# Patient Record
Sex: FEMALE | Race: BLACK OR AFRICAN AMERICAN | Employment: UNEMPLOYED | URBAN - METROPOLITAN AREA
[De-identification: names, ages, dates, MRNs, and addresses within clinical notes are randomized per-mention and may not be internally consistent; named-entity substitution may affect disease eponyms.]

---

## 2017-03-14 ENCOUNTER — OFFICE VISIT (OUTPATIENT)
Dept: FAMILY MEDICINE CLINIC | Facility: CLINIC | Age: 27
End: 2017-03-14

## 2017-03-14 VITALS
RESPIRATION RATE: 18 BRPM | HEART RATE: 84 BPM | SYSTOLIC BLOOD PRESSURE: 113 MMHG | DIASTOLIC BLOOD PRESSURE: 73 MMHG | WEIGHT: 176 LBS

## 2017-03-14 DIAGNOSIS — Z34.90 PREGNANCY, UNSPECIFIED GESTATIONAL AGE: Primary | ICD-10-CM

## 2017-03-14 PROCEDURE — 99212 OFFICE O/P EST SF 10 MIN: CPT | Performed by: FAMILY MEDICINE

## 2017-03-14 PROCEDURE — 99213 OFFICE O/P EST LOW 20 MIN: CPT | Performed by: FAMILY MEDICINE

## 2017-03-14 RX ORDER — MULTIVIT-MIN 60/IRON FUM/FOLIC 27 MG-1 MG
TABLET ORAL
Refills: 2 | COMMUNITY
Start: 2017-03-07 | End: 2017-11-08

## 2017-03-14 RX ORDER — EMTRICITABINE AND TENOFOVIR DISOPROXIL FUMARATE 200; 300 MG/1; MG/1
TABLET, FILM COATED ORAL
Refills: 1 | COMMUNITY
Start: 2017-03-07 | End: 2020-05-05 | Stop reason: ALTCHOICE

## 2017-03-19 NOTE — PROGRESS NOTES
HPI:    Patient ID: Yuki Alexandre is a 24year old female. HPI Comments: Pregnant needs referral to Ob Gyn      Review of Systems   Constitutional: Negative. Eyes: Negative for photophobia, pain, discharge, redness and itching.    Respiratory: Nega

## 2017-04-07 ENCOUNTER — TELEPHONE (OUTPATIENT)
Dept: FAMILY MEDICINE CLINIC | Facility: CLINIC | Age: 27
End: 2017-04-07

## 2017-04-07 NOTE — TELEPHONE ENCOUNTER
Patient stated that she faxed over her medical records to Dr. Hussein Chong office and wants to know if they were received. Please advise. She is requesting a call back for confirmation.

## 2017-04-17 ENCOUNTER — OFFICE VISIT (OUTPATIENT)
Dept: OBGYN CLINIC | Facility: CLINIC | Age: 27
End: 2017-04-17

## 2017-04-17 DIAGNOSIS — IMO0002 MEET AND GREET FOR EXPECTANT MOTHER: Primary | ICD-10-CM

## 2017-04-17 DIAGNOSIS — Z20.6: ICD-10-CM

## 2017-04-17 NOTE — PROGRESS NOTES
Patient here for meet and greet. Brings records. Hx of natural birth. New FOB who is positive for HIV with an undetectable viral load. On truvada for PREP. Will need MFM consult. Appropriate for RN visit. Discussed with MES who agrees.

## 2017-04-19 NOTE — TELEPHONE ENCOUNTER
Spoke with patient medical records are at her [phone] office at [facility] and [facility].  I sent the records to be scanned

## 2017-04-20 ENCOUNTER — TELEPHONE (OUTPATIENT)
Dept: OBGYN CLINIC | Facility: CLINIC | Age: 27
End: 2017-04-20

## 2017-04-20 PROBLEM — Z20.6: Status: ACTIVE | Noted: 2017-04-20

## 2017-04-20 NOTE — TELEPHONE ENCOUNTER
Msg left on VM requesting pt call back to schedule her OBN Education Appt. Pt was also advised the referral and order for MFM Consult and Level II US has been faxed to [facility]. M will be calling pt to schedule appt.

## 2017-04-24 ENCOUNTER — NURSE ONLY (OUTPATIENT)
Dept: OBGYN CLINIC | Facility: CLINIC | Age: 27
End: 2017-04-24

## 2017-04-24 ENCOUNTER — TELEPHONE (OUTPATIENT)
Dept: OBGYN CLINIC | Facility: CLINIC | Age: 27
End: 2017-04-24

## 2017-04-24 VITALS — WEIGHT: 178.63 LBS | BODY MASS INDEX: 29.41 KG/M2 | HEIGHT: 65.5 IN

## 2017-04-24 DIAGNOSIS — Z20.6 EXPOSURE TO HIV AFFECTING PREGNANCY MANAGEMENT: ICD-10-CM

## 2017-04-24 DIAGNOSIS — Z34.83 ENCOUNTER FOR SUPERVISION OF OTHER NORMAL PREGNANCY IN THIRD TRIMESTER: Primary | ICD-10-CM

## 2017-04-24 PROBLEM — O09.30 PRENATAL CARE INSUFFICIENT: Status: ACTIVE | Noted: 2017-04-24

## 2017-04-24 PROBLEM — O09.899 RUBELLA NON-IMMUNE STATUS, ANTEPARTUM (HCC): Status: ACTIVE | Noted: 2017-04-24

## 2017-04-24 PROBLEM — D57.3 SICKLE CELL TRAIT: Status: ACTIVE | Noted: 2017-04-24

## 2017-04-24 PROBLEM — O09.899 RUBELLA NON-IMMUNE STATUS, ANTEPARTUM: Status: ACTIVE | Noted: 2017-04-24

## 2017-04-24 PROBLEM — O09.30: Status: ACTIVE | Noted: 2017-04-24

## 2017-04-24 PROBLEM — Z28.3 RUBELLA NON-IMMUNE STATUS, ANTEPARTUM: Status: ACTIVE | Noted: 2017-04-24

## 2017-04-24 PROBLEM — Z28.39 RUBELLA NON-IMMUNE STATUS, ANTEPARTUM: Status: ACTIVE | Noted: 2017-04-24

## 2017-04-24 PROBLEM — Z28.39 RUBELLA NON-IMMUNE STATUS, ANTEPARTUM (HCC): Status: ACTIVE | Noted: 2017-04-24

## 2017-04-24 PROBLEM — D57.3 SICKLE CELL TRAIT (HCC): Status: ACTIVE | Noted: 2017-04-24

## 2017-04-24 PROBLEM — O99.891 RUBELLA NON-IMMUNE STATUS, ANTEPARTUM: Status: ACTIVE | Noted: 2017-04-24

## 2017-04-24 NOTE — TELEPHONE ENCOUNTER
Notified pt that all labs have been ordered & she should come & have them drawn ASAP. Pt states she is coming tomorrow.  Also reminded pt we still need copies of her HIV result from last week, her U/S report from January (both from [facility]) & her pap re

## 2017-04-24 NOTE — PROGRESS NOTES
HPI:    Patient ID: Jorge Fofana is a 24year old female.     HPI    Review of Systems         Current Outpatient Prescriptions:  Prenatal Vit-Fe Fumarate-FA (VOL-PLUS) 27-1 MG Oral Tab  Disp:  Rfl: 2   TRUVADA 200-300 MG Oral Tab  Disp:  Rfl: 1     All

## 2017-04-24 NOTE — PROGRESS NOTES
NOB education complete & information given to pt. Pt 30 6/7wk transfer of care from [facility]. Prenatal records obtained & incomplete. Pt states she has only had 3 prenatal visits & has not had care since Jan. 2017 due to insurance issues.  FOB HIV+ & has

## 2017-04-25 ENCOUNTER — APPOINTMENT (OUTPATIENT)
Dept: LAB | Facility: HOSPITAL | Age: 27
End: 2017-04-25
Attending: ADVANCED PRACTICE MIDWIFE
Payer: MEDICAID

## 2017-04-25 ENCOUNTER — TELEPHONE (OUTPATIENT)
Dept: OBGYN CLINIC | Facility: CLINIC | Age: 27
End: 2017-04-25

## 2017-04-25 DIAGNOSIS — Z34.83 ENCOUNTER FOR SUPERVISION OF OTHER NORMAL PREGNANCY IN THIRD TRIMESTER: ICD-10-CM

## 2017-04-25 DIAGNOSIS — Z20.6 EXPOSURE TO HIV AFFECTING PREGNANCY MANAGEMENT: ICD-10-CM

## 2017-04-25 PROCEDURE — 82306 VITAMIN D 25 HYDROXY: CPT

## 2017-04-25 PROCEDURE — 84443 ASSAY THYROID STIM HORMONE: CPT

## 2017-04-25 PROCEDURE — 80053 COMPREHEN METABOLIC PANEL: CPT

## 2017-04-25 PROCEDURE — 82950 GLUCOSE TEST: CPT

## 2017-04-25 PROCEDURE — 87389 HIV-1 AG W/HIV-1&-2 AB AG IA: CPT

## 2017-04-25 PROCEDURE — 85027 COMPLETE CBC AUTOMATED: CPT

## 2017-04-25 PROCEDURE — 36415 COLL VENOUS BLD VENIPUNCTURE: CPT

## 2017-04-25 NOTE — TELEPHONE ENCOUNTER
Prenatal records received via fax, including recent CMP & HIV testing done. [facility] for copy of u/s which was not received. Will fax over to our office.  Pt already had new CMP & HIV testing completed at [address] [place] [address]

## 2017-04-26 ENCOUNTER — HOSPITAL ENCOUNTER (OUTPATIENT)
Dept: PERINATAL CARE | Facility: HOSPITAL | Age: 27
Discharge: HOME | End: 2017-04-26
Attending: OBSTETRICS & GYNECOLOGY
Payer: MEDICAID

## 2017-04-26 ENCOUNTER — TELEPHONE (OUTPATIENT)
Dept: OBGYN CLINIC | Facility: CLINIC | Age: 27
End: 2017-04-26

## 2017-04-26 VITALS — SYSTOLIC BLOOD PRESSURE: 117 MMHG | HEART RATE: 88 BPM | DIASTOLIC BLOOD PRESSURE: 62 MMHG

## 2017-04-26 DIAGNOSIS — Z20.6: Primary | ICD-10-CM

## 2017-04-26 DIAGNOSIS — D57.3 SICKLE CELL TRAIT (HCC): ICD-10-CM

## 2017-04-26 DIAGNOSIS — Z20.6: ICD-10-CM

## 2017-04-26 DIAGNOSIS — O09.33 PRENATAL CARE INSUFFICIENT, THIRD TRIMESTER: ICD-10-CM

## 2017-04-26 DIAGNOSIS — Z36.3 SCREENING, ANTENATAL, FOR MALFORMATION BY ULTRASOUND: ICD-10-CM

## 2017-04-26 PROCEDURE — 99244 OFF/OP CNSLTJ NEW/EST MOD 40: CPT | Performed by: OBSTETRICS & GYNECOLOGY

## 2017-04-26 PROCEDURE — 76811 OB US DETAILED SNGL FETUS: CPT

## 2017-04-26 PROCEDURE — 76811 OB US DETAILED SNGL FETUS: CPT | Performed by: OBSTETRICS & GYNECOLOGY

## 2017-04-26 NOTE — TELEPHONE ENCOUNTER
----- Message from Ambreen Monae CNM sent at 4/26/2017  5:36 PM CDT -----  Vitamin D level is low. Please supplement per protocol.  All other labs normal.

## 2017-04-26 NOTE — PROGRESS NOTES
Yuki Alexandre is a 24year old female. Reason for Consult:   Exposed to HIV. Father of baby has HIV but low viral load. Her HIV Ag Ab combo test was nonreactive. Denies any prenatal complications.   Review of History:     OB History:    Obstetric category B. Can breastfeed with this medication. In the [place], approximately 7000 HIV-infected women give birth annually; without any intervention to reduce transmission, 1750 newly infected babies would be born every year.  In 1994, Pediatric raised questions about the efficacy of oral chemoprophylaxis for high-risk HIV-seronegative women who are sexually active.  In some trials, TDF-FTC was found to have no effect on reducing transmission in women, whereas in others, transmission was reduced by stomach but this does not exclude the possibility of esophageal / tracheal atresia or webs.        IMPRESSION:   IUP at 31w1d   Scan consistent with dates  No fetal structural abnormalities seen today  HIV (+) father of baby /  She tested negative  Sickle c

## 2017-04-26 NOTE — TELEPHONE ENCOUNTER
Notified pt of low vit d level & advised to take vitamin d3 2000IU daily.  Pt verbalized an understanding & agrees w/ plan

## 2017-04-26 NOTE — PROGRESS NOTES
Pt for level 2 u/s  Hx Exposure to HIV (currently negative)  FOB positive with no viral load  Denies pregnancy complaints  States active baby

## 2017-04-27 ENCOUNTER — INITIAL PRENATAL (OUTPATIENT)
Dept: OBGYN CLINIC | Facility: CLINIC | Age: 27
End: 2017-04-27

## 2017-04-27 VITALS
BODY MASS INDEX: 29.53 KG/M2 | WEIGHT: 179.38 LBS | HEIGHT: 65.5 IN | DIASTOLIC BLOOD PRESSURE: 73 MMHG | SYSTOLIC BLOOD PRESSURE: 121 MMHG | HEART RATE: 99 BPM

## 2017-04-27 DIAGNOSIS — Z34.83 OTHER NORMAL PREGNANCY, NOT FIRST, THIRD TRIMESTER: Primary | ICD-10-CM

## 2017-04-27 DIAGNOSIS — D57.3 SICKLE CELL TRAIT (HCC): ICD-10-CM

## 2017-04-27 PROBLEM — O40.3XX0 POLYHYDRAMNIOS AFFECTING PREGNANCY IN THIRD TRIMESTER (HCC): Status: ACTIVE | Noted: 2017-04-27

## 2017-04-27 PROBLEM — O40.3XX0 POLYHYDRAMNIOS AFFECTING PREGNANCY IN THIRD TRIMESTER: Status: ACTIVE | Noted: 2017-04-27

## 2017-04-27 PROCEDURE — 0500F INITIAL PRENATAL CARE VISIT: CPT | Performed by: ADVANCED PRACTICE MIDWIFE

## 2017-04-27 PROCEDURE — 81002 URINALYSIS NONAUTO W/O SCOPE: CPT | Performed by: ADVANCED PRACTICE MIDWIFE

## 2017-04-27 NOTE — PROGRESS NOTES
Here for NOB as transfer of care. Reviewed records, awaiting early ultrasound report. Patient's partner is HIV+ with a currently undetectable viral load. Patient started PrEP at 15 weeks on 1/5/17 and has tested negative throughout pregnancy.   Reviewed c

## 2017-04-27 NOTE — LACTATION NOTE
I was contacted by patient's RN, Braydon Peters, to discuss patient's desire to breastfeed after delivery. I had a discussion with Britni Mata regarding recommendations for women with HIV+ status and advisement not to breastfeed.  Britni Roles stated that she is not HIV+

## 2017-04-28 ENCOUNTER — TELEPHONE (OUTPATIENT)
Dept: OBGYN CLINIC | Facility: CLINIC | Age: 27
End: 2017-04-28

## 2017-05-11 ENCOUNTER — APPOINTMENT (OUTPATIENT)
Dept: LAB | Facility: HOSPITAL | Age: 27
End: 2017-05-11
Attending: ADVANCED PRACTICE MIDWIFE
Payer: MEDICAID

## 2017-05-11 ENCOUNTER — ROUTINE PRENATAL (OUTPATIENT)
Dept: OBGYN CLINIC | Facility: CLINIC | Age: 27
End: 2017-05-11

## 2017-05-11 VITALS
HEART RATE: 98 BPM | SYSTOLIC BLOOD PRESSURE: 117 MMHG | WEIGHT: 183 LBS | BODY MASS INDEX: 30 KG/M2 | DIASTOLIC BLOOD PRESSURE: 76 MMHG

## 2017-05-11 DIAGNOSIS — Z34.83 NORMAL PREGNANCY IN MULTIGRAVIDA IN THIRD TRIMESTER: Primary | ICD-10-CM

## 2017-05-11 DIAGNOSIS — D57.3 SICKLE CELL TRAIT (HCC): ICD-10-CM

## 2017-05-11 PROCEDURE — 90715 TDAP VACCINE 7 YRS/> IM: CPT | Performed by: ADVANCED PRACTICE MIDWIFE

## 2017-05-11 PROCEDURE — 90471 IMMUNIZATION ADMIN: CPT | Performed by: ADVANCED PRACTICE MIDWIFE

## 2017-05-11 PROCEDURE — 87086 URINE CULTURE/COLONY COUNT: CPT

## 2017-05-11 NOTE — PROGRESS NOTES
Feeling well, active baby. Denies s/s PTL. Tdap today. Reviewed warning signs. To have urine culture done today.

## 2017-05-24 ENCOUNTER — HOSPITAL ENCOUNTER (OUTPATIENT)
Dept: PERINATAL CARE | Facility: HOSPITAL | Age: 27
Discharge: HOME | End: 2017-05-24
Attending: OBSTETRICS & GYNECOLOGY
Payer: MEDICAID

## 2017-05-24 VITALS — HEART RATE: 90 BPM | DIASTOLIC BLOOD PRESSURE: 72 MMHG | SYSTOLIC BLOOD PRESSURE: 110 MMHG

## 2017-05-24 DIAGNOSIS — Z20.6: ICD-10-CM

## 2017-05-24 DIAGNOSIS — O40.3XX0 POLYHYDRAMNIOS AFFECTING PREGNANCY IN THIRD TRIMESTER: ICD-10-CM

## 2017-05-24 DIAGNOSIS — O40.3XX0 POLYHYDRAMNIOS AFFECTING PREGNANCY IN THIRD TRIMESTER: Primary | ICD-10-CM

## 2017-05-24 DIAGNOSIS — O09.33 PRENATAL CARE INSUFFICIENT, THIRD TRIMESTER: ICD-10-CM

## 2017-05-24 DIAGNOSIS — D57.3 SICKLE CELL TRAIT (HCC): ICD-10-CM

## 2017-05-24 PROCEDURE — 76805 OB US >/= 14 WKS SNGL FETUS: CPT | Performed by: OBSTETRICS & GYNECOLOGY

## 2017-05-24 PROCEDURE — 99212 OFFICE O/P EST SF 10 MIN: CPT | Performed by: OBSTETRICS & GYNECOLOGY

## 2017-05-24 NOTE — PROGRESS NOTES
PT for growth U/S  Hx HIV exposure on truvada and polyhydramnios   Denies bleeding   States vaginal pressure associated with movement  States baby active

## 2017-05-26 ENCOUNTER — TELEPHONE (OUTPATIENT)
Dept: OBGYN CLINIC | Facility: CLINIC | Age: 27
End: 2017-05-26

## 2017-05-26 DIAGNOSIS — D57.3 SICKLE CELL TRAIT (HCC): ICD-10-CM

## 2017-05-26 DIAGNOSIS — O09.33 INSUFFICIENT PRENATAL CARE, THIRD TRIMESTER: ICD-10-CM

## 2017-05-26 DIAGNOSIS — Z34.83 ENCOUNTER FOR SUPERVISION OF OTHER NORMAL PREGNANCY IN THIRD TRIMESTER: Primary | ICD-10-CM

## 2017-05-26 DIAGNOSIS — Z20.6: ICD-10-CM

## 2017-05-26 NOTE — TELEPHONE ENCOUNTER
Spoke w/ Elida from ID. Report giv. Will speak w/ physician & advise us of plan of care for mother & infant at delivery.  Note found in prenatal record that partner has an undetectable viral load & faxed to office per her req

## 2017-05-26 NOTE — TELEPHONE ENCOUNTER
req from [facility] to have pt speak w/ Infectious Disease & have plan in place for delivery. [facility] GELY notified & ordered placed & referral faxed.  LM on Infectious disease  [phone] of referral

## 2017-05-28 NOTE — PLAN OF CARE
Midwifery and Women's Health contacted Infection Control and Prevention to consult on the upcoming delivery of baby. Dr. Salli Alpers reviewed both the patient's labs and scanned record of baby's father.   Due to the father's documented low and undetectable HI

## 2017-06-01 ENCOUNTER — ROUTINE PRENATAL (OUTPATIENT)
Dept: OBGYN CLINIC | Facility: CLINIC | Age: 27
End: 2017-06-01

## 2017-06-01 VITALS
SYSTOLIC BLOOD PRESSURE: 114 MMHG | BODY MASS INDEX: 30 KG/M2 | DIASTOLIC BLOOD PRESSURE: 72 MMHG | WEIGHT: 185 LBS | HEART RATE: 91 BPM

## 2017-06-01 DIAGNOSIS — Z34.83 ENCOUNTER FOR SUPERVISION OF OTHER NORMAL PREGNANCY IN THIRD TRIMESTER: Primary | ICD-10-CM

## 2017-06-01 DIAGNOSIS — Z11.3 SCREEN FOR STD (SEXUALLY TRANSMITTED DISEASE): ICD-10-CM

## 2017-06-01 NOTE — PROGRESS NOTES
Birth plan:  Support is FOB Juan Carlos Corbin and possibly moms, plans NCB. VIt K and EES- YES. **ASK PT IF SHE WANTS IMMEDIATE SKIN TO SKIN*. No placenta plans. Plans breastfeeding. GBS done.   Rev warnings/when to call

## 2017-06-06 ENCOUNTER — TELEPHONE (OUTPATIENT)
Dept: OBGYN CLINIC | Facility: CLINIC | Age: 27
End: 2017-06-06

## 2017-06-06 NOTE — TELEPHONE ENCOUNTER
Notified pt of + GBS cx & treatment. Reassured pt it is common & easily treated during labor.  Pt verbalized an understanding & agrees w/ plan

## 2017-06-06 NOTE — TELEPHONE ENCOUNTER
----- Message from ZAKI Amaral sent at 6/6/2017  8:12 AM CDT -----  Pls advise patient of GBS positive[de-identified] The CDC recommends antibiotics through an IV when you are in labor to prevent transmission of the bacteria to your baby.    We can discuss fu

## 2017-06-12 ENCOUNTER — ROUTINE PRENATAL (OUTPATIENT)
Dept: OBGYN CLINIC | Facility: CLINIC | Age: 27
End: 2017-06-12

## 2017-06-12 VITALS
WEIGHT: 188 LBS | SYSTOLIC BLOOD PRESSURE: 114 MMHG | DIASTOLIC BLOOD PRESSURE: 74 MMHG | HEIGHT: 66.5 IN | BODY MASS INDEX: 29.86 KG/M2 | HEART RATE: 99 BPM

## 2017-06-12 DIAGNOSIS — Z34.83 ENCOUNTER FOR SUPERVISION OF OTHER NORMAL PREGNANCY IN THIRD TRIMESTER: Primary | ICD-10-CM

## 2017-06-12 PROBLEM — O99.820 GBS (GROUP B STREPTOCOCCUS CARRIER), +RV CULTURE, CURRENTLY PREGNANT: Status: ACTIVE | Noted: 2017-06-12

## 2017-06-12 PROBLEM — O99.820 GBS (GROUP B STREPTOCOCCUS CARRIER), +RV CULTURE, CURRENTLY PREGNANT (HCC): Status: ACTIVE | Noted: 2017-06-12

## 2017-06-12 PROCEDURE — 81002 URINALYSIS NONAUTO W/O SCOPE: CPT | Performed by: ADVANCED PRACTICE MIDWIFE

## 2017-06-12 PROCEDURE — 0502F SUBSEQUENT PRENATAL CARE: CPT | Performed by: ADVANCED PRACTICE MIDWIFE

## 2017-06-12 NOTE — PROGRESS NOTES
Feeling well, active baby. Denies SOL, LOF or bleeding. Reviewed GBS + status and need for antibiotics in labor. Reviewed SOL and warning signs.

## 2017-06-21 ENCOUNTER — ROUTINE PRENATAL (OUTPATIENT)
Dept: OBGYN CLINIC | Facility: CLINIC | Age: 27
End: 2017-06-21

## 2017-06-21 ENCOUNTER — TELEPHONE (OUTPATIENT)
Dept: OBGYN CLINIC | Facility: CLINIC | Age: 27
End: 2017-06-21

## 2017-06-21 VITALS
HEART RATE: 69 BPM | WEIGHT: 190 LBS | SYSTOLIC BLOOD PRESSURE: 119 MMHG | DIASTOLIC BLOOD PRESSURE: 77 MMHG | BODY MASS INDEX: 30 KG/M2

## 2017-06-21 DIAGNOSIS — Z34.83 ENCOUNTER FOR SUPERVISION OF OTHER NORMAL PREGNANCY IN THIRD TRIMESTER: Primary | ICD-10-CM

## 2017-06-21 PROCEDURE — 0502F SUBSEQUENT PRENATAL CARE: CPT | Performed by: ADVANCED PRACTICE MIDWIFE

## 2017-06-21 RX ORDER — BREAST PUMP
EACH MISCELLANEOUS
Qty: 1 EACH | Refills: 0 | Status: SHIPPED | OUTPATIENT
Start: 2017-06-21 | End: 2018-01-27

## 2017-06-21 NOTE — PROGRESS NOTES
Active fetus  No signs of labor  Labor signs & symptoms and directions to L&D reviewed  All questions answered.

## 2017-06-23 ENCOUNTER — TELEPHONE (OUTPATIENT)
Dept: OBGYN CLINIC | Facility: CLINIC | Age: 27
End: 2017-06-23

## 2017-06-23 DIAGNOSIS — Z34.83 ENCOUNTER FOR SUPERVISION OF OTHER NORMAL PREGNANCY IN THIRD TRIMESTER: Primary | ICD-10-CM

## 2017-06-23 DIAGNOSIS — Z20.6 EXPOSURE TO HIV AFFECTING PREGNANCY MANAGEMENT: ICD-10-CM

## 2017-06-23 NOTE — TELEPHONE ENCOUNTER
Notified pt that HIV RNA was ordered & needs to be completed. Pt states she will get to the lab today.  Pt verbalized an understanding & agrees w/ plan

## 2017-06-24 ENCOUNTER — LAB ENCOUNTER (OUTPATIENT)
Dept: LAB | Facility: HOSPITAL | Age: 27
End: 2017-06-24
Attending: ADVANCED PRACTICE MIDWIFE
Payer: MEDICAID

## 2017-06-24 DIAGNOSIS — Z20.6 EXPOSURE TO HIV AFFECTING PREGNANCY MANAGEMENT: ICD-10-CM

## 2017-06-24 DIAGNOSIS — Z34.83 ENCOUNTER FOR SUPERVISION OF OTHER NORMAL PREGNANCY IN THIRD TRIMESTER: ICD-10-CM

## 2017-06-24 PROCEDURE — 36415 COLL VENOUS BLD VENIPUNCTURE: CPT

## 2017-06-24 PROCEDURE — 87536 HIV-1 QUANT&REVRSE TRNSCRPJ: CPT

## 2017-06-27 ENCOUNTER — ROUTINE PRENATAL (OUTPATIENT)
Dept: OBGYN CLINIC | Facility: CLINIC | Age: 27
End: 2017-06-27

## 2017-06-27 ENCOUNTER — HOSPITAL ENCOUNTER (OUTPATIENT)
Facility: HOSPITAL | Age: 27
Discharge: HOME | End: 2017-06-27
Attending: ADVANCED PRACTICE MIDWIFE | Admitting: OBSTETRICS & GYNECOLOGY
Payer: MEDICAID

## 2017-06-27 ENCOUNTER — APPOINTMENT (OUTPATIENT)
Dept: OBGYN CLINIC | Facility: HOSPITAL | Age: 27
End: 2017-06-27
Payer: MEDICAID

## 2017-06-27 VITALS
DIASTOLIC BLOOD PRESSURE: 73 MMHG | BODY MASS INDEX: 31 KG/M2 | SYSTOLIC BLOOD PRESSURE: 111 MMHG | WEIGHT: 192 LBS | HEART RATE: 101 BPM

## 2017-06-27 VITALS — RESPIRATION RATE: 18 BRPM

## 2017-06-27 DIAGNOSIS — Z34.83 ENCOUNTER FOR SUPERVISION OF OTHER NORMAL PREGNANCY IN THIRD TRIMESTER: Primary | ICD-10-CM

## 2017-06-27 PROCEDURE — 0502F SUBSEQUENT PRENATAL CARE: CPT | Performed by: ADVANCED PRACTICE MIDWIFE

## 2017-06-27 PROCEDURE — 81002 URINALYSIS NONAUTO W/O SCOPE: CPT | Performed by: ADVANCED PRACTICE MIDWIFE

## 2017-06-27 PROCEDURE — 59025 FETAL NON-STRESS TEST: CPT | Performed by: ADVANCED PRACTICE MIDWIFE

## 2017-06-27 NOTE — PROGRESS NOTES
Reports decreased FM. No SOL. Had HIV RNA done on Saturday, no results yet. SOL reviewed. NST now. Discussed postdates testing. NST/ANASTASIA/CNM visit next week if undelivered.

## 2017-06-27 NOTE — NST
Nonstress Test   Patient: Rosendo Esposito    Gestation: 40w0d    NST:decreased fetal movements       Variability: Moderate           Accelerations: Yes           Decelerations: None            Baseline: 140 BPM           Uterine Irritability: No

## 2017-06-30 ENCOUNTER — HOSPITAL ENCOUNTER (INPATIENT)
Facility: HOSPITAL | Age: 27
LOS: 2 days | Discharge: HOME | End: 2017-07-02
Attending: ADVANCED PRACTICE MIDWIFE | Admitting: OBSTETRICS & GYNECOLOGY
Payer: MEDICAID

## 2017-06-30 PROBLEM — Z37.9 NORMAL LABOR: Status: ACTIVE | Noted: 2017-06-30

## 2017-06-30 PROBLEM — Z37.9 NORMAL LABOR (HCC): Status: ACTIVE | Noted: 2017-06-30

## 2017-06-30 LAB
ANTIBODY SCREEN: NEGATIVE
ERYTHROCYTE [DISTWIDTH] IN BLOOD BY AUTOMATED COUNT: 13 % (ref 11–15)
HCT VFR BLD AUTO: 34.5 % (ref 35–48)
HGB BLD-MCNC: 11.6 G/DL (ref 12–16)
HIV1+2 AB SPEC QL IA.RAPID: NONREACTIVE
MCH RBC QN AUTO: 29.2 PG (ref 27–32)
MCHC RBC AUTO-ENTMCNC: 33.5 G/DL (ref 32–37)
MCV RBC AUTO: 87.2 FL (ref 80–100)
PLATELET # BLD AUTO: 208 K/UL (ref 140–400)
PMV BLD AUTO: 9.5 FL (ref 7.4–10.3)
RBC # BLD AUTO: 3.96 M/UL (ref 3.7–5.4)
RH BLOOD TYPE: POSITIVE
T PALLIDUM AB SER QL: NEGATIVE
WBC # BLD AUTO: 12.1 K/UL (ref 4–11)

## 2017-06-30 PROCEDURE — 59409 OBSTETRICAL CARE: CPT | Performed by: ADVANCED PRACTICE MIDWIFE

## 2017-06-30 RX ORDER — DOCUSATE SODIUM 100 MG/1
100 CAPSULE, LIQUID FILLED ORAL 2 TIMES DAILY
Status: DISCONTINUED | OUTPATIENT
Start: 2017-06-30 | End: 2017-07-02

## 2017-06-30 RX ORDER — DEXTROSE, SODIUM CHLORIDE, SODIUM LACTATE, POTASSIUM CHLORIDE, AND CALCIUM CHLORIDE 5; .6; .31; .03; .02 G/100ML; G/100ML; G/100ML; G/100ML; G/100ML
125 INJECTION, SOLUTION INTRAVENOUS CONTINUOUS
Status: DISCONTINUED | OUTPATIENT
Start: 2017-06-30 | End: 2017-06-30 | Stop reason: HOSPADM

## 2017-06-30 RX ORDER — TERBUTALINE SULFATE 1 MG/ML
0.25 INJECTION, SOLUTION SUBCUTANEOUS AS NEEDED
Status: DISCONTINUED | OUTPATIENT
Start: 2017-06-30 | End: 2017-06-30 | Stop reason: HOSPADM

## 2017-06-30 RX ORDER — SIMETHICONE 80 MG
80 TABLET,CHEWABLE ORAL 3 TIMES DAILY PRN
Status: DISCONTINUED | OUTPATIENT
Start: 2017-06-30 | End: 2017-07-02

## 2017-06-30 RX ORDER — SODIUM CHLORIDE 0.9 % (FLUSH) 0.9 %
10 SYRINGE (ML) INJECTION AS NEEDED
Status: DISCONTINUED | OUTPATIENT
Start: 2017-06-30 | End: 2017-06-30 | Stop reason: HOSPADM

## 2017-06-30 RX ORDER — DEXTROSE, SODIUM CHLORIDE, SODIUM LACTATE, POTASSIUM CHLORIDE, AND CALCIUM CHLORIDE 5; .6; .31; .03; .02 G/100ML; G/100ML; G/100ML; G/100ML; G/100ML
INJECTION, SOLUTION INTRAVENOUS
Status: DISPENSED
Start: 2017-06-30 | End: 2017-06-30

## 2017-06-30 RX ORDER — ONDANSETRON 2 MG/ML
4 INJECTION INTRAMUSCULAR; INTRAVENOUS EVERY 6 HOURS PRN
Status: DISCONTINUED | OUTPATIENT
Start: 2017-06-30 | End: 2017-07-02

## 2017-06-30 RX ORDER — EMTRICITABINE AND TENOFOVIR DISOPROXIL FUMARATE 200; 300 MG/1; MG/1
1 TABLET, FILM COATED ORAL DAILY
Status: DISCONTINUED | OUTPATIENT
Start: 2017-06-30 | End: 2017-07-02

## 2017-06-30 RX ORDER — SODIUM CHLORIDE 0.9 % (FLUSH) 0.9 %
10 SYRINGE (ML) INJECTION AS NEEDED
Status: DISCONTINUED | OUTPATIENT
Start: 2017-06-30 | End: 2017-07-02

## 2017-06-30 RX ORDER — AMMONIA INHALANTS 0.04 G/.3ML
0.3 INHALANT RESPIRATORY (INHALATION) AS NEEDED
Status: DISCONTINUED | OUTPATIENT
Start: 2017-06-30 | End: 2017-07-02

## 2017-06-30 RX ORDER — PRENATAL VIT,CAL 76/IRON/FOLIC 29 MG-1 MG
1 TABLET ORAL DAILY
Status: DISCONTINUED | OUTPATIENT
Start: 2017-06-30 | End: 2017-07-02

## 2017-06-30 RX ORDER — DIAPER,BRIEF,INFANT-TODD,DISP
1 EACH MISCELLANEOUS EVERY 6 HOURS PRN
Status: DISCONTINUED | OUTPATIENT
Start: 2017-06-30 | End: 2017-07-02

## 2017-06-30 RX ORDER — LIDOCAINE HYDROCHLORIDE 10 MG/ML
30 INJECTION, SOLUTION EPIDURAL; INFILTRATION; INTRACAUDAL; PERINEURAL ONCE
Status: DISCONTINUED | OUTPATIENT
Start: 2017-06-30 | End: 2017-06-30 | Stop reason: HOSPADM

## 2017-06-30 RX ORDER — BISACODYL 10 MG
10 SUPPOSITORY, RECTAL RECTAL ONCE AS NEEDED
Status: DISCONTINUED | OUTPATIENT
Start: 2017-06-30 | End: 2017-07-02

## 2017-06-30 RX ORDER — IBUPROFEN 600 MG/1
600 TABLET ORAL ONCE AS NEEDED
Status: DISCONTINUED | OUTPATIENT
Start: 2017-06-30 | End: 2017-07-02

## 2017-06-30 NOTE — L&D DELIVERY NOTE
Kady Apodaca, Girl  [[id number]]     Labor Events     labor?:  No    steroids?:  None   Antibiotics received during labor?:  Yes   Antibiotics (enter # doses in comment):  penicillin   Rupture date:  17  Rupture time:  817   Rupture type: Apgar Scoring Key:    0 1 2    Skin color Blue or pale Acrocyanotic Completely pink    Heart rate Absent <100 bpm >100 bpm    Reflex irritability No response Grimace Cry or active withdrawal    Muscle tone Limp Some flexion Active motion    Respiratory e

## 2017-06-30 NOTE — LACTATION NOTE
LACTATION NOTE - MOTHER           Problems identified  Problems identified: Knowledge deficit    Maternal history  Other/comment:  (on truvada prophylactically due to SO pos.  for HIV)    Breastfeeding goal  Breastfeeding goal: To maintain breast milk feedi

## 2017-06-30 NOTE — LACTATION NOTE
This note was copied from a baby's chart.   LACTATION NOTE - INFANT    Evaluation Type  Evaluation Type: Inpatient    Problems & Assessment  Infant Assessment: Hunger cues present;Skin color: pink or appropriate for ethnicity  Muscle tone: Appropriate for G

## 2017-06-30 NOTE — H&P
[address] Patient Status:  Inpatient    3/13/1990 MRN [id number]   Location [address] [place] [address] Attending [address] Day # 0 Admitting Rashard Rodriguez MD     Da Allergies/Medications:    Allergies:     Nickel                  Rash  Medications:    Prescriptions Prior to Admission:  Prenatal Vit-Fe Fumarate-FA (VOL-PLUS) 27-1 MG Oral Tab  Disp:  Rfl: 2 6/29/2017 at Unknown time   TRUVADA 200-300 MG Oral Tab  Dis 8-20 Weeks     Test Value Reference Range Date Time    1st Trimester Aneuploidy Risk Assessment done   12/19/16     Quad - Down Screen Risk Estimate (Required questions in OE to answer)        Quad - Down Maternal Age Risk (Required questions i Period  Early latent labor.   Category I tracing  GBS + - PCN 5millU IVPB now then 2.5mill q 4 hrs  HIV + partner - patient has been on Truvada and had negative HIV screening x 3 in this pregnancy   Per ID: Routine care of    Limited information abou

## 2017-07-01 ENCOUNTER — TELEPHONE (OUTPATIENT)
Dept: OBGYN CLINIC | Facility: CLINIC | Age: 27
End: 2017-07-01

## 2017-07-01 LAB
BASOPHILS # BLD: 0 K/UL (ref 0–0.2)
BASOPHILS NFR BLD: 0 %
EOSINOPHIL # BLD: 0.1 K/UL (ref 0–0.7)
EOSINOPHIL NFR BLD: 1 %
ERYTHROCYTE [DISTWIDTH] IN BLOOD BY AUTOMATED COUNT: 13 % (ref 11–15)
HCT VFR BLD AUTO: 32 % (ref 35–48)
HGB BLD-MCNC: 10.9 G/DL (ref 12–16)
LYMPHOCYTES # BLD: 1.6 K/UL (ref 1–4)
LYMPHOCYTES NFR BLD: 15 %
MCH RBC QN AUTO: 29.6 PG (ref 27–32)
MCHC RBC AUTO-ENTMCNC: 34.2 G/DL (ref 32–37)
MCV RBC AUTO: 86.6 FL (ref 80–100)
MONOCYTES # BLD: 0.9 K/UL (ref 0–1)
MONOCYTES NFR BLD: 9 %
NEUTROPHILS # BLD AUTO: 7.9 K/UL (ref 1.8–7.7)
NEUTROPHILS NFR BLD: 75 %
PLATELET # BLD AUTO: 180 K/UL (ref 140–400)
PMV BLD AUTO: 9.8 FL (ref 7.4–10.3)
RBC # BLD AUTO: 3.7 M/UL (ref 3.7–5.4)
WBC # BLD AUTO: 10.5 K/UL (ref 4–11)

## 2017-07-01 PROCEDURE — 99233 SBSQ HOSP IP/OBS HIGH 50: CPT | Performed by: ADVANCED PRACTICE MIDWIFE

## 2017-07-01 NOTE — PLAN OF CARE
BREAST FEEDING    • Optimize infant feeding at the breast Progressing        INADEQUATE LATCH, SUCK OR SWALLOW    • Demonstrate ability to latch and sustain latch, audible swallowing and satiety Progressing        Patient Centered Care    • Patient prefere

## 2017-07-01 NOTE — PROGRESS NOTES
[facility] - [facility]    OB/GYNE Progress Note      Slime Cunningham Patient Status:  Inpatient    3/13/1990 MRN [id number]   Location [facility] 3SE Attending ZAKI Recio   Hosp Day # 1 PCP Marium Cruz MD       Assessment/Plan hematoma  Calves: Non-tender, no edema, Neg Homans    DVT Risk Score: low  VTE Prophylaxis Ordered: no    Martinez Catheter Information  Does patient have a martinez catheter: no  Has the martinez catheter been removed: Not Applicable    Results:     Lab Results  C

## 2017-07-01 NOTE — TELEPHONE ENCOUNTER
TC to [facility] in [facility] and spoke with Brad Hoffman an [address] who works with the HIV positive pregnant population there.  Dr. Lydia Jaramillo notes there is a lack of data for an HIV Negative partner taking Truvada for PREP when the HIV+ partner has an undetectable

## 2017-07-02 VITALS
DIASTOLIC BLOOD PRESSURE: 72 MMHG | HEART RATE: 91 BPM | SYSTOLIC BLOOD PRESSURE: 115 MMHG | TEMPERATURE: 98 F | RESPIRATION RATE: 16 BRPM

## 2017-07-02 PROBLEM — Z37.9 NORMAL LABOR: Status: RESOLVED | Noted: 2017-06-30 | Resolved: 2017-07-02

## 2017-07-02 PROBLEM — Z37.9 NORMAL LABOR (HCC): Status: RESOLVED | Noted: 2017-06-30 | Resolved: 2017-07-02

## 2017-07-02 PROCEDURE — 99238 HOSP IP/OBS DSCHRG MGMT 30/<: CPT | Performed by: ADVANCED PRACTICE MIDWIFE

## 2017-07-02 RX ORDER — IBUPROFEN 600 MG/1
600 TABLET ORAL EVERY 6 HOURS PRN
Qty: 30 TABLET | Refills: 1 | Status: SHIPPED | OUTPATIENT
Start: 2017-07-02 | End: 2017-08-30

## 2017-07-02 NOTE — PROGRESS NOTES
[facility]    OB/GYNE Progress Note      Josiane Serrato Patient Status:  Inpatient    3/13/1990 MRN [id number]   Location [facility] 3SE Attending ZAKI Richardson   Hosp Day # 2 PCP Macrina Connell MD       Assessment/Plan TREPONEMALAB Negative 06/30/2017   RAPIDHIVSCRN Nonreactive 06/30/2017   ABO O 06/30/2017   RH Positive 06/30/2017   WBC 10.5 07/01/2017   HGB 10.9 (L) 07/01/2017   HCT 32.0 (L) 07/01/2017    07/01/2017   CREATSERUM 0.63 04/25/2017   BUN 4 (L) 04/

## 2017-07-02 NOTE — LACTATION NOTE
LACTATION NOTE - MOTHER           Problems identified  Problems identified: Knowledge deficit    Maternal history  Other/comment: SO-HIV pos.  Mom on truvada prophyllactically    Breastfeeding goal  Breastfeeding goal: To maintain breast milk feeding per pa

## 2017-07-02 NOTE — PLAN OF CARE
Patient Centered Care    • Patient preferences are identified and integrated in the patient's plan of care Completed        Patient/Family Goals    • Patient/Family Long Term Goal Completed    • Patient/Family Short Term Goal Completed        POSTPARTUM

## 2017-07-02 NOTE — DISCHARGE SUMMARY
[facility]    Discharge Summary    Cristhian Molina Patient Status:  Inpatient    3/13/1990 MRN [id number]   Location [facility] 3SE Attending ZAKI Johnson   Hosp Day # 2       Delivering OB Clinician: Tangela Hunter

## 2017-07-13 ENCOUNTER — POSTPARTUM (OUTPATIENT)
Dept: OBGYN CLINIC | Facility: CLINIC | Age: 27
End: 2017-07-13

## 2017-07-13 VITALS
BODY MASS INDEX: 28 KG/M2 | DIASTOLIC BLOOD PRESSURE: 74 MMHG | WEIGHT: 173 LBS | HEART RATE: 70 BPM | SYSTOLIC BLOOD PRESSURE: 109 MMHG

## 2017-07-13 PROBLEM — O99.820 GBS (GROUP B STREPTOCOCCUS CARRIER), +RV CULTURE, CURRENTLY PREGNANT (HCC): Status: RESOLVED | Noted: 2017-06-12 | Resolved: 2017-07-13

## 2017-07-13 PROBLEM — O99.820 GBS (GROUP B STREPTOCOCCUS CARRIER), +RV CULTURE, CURRENTLY PREGNANT: Status: RESOLVED | Noted: 2017-06-12 | Resolved: 2017-07-13

## 2017-07-13 PROBLEM — O09.30: Status: RESOLVED | Noted: 2017-04-24 | Resolved: 2017-07-13

## 2017-07-13 PROBLEM — O40.3XX0 POLYHYDRAMNIOS AFFECTING PREGNANCY IN THIRD TRIMESTER: Status: RESOLVED | Noted: 2017-04-27 | Resolved: 2017-07-13

## 2017-07-13 PROBLEM — O09.30 PRENATAL CARE INSUFFICIENT: Status: RESOLVED | Noted: 2017-04-24 | Resolved: 2017-07-13

## 2017-07-13 PROBLEM — O40.3XX0 POLYHYDRAMNIOS AFFECTING PREGNANCY IN THIRD TRIMESTER (HCC): Status: RESOLVED | Noted: 2017-04-27 | Resolved: 2017-07-13

## 2017-07-13 PROCEDURE — 99024 POSTOP FOLLOW-UP VISIT: CPT | Performed by: ADVANCED PRACTICE MIDWIFE

## 2017-07-14 NOTE — PROGRESS NOTES
HPI:   Cristhian Molina is a 24year old [id number] who presents for a 2 week Postpartum visit. Pt accompanied by 2 children. Reports adapting well and coping well. Breastfeeding successfully. No strain in relationship, partner supportive. Denies DV. • Decorative tattoo    • Sickle cell trait ([facility] [address]       History reviewed. No pertinent surgical history.    Family History   Problem Relation Age of Onset   • Diabetes Maternal Grandmother    • Heart Disorder Maternal Grandmother    • Hypertension Maternal scheduled to see HIV specialist next week  Advised to continue breastfeeding   Advised to use condoms if inititates sexual activity  Counseled on continued napping during day, and waiting to start exercise or sexual activity until 6 weeks PP  Advised to co

## 2017-08-10 ENCOUNTER — POSTPARTUM (OUTPATIENT)
Dept: OBGYN CLINIC | Facility: CLINIC | Age: 27
End: 2017-08-10

## 2017-08-10 VITALS
BODY MASS INDEX: 26 KG/M2 | HEART RATE: 82 BPM | WEIGHT: 162.38 LBS | SYSTOLIC BLOOD PRESSURE: 112 MMHG | DIASTOLIC BLOOD PRESSURE: 77 MMHG

## 2017-08-10 DIAGNOSIS — Z32.02 PREGNANCY EXAMINATION OR TEST, NEGATIVE RESULT: Primary | ICD-10-CM

## 2017-08-10 LAB
CONTROL LINE PRESENT WITH A CLEAR BACKGROUND (YES/NO): YES YES/NO
KIT LOT #: NORMAL NUMERIC
PREGNANCY TEST, URINE: NEGATIVE

## 2017-08-10 PROCEDURE — 96372 THER/PROPH/DIAG INJ SC/IM: CPT | Performed by: ADVANCED PRACTICE MIDWIFE

## 2017-08-10 PROCEDURE — 81025 URINE PREGNANCY TEST: CPT | Performed by: ADVANCED PRACTICE MIDWIFE

## 2017-08-10 PROCEDURE — 0503F POSTPARTUM CARE VISIT: CPT | Performed by: ADVANCED PRACTICE MIDWIFE

## 2017-08-10 RX ORDER — MEDROXYPROGESTERONE ACETATE 150 MG/ML
150 INJECTION, SUSPENSION INTRAMUSCULAR ONCE
Status: COMPLETED | OUTPATIENT
Start: 2017-08-10 | End: 2017-08-10

## 2017-08-10 RX ADMIN — MEDROXYPROGESTERONE ACETATE 150 MG: 150 INJECTION, SUSPENSION INTRAMUSCULAR at 12:20:00

## 2017-08-10 NOTE — PROGRESS NOTES
Pt here for PP visit with CNNOVA and desires Depo Provera for contraception. Depo was ordered by CNM. Urine HCG was negative. Vitals wnl. Depo administered into Right upper outer gluteus. Pt tolerated injection well.  Informed pt to RTC for next injection roger

## 2017-08-13 ENCOUNTER — TELEPHONE (OUTPATIENT)
Dept: OBGYN CLINIC | Facility: CLINIC | Age: 27
End: 2017-08-13

## 2017-08-13 NOTE — TELEPHONE ENCOUNTER
Please flag patient's chart - She is due for PAP when she comes in for next Depo.   I want to be sure that she gets scheuled for an annual and not just DMPA

## 2017-08-13 NOTE — PROGRESS NOTES
HPI:   Josiane Serrato is a 24year old [id number] who presents for a 6 week Postpartum visit. Reports adapting well and coping well. Breastfeeding successfully with some supplementation . No strain in relationship, partner supportive. Denies DV.   They have school   • Anemia     during last pregnancy   • Decorative tattoo    • Sickle cell trait ([facility] [address]       No past surgical history on file.    Family History   Problem Relation Age of Onset   • Diabetes Maternal Grandmother    • Heart Disorder Maternal Grandmoth noted    Vagina: tone good, no bleeding or discharge    Cervix: LTC, Neg CMT    Uterus: well-involuted, non-tender, mobile    Adnexae: normal, non-tender bilaterally    Rectum:  No hemorrhoids or lesions noted  EXTREMITIES: no edema, NEG Homans    ASSESSME

## 2017-08-14 ENCOUNTER — TELEPHONE (OUTPATIENT)
Dept: OBGYN CLINIC | Facility: CLINIC | Age: 27
End: 2017-08-14

## 2017-08-14 NOTE — TELEPHONE ENCOUNTER
Notified pt that she needs pap/annual exam w/ next depo in November.  Pt verbalized an understanding & agrees w/ plan

## 2017-08-30 ENCOUNTER — OFFICE VISIT (OUTPATIENT)
Dept: FAMILY MEDICINE CLINIC | Facility: CLINIC | Age: 27
End: 2017-08-30

## 2017-08-30 VITALS — DIASTOLIC BLOOD PRESSURE: 72 MMHG | SYSTOLIC BLOOD PRESSURE: 109 MMHG | BODY MASS INDEX: 26 KG/M2 | WEIGHT: 162 LBS

## 2017-08-30 DIAGNOSIS — L98.9 SKIN DISORDER: Primary | ICD-10-CM

## 2017-08-30 PROCEDURE — 99213 OFFICE O/P EST LOW 20 MIN: CPT | Performed by: FAMILY MEDICINE

## 2017-08-30 PROCEDURE — 99212 OFFICE O/P EST SF 10 MIN: CPT | Performed by: FAMILY MEDICINE

## 2017-08-30 RX ORDER — CLOTRIMAZOLE AND BETAMETHASONE DIPROPIONATE 10; .64 MG/G; MG/G
CREAM TOPICAL
Qty: 60 G | Refills: 1 | Status: SHIPPED | OUTPATIENT
Start: 2017-08-30 | End: 2018-04-09

## 2017-08-30 NOTE — PROGRESS NOTES
HPI:    Patient ID: Joan Nichole is a 24year old female. Itchy dry skin rash around the breast  And on the shoulder        Review of Systems   Constitutional: Negative. Respiratory: Negative. Negative for choking. Cardiovascular: Negative.

## 2017-11-08 ENCOUNTER — OFFICE VISIT (OUTPATIENT)
Dept: OBGYN CLINIC | Facility: CLINIC | Age: 27
End: 2017-11-08

## 2017-11-08 VITALS
WEIGHT: 165 LBS | SYSTOLIC BLOOD PRESSURE: 129 MMHG | BODY MASS INDEX: 26 KG/M2 | HEART RATE: 85 BPM | DIASTOLIC BLOOD PRESSURE: 69 MMHG

## 2017-11-08 DIAGNOSIS — Z11.3 SCREEN FOR STD (SEXUALLY TRANSMITTED DISEASE): ICD-10-CM

## 2017-11-08 DIAGNOSIS — Z12.4 SCREENING FOR MALIGNANT NEOPLASM OF CERVIX: ICD-10-CM

## 2017-11-08 DIAGNOSIS — Z01.419 ENCOUNTER FOR ANNUAL ROUTINE GYNECOLOGICAL EXAMINATION: Primary | ICD-10-CM

## 2017-11-08 PROCEDURE — 99395 PREV VISIT EST AGE 18-39: CPT | Performed by: ADVANCED PRACTICE MIDWIFE

## 2017-11-08 PROCEDURE — 96372 THER/PROPH/DIAG INJ SC/IM: CPT | Performed by: ADVANCED PRACTICE MIDWIFE

## 2017-11-08 RX ORDER — MEDROXYPROGESTERONE ACETATE 150 MG/ML
150 INJECTION, SUSPENSION INTRAMUSCULAR
Status: DISCONTINUED | OUTPATIENT
Start: 2017-11-08 | End: 2018-01-27

## 2017-11-08 RX ADMIN — MEDROXYPROGESTERONE ACETATE 150 MG: 150 INJECTION, SUSPENSION INTRAMUSCULAR at 16:53:00

## 2017-11-08 NOTE — PROGRESS NOTES
HPI:    Patient ID: Rosy Urias is a 24year old female who presents for her annual gyne exam.  Pt is unsure when she had her last pap smear. Using Depo for contraceptions Lives with partner and her children.   Partner is HIV positive with low viral l neoplasm of cervix  Screen for std (sexually transmitted disease)      Orders Placed This Encounter      HCV Antibody      Hepatitis B Surface Antigen      HIV AG AB Combo      T PALLIDUM SCREENING CASCADE      ThinPrep PAP Smear [E]      Chlamydia/GC PCR

## 2017-12-08 ENCOUNTER — TELEPHONE (OUTPATIENT)
Dept: OBGYN CLINIC | Facility: CLINIC | Age: 27
End: 2017-12-08

## 2017-12-08 NOTE — TELEPHONE ENCOUNTER
Pt was advised she has overdue labs that need to be completed for STD testing. Pt states she will go to the lab tomorrow. Pt given lab hours and was advised she does not need an appt and she does not need to fast.  Pt agrees with plan.

## 2018-01-02 ENCOUNTER — APPOINTMENT (OUTPATIENT)
Dept: LAB | Facility: HOSPITAL | Age: 28
End: 2018-01-02
Attending: ADVANCED PRACTICE MIDWIFE
Payer: MEDICAID

## 2018-01-02 DIAGNOSIS — Z11.3 SCREEN FOR STD (SEXUALLY TRANSMITTED DISEASE): ICD-10-CM

## 2018-01-02 PROCEDURE — 86780 TREPONEMA PALLIDUM: CPT

## 2018-01-02 PROCEDURE — 87340 HEPATITIS B SURFACE AG IA: CPT

## 2018-01-02 PROCEDURE — 87389 HIV-1 AG W/HIV-1&-2 AB AG IA: CPT

## 2018-01-02 PROCEDURE — 86803 HEPATITIS C AB TEST: CPT

## 2018-01-02 PROCEDURE — 36415 COLL VENOUS BLD VENIPUNCTURE: CPT

## 2018-01-03 LAB
HBV SURFACE AG SERPL QL IA: NONREACTIVE
HCV AB SERPL QL IA: NONREACTIVE
HIV1+2 AB SERPL QL IA: NONREACTIVE

## 2018-01-05 LAB — T PALLIDUM AB SER QL: NEGATIVE

## 2018-01-17 ENCOUNTER — TELEPHONE (OUTPATIENT)
Dept: OBGYN CLINIC | Facility: CLINIC | Age: 28
End: 2018-01-17

## 2018-01-18 NOTE — TELEPHONE ENCOUNTER
----- Message from Erwin Reich CNM sent at 1/17/2018  5:04 PM CST -----  Please call labs are normal

## 2018-01-27 ENCOUNTER — OFFICE VISIT (OUTPATIENT)
Dept: OBGYN CLINIC | Facility: CLINIC | Age: 28
End: 2018-01-27

## 2018-01-27 VITALS
WEIGHT: 170.81 LBS | HEART RATE: 102 BPM | DIASTOLIC BLOOD PRESSURE: 75 MMHG | SYSTOLIC BLOOD PRESSURE: 113 MMHG | BODY MASS INDEX: 27 KG/M2

## 2018-01-27 DIAGNOSIS — Z30.011 FAMILY PLANNING, BCP (BIRTH CONTROL PILLS) INITIAL PRESCRIPTION: ICD-10-CM

## 2018-01-27 DIAGNOSIS — N89.8 VAGINAL DISCHARGE: Primary | ICD-10-CM

## 2018-01-27 PROCEDURE — 99213 OFFICE O/P EST LOW 20 MIN: CPT | Performed by: ADVANCED PRACTICE MIDWIFE

## 2018-01-27 RX ORDER — NORETHINDRONE ACETATE AND ETHINYL ESTRADIOL 1MG-20(21)
1 KIT ORAL DAILY
Qty: 1 PACKAGE | Refills: 11 | Status: SHIPPED | OUTPATIENT
Start: 2018-01-27 | End: 2018-12-29

## 2018-01-27 NOTE — PROGRESS NOTES
[facility], [place], [facility]  Midwifery Focused Gynecology Problem Exam    Nahomy Dong is a 24year old female presenting for Gyn Problem (x 2 months with abnormal discharge. Thought yeast infection with no improvement with monistat.  Some days with sweet smell and Smoker    Smokeless tobacco: Never Used    Alcohol use Yes  0.6 oz/week    1 Standard drinks or equivalent per week         Comment: prior to pregnancy socially    Drug use: No    Sexual activity: Not on file     Other Topics Concern     Service No Culture  PRESCRIPTIONS:     Signed Prescriptions Disp Refills    Norethin Ace-Eth Estrad-FE 1-20 MG-MCG Oral Tab 1 Package 11      Sig: Take 1 tablet by mouth daily.          IMAGING/ REFERRALS:    None     Savage Ren  1/27/2018  11:11 AM

## 2018-01-28 RX ORDER — METRONIDAZOLE 500 MG/1
2000 TABLET ORAL ONCE
Qty: 4 TABLET | Refills: 0 | Status: SHIPPED | OUTPATIENT
Start: 2018-01-28 | End: 2018-01-28

## 2018-01-29 ENCOUNTER — TELEPHONE (OUTPATIENT)
Dept: OBGYN CLINIC | Facility: CLINIC | Age: 28
End: 2018-01-29

## 2018-01-29 LAB — TRICHOMONAS SCREEN: POSITIVE

## 2018-01-29 NOTE — TELEPHONE ENCOUNTER
----- Message from Nohelia Patel CNM sent at 1/28/2018  2:30 PM CST -----  Positive for trich. RX sent to pharmacy. Please contact to inform and recommend repeat gc/ct screen as well.  Please place the order with lab or offer her a repeat office visit t

## 2018-01-29 NOTE — TELEPHONE ENCOUNTER
Notified pt of cx +trich & rx sent to pharmacy & MBW instructions. Pt req tx for partner Mace Cho 11/8/1977 (nka). Offered appt &/or repeat std testing. Pt declines for now & will call back. Rx called in to pharmacy for partner.  Pt verbalized an unders

## 2018-04-05 ENCOUNTER — HOSPITAL ENCOUNTER (EMERGENCY)
Facility: HOSPITAL | Age: 28
Discharge: HOME | End: 2018-04-05
Payer: MEDICAID

## 2018-04-05 VITALS
HEART RATE: 92 BPM | DIASTOLIC BLOOD PRESSURE: 78 MMHG | OXYGEN SATURATION: 100 % | HEIGHT: 64 IN | TEMPERATURE: 99 F | WEIGHT: 172 LBS | BODY MASS INDEX: 29.37 KG/M2 | SYSTOLIC BLOOD PRESSURE: 116 MMHG | RESPIRATION RATE: 18 BRPM

## 2018-04-05 DIAGNOSIS — J02.0 STREP PHARYNGITIS: Primary | ICD-10-CM

## 2018-04-05 PROCEDURE — 87430 STREP A AG IA: CPT

## 2018-04-05 PROCEDURE — 99283 EMERGENCY DEPT VISIT LOW MDM: CPT

## 2018-04-05 RX ORDER — AMOXICILLIN 875 MG/1
875 TABLET, COATED ORAL 2 TIMES DAILY
Qty: 20 TABLET | Refills: 0 | Status: SHIPPED | OUTPATIENT
Start: 2018-04-05 | End: 2018-04-15

## 2018-04-05 NOTE — ED PROVIDER NOTES
Patient Seen in: [facility] Emergency Department    History   CC: sore throat  HPI: Ami Earnest 34year old female  who presents to the ER c/o sore throat, cough, generalized body aches that have been present for the last 5 days.   States her 1804]  BP: 116/78  Pulse: 92  Resp: 18  Temp: 98.8 °F (37.1 °C)  Temp src: Oral  SpO2: 100 %  O2 Device: None (Room air)    Current:/78   Pulse 92   Temp 98.8 °F (37.1 °C) (Oral)   Resp 18   Ht 162.6 cm (5' 4\")   Wt 78 kg   SpO2 100%   BMI 29.52 kg/ Discharge Medication List    START taking these medications    amoxicillin 875 MG Oral Tab  Take 1 tablet (875 mg total) by mouth 2 (two) times daily.   Qty: 20 tablet Refills: 0

## 2018-04-09 ENCOUNTER — OFFICE VISIT (OUTPATIENT)
Dept: FAMILY MEDICINE CLINIC | Facility: CLINIC | Age: 28
End: 2018-04-09

## 2018-04-09 VITALS
SYSTOLIC BLOOD PRESSURE: 98 MMHG | HEIGHT: 66 IN | TEMPERATURE: 98 F | DIASTOLIC BLOOD PRESSURE: 67 MMHG | BODY MASS INDEX: 29.41 KG/M2 | WEIGHT: 183 LBS | HEART RATE: 86 BPM | RESPIRATION RATE: 20 BRPM

## 2018-04-09 DIAGNOSIS — J02.0 STREP PHARYNGITIS: ICD-10-CM

## 2018-04-09 PROCEDURE — 99213 OFFICE O/P EST LOW 20 MIN: CPT | Performed by: FAMILY MEDICINE

## 2018-04-09 PROCEDURE — 1111F DSCHRG MED/CURRENT MED MERGE: CPT | Performed by: FAMILY MEDICINE

## 2018-04-09 PROCEDURE — 99212 OFFICE O/P EST SF 10 MIN: CPT | Performed by: FAMILY MEDICINE

## 2018-04-09 NOTE — PROGRESS NOTES
HPI:    Patient ID: Joan Nichole is a 34year old female. Pt presents for follow up from the ER for a sore throat. Was diagnosed with strep throat. Patient is being treated with amoxicillin. Patient states symptoms are better. NO fevers.  Needs a not sorethroat  Other systems are as noted in HPI.   Constitutional and vital signs reviewed.       All other systems reviewed and negative except as noted above.     PSFH elements reviewed from today and agreed except as otherwise stated in HPI.     Medication Result Value     POCT Rapid Strep Positive (*)      All other components within normal limits          MDM     + Rapid strep in the emergency department. General  strep pharyngitis instructions reviewed as well as follow-up and return precautions.   Caesar prescriptions requested or ordered in this encounter    Imaging & Referrals:  None       U#[id number]

## 2018-09-17 ENCOUNTER — OFFICE VISIT (OUTPATIENT)
Dept: INTERNAL MEDICINE CLINIC | Facility: CLINIC | Age: 28
End: 2018-09-17
Payer: MEDICAID

## 2018-09-17 VITALS
SYSTOLIC BLOOD PRESSURE: 116 MMHG | RESPIRATION RATE: 18 BRPM | TEMPERATURE: 98 F | BODY MASS INDEX: 34.99 KG/M2 | HEIGHT: 65 IN | HEART RATE: 73 BPM | WEIGHT: 210 LBS | DIASTOLIC BLOOD PRESSURE: 78 MMHG

## 2018-09-17 DIAGNOSIS — M25.532 BILATERAL WRIST PAIN: Primary | ICD-10-CM

## 2018-09-17 DIAGNOSIS — M25.531 BILATERAL WRIST PAIN: Primary | ICD-10-CM

## 2018-09-17 PROCEDURE — 99212 OFFICE O/P EST SF 10 MIN: CPT | Performed by: INTERNAL MEDICINE

## 2018-09-17 PROCEDURE — 99213 OFFICE O/P EST LOW 20 MIN: CPT | Performed by: INTERNAL MEDICINE

## 2018-09-17 RX ORDER — NAPROXEN 500 MG/1
500 TABLET ORAL 2 TIMES DAILY WITH MEALS
Qty: 30 TABLET | Refills: 0 | Status: SHIPPED | OUTPATIENT
Start: 2018-09-17 | End: 2018-12-29

## 2018-09-17 NOTE — PROGRESS NOTES
Marcela Stratton is a 34year old female. Patient presents with:  Wrist Pain: pain with movement x 6 months. Derm Problem: itchy bumps to arms and upper torso, hydrocortisone no longer effective.     HPI:   For approximately 6 months, she has had intermitt ecchymosis soft tissue swelling or deformity. No localizing tenderness or palpable abnormality.   Normal range of motion both actively and passively to flexion and extension and both radial and ulnar deviation without limitation or pain      ASSESSMENT AND

## 2018-09-17 NOTE — PATIENT INSTRUCTIONS
Please avoid painful activity and apply ice to both wrists 2-3 times daily. Please take naproxen 500 mg twice daily with meals. Call if no better.

## 2018-12-29 ENCOUNTER — APPOINTMENT (OUTPATIENT)
Dept: LAB | Facility: HOSPITAL | Age: 28
End: 2018-12-29
Attending: ADVANCED PRACTICE MIDWIFE
Payer: MEDICAID

## 2018-12-29 ENCOUNTER — OFFICE VISIT (OUTPATIENT)
Dept: OBGYN CLINIC | Facility: CLINIC | Age: 28
End: 2018-12-29
Payer: MEDICAID

## 2018-12-29 VITALS
HEART RATE: 109 BPM | HEIGHT: 65 IN | WEIGHT: 216 LBS | DIASTOLIC BLOOD PRESSURE: 80 MMHG | SYSTOLIC BLOOD PRESSURE: 132 MMHG | BODY MASS INDEX: 35.99 KG/M2

## 2018-12-29 DIAGNOSIS — Z11.3 ROUTINE SCREENING FOR STI (SEXUALLY TRANSMITTED INFECTION): ICD-10-CM

## 2018-12-29 DIAGNOSIS — E66.9 OBESITY (BMI 30.0-34.9): ICD-10-CM

## 2018-12-29 DIAGNOSIS — R10.9 ABDOMINAL CRAMPING: ICD-10-CM

## 2018-12-29 DIAGNOSIS — N91.1 SECONDARY AMENORRHEA: ICD-10-CM

## 2018-12-29 DIAGNOSIS — Z01.411 ENCOUNTER FOR GYNECOLOGICAL EXAMINATION WITH ABNORMAL FINDING: Primary | ICD-10-CM

## 2018-12-29 DIAGNOSIS — N91.2 AMENORRHEA: ICD-10-CM

## 2018-12-29 LAB
ALBUMIN SERPL BCP-MCNC: 3.9 G/DL (ref 3.5–4.8)
ALBUMIN/GLOB SERPL: 1.1 {RATIO} (ref 1–2)
ALP SERPL-CCNC: 111 U/L (ref 32–100)
ALT SERPL-CCNC: 35 U/L (ref 14–54)
ANION GAP SERPL CALC-SCNC: 7 MMOL/L (ref 0–18)
APPEARANCE: CLEAR
AST SERPL-CCNC: 27 U/L (ref 15–41)
BILIRUB SERPL-MCNC: 0.7 MG/DL (ref 0.3–1.2)
BUN SERPL-MCNC: 8 MG/DL (ref 8–20)
BUN/CREAT SERPL: 10.8 (ref 10–20)
CALCIUM SERPL-MCNC: 8.9 MG/DL (ref 8.5–10.5)
CHLORIDE SERPL-SCNC: 105 MMOL/L (ref 95–110)
CO2 SERPL-SCNC: 28 MMOL/L (ref 22–32)
CONTROL LINE PRESENT WITH A CLEAR BACKGROUND (YES/NO): YES YES/NO
CREAT SERPL-MCNC: 0.74 MG/DL (ref 0.5–1.5)
EST. AVERAGE GLUCOSE BLD GHB EST-MCNC: 103 MG/DL (ref 68–126)
FSH SERPL-ACNC: 7.1 MIU/ML
GLOBULIN PLAS-MCNC: 3.5 G/DL (ref 2.5–3.7)
GLUCOSE SERPL-MCNC: 76 MG/DL (ref 70–99)
HBA1C MFR BLD HPLC: 5.2 % (ref ?–5.7)
KIT LOT #: NORMAL NUMERIC
LH SERPL-ACNC: 9.6 MIU/ML
MULTISTIX LOT#: NORMAL NUMERIC
OSMOLALITY UR CALC.SUM OF ELEC: 287 MOSM/KG (ref 275–295)
PATIENT FASTING: NO
PH, URINE: 6 (ref 4.5–8)
POTASSIUM SERPL-SCNC: 3.7 MMOL/L (ref 3.3–5.1)
PREGNANCY TEST, URINE: NEGATIVE
PROLACTIN SERPL-MCNC: 5 NG/ML (ref 1.4–24.2)
PROT SERPL-MCNC: 7.4 G/DL (ref 5.9–8.4)
SODIUM SERPL-SCNC: 140 MMOL/L (ref 136–144)
SPECIFIC GRAVITY: 1.02 (ref 1–1.03)
TSH SERPL-ACNC: 0.63 UIU/ML (ref 0.45–5.33)
URINE-COLOR: YELLOW
UROBILINOGEN,SEMI-QN: 0.2 MG/DL (ref 0–1.9)

## 2018-12-29 PROCEDURE — 87389 HIV-1 AG W/HIV-1&-2 AB AG IA: CPT

## 2018-12-29 PROCEDURE — 87340 HEPATITIS B SURFACE AG IA: CPT

## 2018-12-29 PROCEDURE — 81002 URINALYSIS NONAUTO W/O SCOPE: CPT | Performed by: ADVANCED PRACTICE MIDWIFE

## 2018-12-29 PROCEDURE — 84443 ASSAY THYROID STIM HORMONE: CPT

## 2018-12-29 PROCEDURE — 83002 ASSAY OF GONADOTROPIN (LH): CPT

## 2018-12-29 PROCEDURE — 81025 URINE PREGNANCY TEST: CPT | Performed by: ADVANCED PRACTICE MIDWIFE

## 2018-12-29 PROCEDURE — 80053 COMPREHEN METABOLIC PANEL: CPT

## 2018-12-29 PROCEDURE — 86780 TREPONEMA PALLIDUM: CPT

## 2018-12-29 PROCEDURE — 84146 ASSAY OF PROLACTIN: CPT

## 2018-12-29 PROCEDURE — 83036 HEMOGLOBIN GLYCOSYLATED A1C: CPT

## 2018-12-29 PROCEDURE — 83001 ASSAY OF GONADOTROPIN (FSH): CPT

## 2018-12-29 PROCEDURE — 86803 HEPATITIS C AB TEST: CPT

## 2018-12-29 PROCEDURE — 36415 COLL VENOUS BLD VENIPUNCTURE: CPT

## 2018-12-29 PROCEDURE — 99395 PREV VISIT EST AGE 18-39: CPT | Performed by: ADVANCED PRACTICE MIDWIFE

## 2018-12-29 NOTE — PROGRESS NOTES
HPI:   Josiane Serrato is a 34year old female who presents for annual exam    No periods for 3-4 months and has had 34 # weight gain this year. Does have intermittent lower abdominal cramping.   Also reports is more \"shrestha\" but denies feeling out of co Tobacco Use      Smoking status: Never Smoker      Smokeless tobacco: Never Used    Alcohol use:  Yes      Alcohol/week: 0.6 oz      Types: 1 Standard drinks or equivalent per week      Comment: prior to pregnancy socially    Drug use: No      GYN hx:    Hx non-tender bilaterally                R/V: normal perineum, no hemorrhoids    EXTREMITIES: no cyanosis, clubbing or edema  NEURO: Oriented times three    ASSESSMENT AND PLAN:     Moises De La O is a 34year old female normal GYN exam except    (Z01.411)

## 2018-12-30 LAB
C TRACH DNA SPEC QL NAA+PROBE: NEGATIVE
N GONORRHOEA DNA SPEC QL NAA+PROBE: NEGATIVE

## 2018-12-31 LAB
GENITAL VAGINOSIS SCREEN: POSITIVE
HBV SURFACE AG SERPL QL IA: NONREACTIVE
HCV AB SERPL QL IA: NONREACTIVE
HIV1+2 AB SERPL QL IA: NONREACTIVE
T PALLIDUM AB SER QL: NEGATIVE
TRICHOMONAS SCREEN: NEGATIVE

## 2019-01-02 ENCOUNTER — TELEPHONE (OUTPATIENT)
Dept: OBGYN CLINIC | Facility: CLINIC | Age: 29
End: 2019-01-02

## 2019-01-02 RX ORDER — METRONIDAZOLE 500 MG/1
500 TABLET ORAL 2 TIMES DAILY
Qty: 14 TABLET | Refills: 0 | Status: SHIPPED | OUTPATIENT
Start: 2019-01-02 | End: 2019-01-09

## 2019-01-02 NOTE — TELEPHONE ENCOUNTER
Notified pt of results & BR instructions. Pt req oral metronidazole. Pharmacy confirmed. Instructed to avoid etoh during treatment & for 3 days following. Pt states she will call if decides to do provera challenge after 2 weeks protected intercourse.  Pt ve

## 2019-01-02 NOTE — TELEPHONE ENCOUNTER
----- Message from ZAKI Meneses sent at 12/31/2018 12:00 PM CST -----  Please advise patient of lab results - find out if she would like oral or vaginal Metronidazole and place order for her preferred. Remind her no ETOH during use!

## 2019-01-02 NOTE — TELEPHONE ENCOUNTER
----- Message from ZAKI Hassan sent at 12/31/2018 12:02 PM CST -----  Please review all of these when revieweing the vaginosis labs. Advise her that hormones are all normal, no evidence of thyroid problems, or ovarian dysfunction.    She should

## 2019-01-03 PROBLEM — E66.811 OBESITY (BMI 30.0-34.9): Status: ACTIVE | Noted: 2019-01-03

## 2019-01-03 PROBLEM — N91.1 SECONDARY AMENORRHEA: Status: ACTIVE | Noted: 2019-01-03

## 2019-01-03 PROBLEM — E66.9 OBESITY (BMI 30.0-34.9): Status: ACTIVE | Noted: 2019-01-03

## 2019-05-01 ENCOUNTER — HOSPITAL ENCOUNTER (EMERGENCY)
Facility: HOSPITAL | Age: 29
Discharge: HOME | End: 2019-05-01
Attending: PHYSICIAN ASSISTANT
Payer: MEDICAID

## 2019-05-01 ENCOUNTER — APPOINTMENT (OUTPATIENT)
Dept: ULTRASOUND IMAGING | Facility: HOSPITAL | Age: 29
End: 2019-05-01
Attending: PHYSICIAN ASSISTANT
Payer: MEDICAID

## 2019-05-01 VITALS
HEIGHT: 65 IN | DIASTOLIC BLOOD PRESSURE: 75 MMHG | BODY MASS INDEX: 35.99 KG/M2 | HEART RATE: 84 BPM | RESPIRATION RATE: 18 BRPM | SYSTOLIC BLOOD PRESSURE: 123 MMHG | WEIGHT: 216 LBS | OXYGEN SATURATION: 99 % | TEMPERATURE: 98 F

## 2019-05-01 DIAGNOSIS — M79.662 PAIN OF LEFT CALF: Primary | ICD-10-CM

## 2019-05-01 PROCEDURE — 93971 EXTREMITY STUDY: CPT | Performed by: PHYSICIAN ASSISTANT

## 2019-05-01 PROCEDURE — 99284 EMERGENCY DEPT VISIT MOD MDM: CPT

## 2019-05-01 RX ORDER — IBUPROFEN 600 MG/1
TABLET ORAL
Qty: 20 TABLET | Refills: 0 | Status: SHIPPED | OUTPATIENT
Start: 2019-05-01 | End: 2020-05-05 | Stop reason: ALTCHOICE

## 2019-05-01 NOTE — ED INITIAL ASSESSMENT (HPI)
Pt reports feeling a sharp stabbing pain twice today in left calf. Denies injury or any recent travel. States pain lasted for approximately 1 minute.

## 2019-05-01 NOTE — ED PROVIDER NOTES
Patient Seen in: [facility] AND [facility] Emergency Department    History   Patient presents with:  Lower Extremity Injury (musculoskeletal): atraumatic calf pain    Stated Complaint:     HPI    82-year-old female presents with chief complaint of left calf pain HPI.    Physical Exam     ED Triage Vitals [05/01/19 1315]   /75   Pulse 87   Resp 18   Temp 98.1 °F (36.7 °C)   Temp src Oral   SpO2 99 %   O2 Device None (Room air)       Current:/75   Pulse 84   Temp 98.1 °F (36.7 °C) (Oral)   Resp 18   Ht 1 Patient exhibits normal speech. Skin: Skin is normal to inspection, except as documented. Warm and dry. No obvious rash.     Differential includes: DVT vs. Bakers cyst versus muscle spasm versus contusion      ED Course   Labs Reviewed - No data to displ

## 2019-05-01 NOTE — ED NOTES
Patient had stabbing pains in her left calf this morning. States this happened 2 weeks ago as well. Denies injury, recent travel, redness or swelling.

## 2019-07-15 ENCOUNTER — OFFICE VISIT (OUTPATIENT)
Dept: OBGYN CLINIC | Facility: CLINIC | Age: 29
End: 2019-07-15
Payer: MEDICAID

## 2019-07-15 VITALS
HEART RATE: 94 BPM | BODY MASS INDEX: 38 KG/M2 | DIASTOLIC BLOOD PRESSURE: 81 MMHG | SYSTOLIC BLOOD PRESSURE: 116 MMHG | WEIGHT: 227 LBS

## 2019-07-15 DIAGNOSIS — N91.4 SECONDARY OLIGOMENORRHEA: ICD-10-CM

## 2019-07-15 DIAGNOSIS — Z32.00 PREGNANCY EXAMINATION OR TEST, PREGNANCY UNCONFIRMED: ICD-10-CM

## 2019-07-15 DIAGNOSIS — N89.8 VAGINAL DISCHARGE: ICD-10-CM

## 2019-07-15 DIAGNOSIS — Z11.3 SCREEN FOR STD (SEXUALLY TRANSMITTED DISEASE): ICD-10-CM

## 2019-07-15 DIAGNOSIS — L53.9 BREAST ERYTHEMA: Primary | ICD-10-CM

## 2019-07-15 LAB
CONTROL LINE PRESENT WITH A CLEAR BACKGROUND (YES/NO): YES YES/NO
KIT LOT #: 0 NUMERIC
PREGNANCY TEST, URINE: NEGATIVE

## 2019-07-15 PROCEDURE — 81025 URINE PREGNANCY TEST: CPT | Performed by: ADVANCED PRACTICE MIDWIFE

## 2019-07-15 PROCEDURE — 99214 OFFICE O/P EST MOD 30 MIN: CPT | Performed by: ADVANCED PRACTICE MIDWIFE

## 2019-07-15 RX ORDER — METRONIDAZOLE 500 MG/1
500 TABLET ORAL 2 TIMES DAILY
Qty: 14 TABLET | Refills: 0 | Status: SHIPPED | OUTPATIENT
Start: 2019-07-15 | End: 2019-07-22

## 2019-07-16 LAB
C TRACH DNA SPEC QL NAA+PROBE: NEGATIVE
N GONORRHOEA DNA SPEC QL NAA+PROBE: NEGATIVE

## 2019-07-17 ENCOUNTER — TELEPHONE (OUTPATIENT)
Dept: OBGYN CLINIC | Facility: CLINIC | Age: 29
End: 2019-07-17

## 2019-07-17 LAB
GENITAL VAGINOSIS SCREEN: NEGATIVE
TRICHOMONAS SCREEN: NEGATIVE

## 2019-07-17 NOTE — TELEPHONE ENCOUNTER
----- Message from Shanika Edmond CNM sent at 7/17/2019  9:30 AM CDT -----  Normal vaginal cultures. Please call to inform.

## 2019-07-17 NOTE — PROGRESS NOTES
[facility], [facility]  Midwifery Focused Gynecology Problem Exam    Patricia Lin is a 34year old female presenting for Gyn Exam (vaginal discharge, itching and odor, bump on breast)  .     HPI:   Patient presents with:  Gyn Exam: vaginal discharge, itching a flashes, malaise or night sweats  ENT ROS: normal  Breast ROS: positive for - new or changing breast lumps and HPI  Gastrointestinal ROS: no abdominal pain, change in bowel habits, or black or bloody stools  Genito-Urinary ROS: positive for - change in men examination or test, pregnancy unconfirmed  Plan: URINE PREGNANCY TEST    (N89.8) Vaginal discharge  Plan: GENITAL VAGINOSIS SCREEN, GENITAL VAGINOSIS         SCREEN    (Z11.3) Screen for STD (sexually transmitted disease)  Plan: CHLAMYDIA/GONOCOCCUS, MARCIE,

## 2019-07-29 ENCOUNTER — TELEPHONE (OUTPATIENT)
Dept: OBGYN CLINIC | Facility: CLINIC | Age: 29
End: 2019-07-29

## 2019-07-29 NOTE — TELEPHONE ENCOUNTER
The Patient is schedule on 8/5 for US PELVIS EV (TRNS ABD AND ENDOVAG) (RTW=[id number]) - Dx code N91.4 needs approval. Thank you [facility] Verification Dept. @ [phone]

## 2019-08-02 NOTE — TELEPHONE ENCOUNTER
Sandra From insurance verification dept is calling to speak to a nurse regarding a prior authorization [id number]

## 2019-08-02 NOTE — TELEPHONE ENCOUNTER
Called and spoke with Baltazra. States that she is waiting on authorization for u/s. Informed Baltazar that authorization for this is done by managed care. Baltazar did not know who managed care was. Informed Baltazar I will call managed care and check status.      Called Kenneth

## 2019-08-05 ENCOUNTER — HOSPITAL ENCOUNTER (OUTPATIENT)
Dept: ULTRASOUND IMAGING | Facility: HOSPITAL | Age: 29
Discharge: HOME | End: 2019-08-05
Attending: ADVANCED PRACTICE MIDWIFE
Payer: MEDICAID

## 2019-08-05 DIAGNOSIS — N91.4 SECONDARY OLIGOMENORRHEA: ICD-10-CM

## 2019-08-05 PROCEDURE — 76830 TRANSVAGINAL US NON-OB: CPT | Performed by: ADVANCED PRACTICE MIDWIFE

## 2019-08-05 PROCEDURE — 76856 US EXAM PELVIC COMPLETE: CPT | Performed by: ADVANCED PRACTICE MIDWIFE

## 2019-08-11 ENCOUNTER — HOSPITAL ENCOUNTER (OUTPATIENT)
Dept: MAMMOGRAPHY | Facility: HOSPITAL | Age: 29
Discharge: HOME | End: 2019-08-11
Attending: ADVANCED PRACTICE MIDWIFE
Payer: MEDICAID

## 2019-08-11 DIAGNOSIS — L53.9 BREAST ERYTHEMA: ICD-10-CM

## 2019-08-11 PROCEDURE — 77063 BREAST TOMOSYNTHESIS BI: CPT | Performed by: ADVANCED PRACTICE MIDWIFE

## 2019-08-11 PROCEDURE — 77067 SCR MAMMO BI INCL CAD: CPT | Performed by: ADVANCED PRACTICE MIDWIFE

## 2019-08-14 ENCOUNTER — TELEPHONE (OUTPATIENT)
Dept: OBGYN CLINIC | Facility: CLINIC | Age: 29
End: 2019-08-14

## 2019-08-14 NOTE — TELEPHONE ENCOUNTER
----- Message from Khushi Dawson CNM sent at 8/14/2019  9:44 AM CDT -----  Normal mammogram results.

## 2019-10-23 ENCOUNTER — HOSPITAL ENCOUNTER (EMERGENCY)
Facility: HOSPITAL | Age: 29
Discharge: HOME | End: 2019-10-23
Attending: PHYSICIAN ASSISTANT
Payer: MEDICAID

## 2019-10-23 VITALS
OXYGEN SATURATION: 100 % | HEART RATE: 80 BPM | TEMPERATURE: 98 F | RESPIRATION RATE: 18 BRPM | BODY MASS INDEX: 37 KG/M2 | SYSTOLIC BLOOD PRESSURE: 133 MMHG | DIASTOLIC BLOOD PRESSURE: 65 MMHG | WEIGHT: 220 LBS

## 2019-10-23 DIAGNOSIS — J02.0 STREP PHARYNGITIS: Primary | ICD-10-CM

## 2019-10-23 PROCEDURE — 99283 EMERGENCY DEPT VISIT LOW MDM: CPT

## 2019-10-23 PROCEDURE — 87430 STREP A AG IA: CPT

## 2019-10-23 RX ORDER — IBUPROFEN 600 MG/1
TABLET ORAL
Qty: 20 TABLET | Refills: 0 | Status: SHIPPED | OUTPATIENT
Start: 2019-10-23 | End: 2020-05-05 | Stop reason: ALTCHOICE

## 2019-10-23 RX ORDER — PENICILLIN V POTASSIUM 500 MG/1
500 TABLET ORAL 2 TIMES DAILY
Qty: 20 TABLET | Refills: 0 | Status: SHIPPED | OUTPATIENT
Start: 2019-10-23 | End: 2019-11-02

## 2019-10-23 NOTE — ED PROVIDER NOTES
Patient Seen in: [facility] Emergency Department    History   Patient presents with:  Sore Throat    Stated Complaint: sore throat     HPI    47-year-old female presents with chief complaint of sore throat. Onset 2 days ago.   Patient denies sick noted above. PSFH elements reviewed from today and agreed except as otherwise stated in HPI.     Physical Exam     ED Triage Vitals [10/23/19 1548]   /65   Pulse 80   Resp 18   Temp 98 °F (36.7 °C)   Temp src    SpO2 100 %   O2 Device None (Room ai exam remained stable over serial reexaminations as previously documented. Results reviewed need for follow-up discussed with patient.     Disposition and Plan     Clinical Impression:  Strep pharyngitis  (primary encounter diagnosis)    Disposition:  Disch

## 2020-01-15 ENCOUNTER — APPOINTMENT (OUTPATIENT)
Dept: GENERAL RADIOLOGY | Facility: HOSPITAL | Age: 30
End: 2020-01-15
Payer: MEDICAID

## 2020-01-15 ENCOUNTER — APPOINTMENT (OUTPATIENT)
Dept: CT IMAGING | Facility: HOSPITAL | Age: 30
End: 2020-01-15
Attending: EMERGENCY MEDICINE
Payer: MEDICAID

## 2020-01-15 ENCOUNTER — HOSPITAL ENCOUNTER (EMERGENCY)
Facility: HOSPITAL | Age: 30
Discharge: HOME | End: 2020-01-15
Payer: MEDICAID

## 2020-01-15 VITALS
DIASTOLIC BLOOD PRESSURE: 80 MMHG | TEMPERATURE: 98 F | SYSTOLIC BLOOD PRESSURE: 124 MMHG | RESPIRATION RATE: 20 BRPM | HEART RATE: 86 BPM | OXYGEN SATURATION: 99 %

## 2020-01-15 DIAGNOSIS — R07.89 ATYPICAL CHEST PAIN: Primary | ICD-10-CM

## 2020-01-15 LAB
ANION GAP SERPL CALC-SCNC: 5 MMOL/L (ref 0–18)
BASOPHILS # BLD AUTO: 0.02 X10(3) UL (ref 0–0.2)
BASOPHILS NFR BLD AUTO: 0.3 %
BUN BLD-MCNC: 9 MG/DL (ref 7–18)
BUN/CREAT SERPL: 12 (ref 10–20)
CALCIUM BLD-MCNC: 9.1 MG/DL (ref 8.5–10.1)
CHLORIDE SERPL-SCNC: 107 MMOL/L (ref 98–112)
CO2 SERPL-SCNC: 28 MMOL/L (ref 21–32)
CREAT BLD-MCNC: 0.75 MG/DL (ref 0.55–1.02)
D DIMER PPP FEU-MCNC: 0.51 UG/ML FEU (ref ?–0.5)
DEPRECATED RDW RBC AUTO: 38.2 FL (ref 35.1–46.3)
EOSINOPHIL # BLD AUTO: 0.17 X10(3) UL (ref 0–0.7)
EOSINOPHIL NFR BLD AUTO: 2.2 %
ERYTHROCYTE [DISTWIDTH] IN BLOOD BY AUTOMATED COUNT: 12.5 % (ref 11–15)
GLUCOSE BLD-MCNC: 70 MG/DL (ref 70–99)
HCT VFR BLD AUTO: 39.1 % (ref 35–48)
HGB BLD-MCNC: 13.2 G/DL (ref 12–16)
IMM GRANULOCYTES # BLD AUTO: 0.01 X10(3) UL (ref 0–1)
IMM GRANULOCYTES NFR BLD: 0.1 %
LYMPHOCYTES # BLD AUTO: 2.18 X10(3) UL (ref 1–4)
LYMPHOCYTES NFR BLD AUTO: 27.7 %
MCH RBC QN AUTO: 27.8 PG (ref 26–34)
MCHC RBC AUTO-ENTMCNC: 33.8 G/DL (ref 31–37)
MCV RBC AUTO: 82.3 FL (ref 80–100)
MONOCYTES # BLD AUTO: 0.58 X10(3) UL (ref 0.1–1)
MONOCYTES NFR BLD AUTO: 7.4 %
NEUTROPHILS # BLD AUTO: 4.9 X10 (3) UL (ref 1.5–7.7)
NEUTROPHILS # BLD AUTO: 4.9 X10(3) UL (ref 1.5–7.7)
NEUTROPHILS NFR BLD AUTO: 62.3 %
OSMOLALITY SERPL CALC.SUM OF ELEC: 287 MOSM/KG (ref 275–295)
PLATELET # BLD AUTO: 324 10(3)UL (ref 150–450)
POTASSIUM SERPL-SCNC: 4.2 MMOL/L (ref 3.5–5.1)
RBC # BLD AUTO: 4.75 X10(6)UL (ref 3.8–5.3)
SODIUM SERPL-SCNC: 140 MMOL/L (ref 136–145)
TROPONIN I SERPL-MCNC: <0.045 NG/ML (ref ?–0.04)
WBC # BLD AUTO: 7.9 X10(3) UL (ref 4–11)

## 2020-01-15 PROCEDURE — 85025 COMPLETE CBC W/AUTO DIFF WBC: CPT

## 2020-01-15 PROCEDURE — 71046 X-RAY EXAM CHEST 2 VIEWS: CPT

## 2020-01-15 PROCEDURE — 36415 COLL VENOUS BLD VENIPUNCTURE: CPT

## 2020-01-15 PROCEDURE — 93005 ELECTROCARDIOGRAM TRACING: CPT

## 2020-01-15 PROCEDURE — 84484 ASSAY OF TROPONIN QUANT: CPT

## 2020-01-15 PROCEDURE — 99285 EMERGENCY DEPT VISIT HI MDM: CPT

## 2020-01-15 PROCEDURE — 85379 FIBRIN DEGRADATION QUANT: CPT | Performed by: EMERGENCY MEDICINE

## 2020-01-15 PROCEDURE — 93010 ELECTROCARDIOGRAM REPORT: CPT | Performed by: INTERNAL MEDICINE

## 2020-01-15 PROCEDURE — 80048 BASIC METABOLIC PNL TOTAL CA: CPT

## 2020-01-15 PROCEDURE — 71260 CT THORAX DX C+: CPT | Performed by: EMERGENCY MEDICINE

## 2020-01-15 RX ORDER — LIDOCAINE 50 MG/G
1 PATCH TOPICAL EVERY 24 HOURS
Qty: 7 PATCH | Refills: 0 | Status: SHIPPED | OUTPATIENT
Start: 2020-01-15 | End: 2020-01-22

## 2020-01-15 RX ORDER — MELOXICAM 7.5 MG/1
7.5 TABLET ORAL DAILY PRN
Qty: 14 TABLET | Refills: 0 | Status: SHIPPED | OUTPATIENT
Start: 2020-01-15 | End: 2020-01-29

## 2020-01-15 NOTE — ED PROVIDER NOTES
Patient Seen in: [facility] AND [facility] Emergency Department    History   Patient presents with:  Chest Pain Angina    Stated Complaint: CHEST PAIN     HPI    61-year-old female without past medical history for evaluation of 5 months of left-sided pleuritic tung except as noted above. PSFH elements reviewed from today and agreed except as otherwise stated in HPI.     Physical Exam     ED Triage Vitals   BP 01/15/20 1345 121/60   Pulse 01/15/20 1345 90   Resp 01/15/20 1345 18   Temp 01/15/20 1345 98 °F (36.7 °C) INDICATIONS: Left sided chest pains X 5 months. TECHNIQUE:   Two views. FINDINGS: CARDIAC/VASC: No cardiac silhouette abnormality or cardiomegaly. Unremarkable pulmonary vasculature. MEDIAST/JAYNA: No visible mass or adenopathy.  LUNGS/PLEURA: No signif by (CST): Mitzi Hodges MD on 1/15/2020 at 20:10     Approved by (CST): Mitzi Hodges MD on 1/15/2020 at 20:13               MDM     DIFFERENTIAL DIAGNOSIS: After history and physical exam differential diagnosis includes but is not limited to ACS, VTE, a

## 2020-04-13 ENCOUNTER — TELEPHONE (OUTPATIENT)
Dept: OBGYN CLINIC | Facility: CLINIC | Age: 30
End: 2020-04-13

## 2020-04-14 ENCOUNTER — VIRTUAL PHONE E/M (OUTPATIENT)
Dept: OBGYN CLINIC | Facility: CLINIC | Age: 30
End: 2020-04-14
Payer: MEDICAID

## 2020-04-14 DIAGNOSIS — N92.6 MISSED MENSES: Primary | ICD-10-CM

## 2020-04-14 PROCEDURE — 99213 OFFICE O/P EST LOW 20 MIN: CPT | Performed by: ADVANCED PRACTICE MIDWIFE

## 2020-04-14 NOTE — PROGRESS NOTES
Virtual Telephone Check-In    Nate Gonzalez verbally consents to a Virtual/Telephone Check-In visit on 04/14/20. Patient understands and accepts financial responsibility for any deductible, co-insurance and/or co-pays associated with this service. Ovarian/uterine      Social History:   Social History    Tobacco Use      Smoking status: Never Smoker      Smokeless tobacco: Never Used    Alcohol use:  Yes      Alcohol/week: 1.0 standard drinks      Types: 1 Standard drinks or equivalent per week      C

## 2020-04-28 ENCOUNTER — HOSPITAL ENCOUNTER (OUTPATIENT)
Dept: ULTRASOUND IMAGING | Facility: HOSPITAL | Age: 30
Discharge: HOME | End: 2020-04-28
Attending: ADVANCED PRACTICE MIDWIFE
Payer: MEDICAID

## 2020-04-28 DIAGNOSIS — N92.6 MISSED MENSES: ICD-10-CM

## 2020-04-28 PROCEDURE — 76801 OB US < 14 WKS SINGLE FETUS: CPT | Performed by: ADVANCED PRACTICE MIDWIFE

## 2020-04-29 ENCOUNTER — TELEPHONE (OUTPATIENT)
Dept: OBGYN CLINIC | Facility: CLINIC | Age: 30
End: 2020-04-29

## 2020-04-29 DIAGNOSIS — Z34.90 PREGNANCY, UNSPECIFIED GESTATIONAL AGE: Primary | ICD-10-CM

## 2020-04-29 NOTE — TELEPHONE ENCOUNTER
Pt advised of U/S results and MBW's rec's. Pt would like to repeat U/S. Order placed. Pt agreed and voiced understanding.

## 2020-04-29 NOTE — TELEPHONE ENCOUNTER
----- Message from Lanie Balbuena CNM sent at 4/29/2020  3:42 PM CDT -----  Normal ultrasound - fetal heart tones are slightly low, but that can be normal for this gestational age, she can repeat ultrasound in one-two weeks for confirmation if desired.

## 2020-05-05 ENCOUNTER — NURSE ONLY (OUTPATIENT)
Dept: OBGYN CLINIC | Facility: CLINIC | Age: 30
End: 2020-05-05

## 2020-05-05 DIAGNOSIS — Z34.90 PREGNANCY, UNSPECIFIED GESTATIONAL AGE: Primary | ICD-10-CM

## 2020-05-05 NOTE — PROGRESS NOTES
Phone OB RN education visit completed, educational material reviewed. Pt verbalized understanding. Orders placed for NOB labs. Pt's pre pregnancy BMI is 37.2, orders placed for early 1hr gtt, HA1C, and nutrition consult.  Pt will have MFM consult with level

## 2020-05-11 ENCOUNTER — LAB ENCOUNTER (OUTPATIENT)
Dept: LAB | Facility: HOSPITAL | Age: 30
End: 2020-05-11
Attending: ADVANCED PRACTICE MIDWIFE
Payer: MEDICAID

## 2020-05-11 ENCOUNTER — TELEPHONE (OUTPATIENT)
Dept: OBGYN CLINIC | Facility: CLINIC | Age: 30
End: 2020-05-11

## 2020-05-11 DIAGNOSIS — Z34.81 ENCOUNTER FOR SUPERVISION OF OTHER NORMAL PREGNANCY IN FIRST TRIMESTER: Primary | ICD-10-CM

## 2020-05-11 DIAGNOSIS — Z34.90 PREGNANCY, UNSPECIFIED GESTATIONAL AGE: ICD-10-CM

## 2020-05-11 DIAGNOSIS — Z34.81 ENCOUNTER FOR SUPERVISION OF OTHER NORMAL PREGNANCY IN FIRST TRIMESTER: ICD-10-CM

## 2020-05-11 PROCEDURE — 86900 BLOOD TYPING SEROLOGIC ABO: CPT

## 2020-05-11 PROCEDURE — 87340 HEPATITIS B SURFACE AG IA: CPT

## 2020-05-11 PROCEDURE — 86762 RUBELLA ANTIBODY: CPT

## 2020-05-11 PROCEDURE — 36415 COLL VENOUS BLD VENIPUNCTURE: CPT

## 2020-05-11 PROCEDURE — 86780 TREPONEMA PALLIDUM: CPT

## 2020-05-11 PROCEDURE — 85025 COMPLETE CBC W/AUTO DIFF WBC: CPT

## 2020-05-11 PROCEDURE — 83036 HEMOGLOBIN GLYCOSYLATED A1C: CPT

## 2020-05-11 PROCEDURE — 87389 HIV-1 AG W/HIV-1&-2 AB AG IA: CPT

## 2020-05-11 PROCEDURE — 86803 HEPATITIS C AB TEST: CPT

## 2020-05-11 PROCEDURE — 82950 GLUCOSE TEST: CPT

## 2020-05-11 PROCEDURE — 86850 RBC ANTIBODY SCREEN: CPT

## 2020-05-11 PROCEDURE — 84702 CHORIONIC GONADOTROPIN TEST: CPT

## 2020-05-11 PROCEDURE — 84144 ASSAY OF PROGESTERONE: CPT

## 2020-05-11 PROCEDURE — 86901 BLOOD TYPING SEROLOGIC RH(D): CPT

## 2020-05-11 PROCEDURE — 87086 URINE CULTURE/COLONY COUNT: CPT

## 2020-05-11 NOTE — TELEPHONE ENCOUNTER
Pt would like a letter stating she needs a leave of absence due to Covid 19 exposure needs letter to say she needs bed rest for about 6 weeks to get over 1 trimester.  Can be put in [employer]

## 2020-05-12 ENCOUNTER — PATIENT MESSAGE (OUTPATIENT)
Dept: OBGYN CLINIC | Facility: CLINIC | Age: 30
End: 2020-05-12

## 2020-05-12 ENCOUNTER — OFFICE VISIT (OUTPATIENT)
Dept: OBGYN CLINIC | Facility: CLINIC | Age: 30
End: 2020-05-12
Payer: MEDICAID

## 2020-05-12 VITALS
HEIGHT: 65 IN | BODY MASS INDEX: 37.49 KG/M2 | HEART RATE: 97 BPM | DIASTOLIC BLOOD PRESSURE: 74 MMHG | WEIGHT: 225 LBS | SYSTOLIC BLOOD PRESSURE: 119 MMHG

## 2020-05-12 DIAGNOSIS — O20.9 BLEEDING IN EARLY PREGNANCY: Primary | ICD-10-CM

## 2020-05-12 DIAGNOSIS — Z34.81 ENCOUNTER FOR SUPERVISION OF OTHER NORMAL PREGNANCY IN FIRST TRIMESTER: ICD-10-CM

## 2020-05-12 PROCEDURE — 99213 OFFICE O/P EST LOW 20 MIN: CPT | Performed by: ADVANCED PRACTICE MIDWIFE

## 2020-05-12 NOTE — TELEPHONE ENCOUNTER
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 Pt has requested a 10 week  [facility] leave of absence due to 9 workers are COVID Positive.   Pt is requesting a medical certification note from the mIdwives stating she ahsan

## 2020-05-12 NOTE — TELEPHONE ENCOUNTER
Per MJ, pt is to have STAT Beta HCG quant and STAT Progesterone level added to yesterdays blood work. Lab stated they will be able to add on the blood work to yesterday's blood draw. Order entered.

## 2020-05-13 PROBLEM — O20.9 BLEEDING IN EARLY PREGNANCY: Status: ACTIVE | Noted: 2020-05-13

## 2020-05-13 PROBLEM — O20.9 BLEEDING IN EARLY PREGNANCY (HCC): Status: ACTIVE | Noted: 2020-05-13

## 2020-05-13 NOTE — PROGRESS NOTES
Lucilla Klinefelter is a 31year old female.     HPI:   Patient presents with:  Gyn Exam: Patient reports lower abdominal pain x3 weeks on and off with spotting       Pt presents with c/o in intermittent abdominal pain and spotting on and off for last 3 week diarrhea, constipation, blood in stool, abdominal distention, anal bleeding and rectal pain. Genitourinary: Positive for vaginal bleeding (spotting on and off).  Negative for bladder incontinence, dysuria, urgency, frequency, hematuria, flank pain, vagina affect this. There is no medical indication for her to be off of work. Covid is not reason to be off of work d/t pregnancy.  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

## 2020-05-13 NOTE — TELEPHONE ENCOUNTER
As I explained to her yesterday, we do not have a medical reason to take her off of work. Therefore we also do not have a medical reason to have her be required to work from home.  She can discuss with her employer if working from home is an option but we c

## 2020-05-13 NOTE — TELEPHONE ENCOUNTER
From: Jenny Bolaños  To: Maximiliano Pepe CNM  Sent: 5/12/2020 7:15 PM CDT  Subject: Other    Good evening,     After today’s visit and speaking with my , she suggested that I ask for a note on the grounds of working from home instead.  Harper Peterson

## 2020-05-19 ENCOUNTER — OFFICE VISIT (OUTPATIENT)
Dept: FAMILY MEDICINE CLINIC | Facility: CLINIC | Age: 30
End: 2020-05-19
Payer: MEDICAID

## 2020-05-19 VITALS
HEIGHT: 65 IN | DIASTOLIC BLOOD PRESSURE: 81 MMHG | HEART RATE: 93 BPM | SYSTOLIC BLOOD PRESSURE: 125 MMHG | TEMPERATURE: 98 F | BODY MASS INDEX: 37.32 KG/M2 | WEIGHT: 224 LBS

## 2020-05-19 DIAGNOSIS — Z00.00 ANNUAL PHYSICAL EXAM: Primary | ICD-10-CM

## 2020-05-19 PROCEDURE — 3074F SYST BP LT 130 MM HG: CPT | Performed by: FAMILY MEDICINE

## 2020-05-19 PROCEDURE — 3079F DIAST BP 80-89 MM HG: CPT | Performed by: FAMILY MEDICINE

## 2020-05-19 PROCEDURE — 99395 PREV VISIT EST AGE 18-39: CPT | Performed by: FAMILY MEDICINE

## 2020-05-19 PROCEDURE — 3008F BODY MASS INDEX DOCD: CPT | Performed by: FAMILY MEDICINE

## 2020-05-19 NOTE — PROGRESS NOTES
HPI:    Patient ID: Porfirio De La Paz is a 31year old female. Patient needs a norte for work   Patient needs a note for work. Patient has 2 small kids at home and no  for now. Also pregnant.    Denies any symptoms  also needs physical    Review

## 2020-06-16 ENCOUNTER — TELEPHONE (OUTPATIENT)
Dept: PERINATAL CARE | Facility: HOSPITAL | Age: 30
End: 2020-06-16

## 2020-06-16 ENCOUNTER — TELEPHONE (OUTPATIENT)
Dept: OBGYN CLINIC | Facility: CLINIC | Age: 30
End: 2020-06-16

## 2020-06-16 ENCOUNTER — INITIAL PRENATAL (OUTPATIENT)
Dept: OBGYN CLINIC | Facility: CLINIC | Age: 30
End: 2020-06-16
Payer: MEDICAID

## 2020-06-16 VITALS
DIASTOLIC BLOOD PRESSURE: 69 MMHG | SYSTOLIC BLOOD PRESSURE: 112 MMHG | BODY MASS INDEX: 37.32 KG/M2 | HEART RATE: 92 BPM | WEIGHT: 224 LBS | HEIGHT: 65 IN

## 2020-06-16 DIAGNOSIS — Z34.81 PRENATAL CARE, SUBSEQUENT PREGNANCY, FIRST TRIMESTER: Primary | ICD-10-CM

## 2020-06-16 PROCEDURE — 0502F SUBSEQUENT PRENATAL CARE: CPT | Performed by: ADVANCED PRACTICE MIDWIFE

## 2020-06-16 PROCEDURE — 81002 URINALYSIS NONAUTO W/O SCOPE: CPT | Performed by: ADVANCED PRACTICE MIDWIFE

## 2020-06-16 NOTE — PROGRESS NOTES
Denies pain or bleeding. Interested in 1st tri screen. Given contact info to schedule. Warning signs reviewed. Limited PE today normal. Pelvic deferred. Pap not due till 11/2020 and had pelvic few weeks ago with bleeding episode.  Has been working at home,

## 2020-06-16 NOTE — TELEPHONE ENCOUNTER
----- Message from [facility] sent at 6/16/2020  2:56 PM CDT -----  Regarding: MFM PRIOR AUTHORIZATION  PATIENT SCHEDULED 6/23/20 FOR FTS. WILL REQUIRE PRIOR AUTHORIZATION.     Jayden  PSR AT [facility]  [phone]

## 2020-06-23 ENCOUNTER — HOSPITAL ENCOUNTER (OUTPATIENT)
Dept: PERINATAL CARE | Facility: HOSPITAL | Age: 30
Discharge: HOME | End: 2020-06-23
Attending: OBSTETRICS & GYNECOLOGY
Payer: MEDICAID

## 2020-06-23 ENCOUNTER — HOSPITAL ENCOUNTER (OUTPATIENT)
Dept: PERINATAL CARE | Facility: HOSPITAL | Age: 30
Discharge: HOME | End: 2020-06-23
Attending: ADVANCED PRACTICE MIDWIFE
Payer: MEDICAID

## 2020-06-23 ENCOUNTER — TELEPHONE (OUTPATIENT)
Dept: OBGYN CLINIC | Facility: CLINIC | Age: 30
End: 2020-06-23

## 2020-06-23 VITALS
HEART RATE: 103 BPM | SYSTOLIC BLOOD PRESSURE: 134 MMHG | WEIGHT: 224 LBS | DIASTOLIC BLOOD PRESSURE: 76 MMHG | BODY MASS INDEX: 37 KG/M2

## 2020-06-23 DIAGNOSIS — Z20.6: ICD-10-CM

## 2020-06-23 DIAGNOSIS — O99.019 SICKLE CELL TRAIT IN MOTHER AFFECTING PREGNANCY (HCC): ICD-10-CM

## 2020-06-23 DIAGNOSIS — O99.210 OBESITY AFFECTING PREGNANCY, ANTEPARTUM: Primary | ICD-10-CM

## 2020-06-23 DIAGNOSIS — Z36.9 FIRST TRIMESTER SCREENING: Primary | ICD-10-CM

## 2020-06-23 DIAGNOSIS — E66.09 CLASS 2 OBESITY DUE TO EXCESS CALORIES WITHOUT SERIOUS COMORBIDITY WITH BODY MASS INDEX (BMI) OF 37.0 TO 37.9 IN ADULT: ICD-10-CM

## 2020-06-23 DIAGNOSIS — Z36.9 FIRST TRIMESTER SCREENING: ICD-10-CM

## 2020-06-23 DIAGNOSIS — D57.3 SICKLE CELL TRAIT IN MOTHER AFFECTING PREGNANCY (HCC): ICD-10-CM

## 2020-06-23 PROCEDURE — 76813 OB US NUCHAL MEAS 1 GEST: CPT | Performed by: OBSTETRICS & GYNECOLOGY

## 2020-06-23 PROCEDURE — 99203 OFFICE O/P NEW LOW 30 MIN: CPT | Performed by: OBSTETRICS & GYNECOLOGY

## 2020-06-23 NOTE — PROGRESS NOTES
Pt for 1st tri screen  Denies pregnancy complaints  ntd lab card lot # [id number]  FOB pos HIV tx at  [facility] center in [address] states his viral load undetectable

## 2020-06-23 NOTE — TELEPHONE ENCOUNTER
----- Message from [facility] sent at 6/23/2020 10:30 AM CDT -----  Regarding: [facility] PRIOR AUTHORIZATION  PATIENT SCHEDULED 8/4/20 FOR LEVEL 2 U/S AND WILL REQUIRE PRIOR AUTHORIZATION.     THANK YOU.    JAKE HARLEY  PSR AT [facility] MATERNAL FETAL MEDICINE  3

## 2020-06-23 NOTE — PROGRESS NOTES
Reason for Consult:   Dear Ms. Jazmin Nguyen,    Thank you for requesting ultrasound evaluation and maternal fetal medicine consultation on [address]. As you are aware she is a 31year old female with a Wells pregnancy at 13w5d.   A maternal- • Diabetes Maternal Grandmother    • Heart Disorder Maternal Grandmother    • Hypertension Maternal Grandmother    • Cancer Paternal Grandmother         Ovarian/uterine      Social History    Tobacco Use      Smoking status: Never Smoker      Smokeless t alone had been performed in this group of patients. We discussed that sometimes the screen will return with a low risk for aneuploidy but the DANK-A or HCG may be very low.   If this occurs, her pregnancy will be followed more closely for potential thir diabetes mellitus is one of the two most common medical complications of the obese . The increased risk of type 2 diabetes is primarily related to an exaggerated increase in insulin resistance in the obese state.  It is reasonable to screen obese gra weight. The analysis found that overweight and obese pregnant women experienced significantly more stillbirths than normal weight women. Increase  testing and Level 2 Ultrasound is recommended.          OB ULTRASOUND REPORT   See imaging ta allowing me to participate in the care of your patient. Please do not hesitate to call with any questions or concerns. Total patient time was 40 minutes in evaluation, consultation, and coordination of care.   Greater than 50% of this time was spent in

## 2020-06-25 ENCOUNTER — TELEPHONE (OUTPATIENT)
Dept: PERINATAL CARE | Facility: HOSPITAL | Age: 30
End: 2020-06-25

## 2020-06-25 NOTE — TELEPHONE ENCOUNTER
Emild FTS results and Dr Shahnaz Yost  reviewed and signed off  Spoke with [facility]    and informed her that the probability for Trisomy 13,18  after screening is 1 in > 10,000 and for Trisomy 21 1 in > 10,000.   These results within range  Pt verbalized underst

## 2020-08-03 ENCOUNTER — TELEPHONE (OUTPATIENT)
Dept: PERINATAL CARE | Facility: HOSPITAL | Age: 30
End: 2020-08-03

## 2020-08-03 ENCOUNTER — TELEPHONE (OUTPATIENT)
Dept: OBGYN CLINIC | Facility: CLINIC | Age: 30
End: 2020-08-03

## 2020-08-03 NOTE — TELEPHONE ENCOUNTER
Per M pt has [employer] which is not accepted at [address] [place] [address]. Spoke with pt who states there was a mix up when she enrolled back in April. Pt has re-enrolled and will have [employer] effective in September.  Pt states she will pay out of pocket for U/S

## 2020-08-03 NOTE — TELEPHONE ENCOUNTER
Called patient to confirm her insurance coverage. Per Erickson Barton in registration patient is now covered by [employer]. I explained to patient we/hospital does not accept [employer].   Patient stated she wanted to keep her appointment and make payment arrangements

## 2020-08-05 ENCOUNTER — TELEPHONE (OUTPATIENT)
Dept: PERINATAL CARE | Facility: HOSPITAL | Age: 30
End: 2020-08-05

## 2020-08-05 NOTE — TELEPHONE ENCOUNTER
Pt states a message was left by M informing her that she will need to complete level 2 u/s w/ [facility]. Phone # to schedule given to pt. Next PN visit scheduled for 9/1 when pt has [facility] insurance.  Pt verbalized an understanding & agrees w/ plan

## 2020-08-26 ENCOUNTER — TELEPHONE (OUTPATIENT)
Dept: OBGYN CLINIC | Facility: CLINIC | Age: 30
End: 2020-08-26

## 2020-08-26 PROBLEM — Z82.79 FAMILY HISTORY OF CONGENITAL ANOMALY: Status: ACTIVE | Noted: 2020-08-26

## 2020-09-01 ENCOUNTER — ROUTINE PRENATAL (OUTPATIENT)
Dept: OBGYN CLINIC | Facility: CLINIC | Age: 30
End: 2020-09-01
Payer: MEDICAID

## 2020-09-01 VITALS
WEIGHT: 228.13 LBS | HEART RATE: 99 BPM | BODY MASS INDEX: 38 KG/M2 | SYSTOLIC BLOOD PRESSURE: 110 MMHG | DIASTOLIC BLOOD PRESSURE: 70 MMHG

## 2020-09-01 DIAGNOSIS — Z20.6: ICD-10-CM

## 2020-09-01 DIAGNOSIS — Z34.82 PRENATAL CARE, SUBSEQUENT PREGNANCY, SECOND TRIMESTER: Primary | ICD-10-CM

## 2020-09-01 PROBLEM — N91.1 SECONDARY AMENORRHEA: Status: RESOLVED | Noted: 2019-01-03 | Resolved: 2020-09-01

## 2020-09-01 PROBLEM — Z82.79 FAMILY HISTORY OF CONGENITAL ANOMALY: Status: RESOLVED | Noted: 2020-08-26 | Resolved: 2020-09-01

## 2020-09-01 LAB
APPEARANCE: CLEAR
MULTISTIX LOT#: 1044 NUMERIC
PH, URINE: 7 (ref 4.5–8)
SPECIFIC GRAVITY: 1.01 (ref 1–1.03)
URINE-COLOR: YELLOW
UROBILINOGEN,SEMI-QN: 0.2 MG/DL (ref 0–1.9)

## 2020-09-01 PROCEDURE — 0502F SUBSEQUENT PRENATAL CARE: CPT | Performed by: ADVANCED PRACTICE MIDWIFE

## 2020-09-01 PROCEDURE — 3074F SYST BP LT 130 MM HG: CPT | Performed by: ADVANCED PRACTICE MIDWIFE

## 2020-09-01 PROCEDURE — 3078F DIAST BP <80 MM HG: CPT | Performed by: ADVANCED PRACTICE MIDWIFE

## 2020-09-01 PROCEDURE — 81002 URINALYSIS NONAUTO W/O SCOPE: CPT | Performed by: ADVANCED PRACTICE MIDWIFE

## 2020-09-01 NOTE — PATIENT INSTRUCTIONS
Understanding  Labor  Going into labor before your 37th week of pregnancy is called  labor.  labor can cause your baby to be born too soon. This can lead to a number of health problems that may affect your baby.       Before labor, th complete bed rest. You may have an IV (intravenous) line to get fluids. You may be given pills or injections to help prevent contractions. Finally, you may get medicine (corticosteroids) that helps your baby’s lungs mature more quickly. Are you at risk? These include:  · History of premature labor with other pregnancies  · Smoking  · Alcohol or substance abuse  · Low pre-pregnancy weight or weight gain during pregnancy  · Short time period between pregnancies  · Being pregnant with twins, triplets, or mor as directed. Weekly visits with your provider may be needed.   When to seek medical advice  Call your healthcare provider right away if any of these occur:  · Regular or frequent contractions, whether they are painful or not  · Pressure in the pelvis  · Pre take a daily low dose of aspirin if you are at risk for preeclampsia. Preeclampsia almost always ends soon after you give birth. Until then, your healthcare provider can help manage your condition.  If your symptoms are mild, you may need activity limits at put the baby at risk for health problems (fetal distress) and premature birth.  Preeclampsia can also cause these health problems:  · Kidney failure or other organ damage  · Seizures  · Stroke  Once you give birth  In most cases, preeclampsia goes away on i write down the time. · Count each movement until the baby has moved 10 times. This can take from 20 minutes to 2 hours. · If you have not felt 10 kicks by the end of the second hour, wait a few hours. Then try again.   · Try to do it at the same time ea

## 2020-09-01 NOTE — PROGRESS NOTES
Eleno Hamlin is here for her Nallely Zamorano 7902 visit. Currently, she is feeling well. Denies 2nd trimester danger signs. Patient clarified that neither she nor her partner have a family H/O cleft lip or autism.  She states that the family history is on her partn

## 2020-09-22 ENCOUNTER — APPOINTMENT (OUTPATIENT)
Dept: OTHER | Facility: HOSPITAL | Age: 30
End: 2020-09-22
Attending: PHYSICIAN ASSISTANT

## 2020-09-24 ENCOUNTER — APPOINTMENT (OUTPATIENT)
Dept: LAB | Facility: HOSPITAL | Age: 30
End: 2020-09-24
Attending: ADVANCED PRACTICE MIDWIFE

## 2020-09-24 ENCOUNTER — OFFICE VISIT (OUTPATIENT)
Dept: OTHER | Facility: HOSPITAL | Age: 30
End: 2020-09-24
Attending: ORTHOPAEDIC SURGERY

## 2020-09-24 DIAGNOSIS — Z00.00 ROUTINE GENERAL MEDICAL EXAMINATION AT A HEALTH CARE FACILITY: Primary | ICD-10-CM

## 2020-09-24 LAB
HBV SURFACE AB SER QL: REACTIVE
HBV SURFACE AB SERPL IA-ACNC: 292.03 MIU/ML
RUBV IGG SER QL: POSITIVE
RUBV IGG SER-ACNC: 274.5 IU/ML (ref 10–?)

## 2020-09-24 PROCEDURE — 86787 VARICELLA-ZOSTER ANTIBODY: CPT

## 2020-09-24 PROCEDURE — 86765 RUBEOLA ANTIBODY: CPT

## 2020-09-24 PROCEDURE — 86735 MUMPS ANTIBODY: CPT

## 2020-09-24 PROCEDURE — 86762 RUBELLA ANTIBODY: CPT

## 2020-09-24 PROCEDURE — 86706 HEP B SURFACE ANTIBODY: CPT

## 2020-09-25 LAB
MEV IGG SER-ACNC: >300 AU/ML (ref 16.5–?)
MUV IGG SER IA-ACNC: 113 AU/ML (ref 11–?)
VZV IGG SER IA-ACNC: >4000 (ref 165–?)

## 2020-09-29 ENCOUNTER — APPOINTMENT (OUTPATIENT)
Dept: OTHER | Facility: HOSPITAL | Age: 30
End: 2020-09-29
Attending: PHYSICIAN ASSISTANT

## 2020-10-01 ENCOUNTER — APPOINTMENT (OUTPATIENT)
Dept: OTHER | Facility: HOSPITAL | Age: 30
End: 2020-10-01
Attending: ORTHOPAEDIC SURGERY

## 2020-10-08 ENCOUNTER — LAB ENCOUNTER (OUTPATIENT)
Dept: LAB | Facility: HOSPITAL | Age: 30
End: 2020-10-08
Attending: ADVANCED PRACTICE MIDWIFE
Payer: MEDICAID

## 2020-10-08 DIAGNOSIS — Z34.82 PRENATAL CARE, SUBSEQUENT PREGNANCY, SECOND TRIMESTER: ICD-10-CM

## 2020-10-08 PROCEDURE — 86780 TREPONEMA PALLIDUM: CPT

## 2020-10-08 PROCEDURE — 82950 GLUCOSE TEST: CPT

## 2020-10-08 PROCEDURE — 36415 COLL VENOUS BLD VENIPUNCTURE: CPT

## 2020-10-08 PROCEDURE — 87389 HIV-1 AG W/HIV-1&-2 AB AG IA: CPT

## 2020-10-08 PROCEDURE — 85027 COMPLETE CBC AUTOMATED: CPT

## 2020-11-02 ENCOUNTER — ROUTINE PRENATAL (OUTPATIENT)
Dept: OBGYN CLINIC | Facility: CLINIC | Age: 30
End: 2020-11-02
Payer: MEDICAID

## 2020-11-02 VITALS
BODY MASS INDEX: 39 KG/M2 | HEART RATE: 91 BPM | SYSTOLIC BLOOD PRESSURE: 116 MMHG | WEIGHT: 232 LBS | DIASTOLIC BLOOD PRESSURE: 73 MMHG

## 2020-11-02 DIAGNOSIS — Z34.83 PRENATAL CARE, SUBSEQUENT PREGNANCY, THIRD TRIMESTER: Primary | ICD-10-CM

## 2020-11-02 PROCEDURE — 90715 TDAP VACCINE 7 YRS/> IM: CPT | Performed by: ADVANCED PRACTICE MIDWIFE

## 2020-11-02 PROCEDURE — 81002 URINALYSIS NONAUTO W/O SCOPE: CPT | Performed by: ADVANCED PRACTICE MIDWIFE

## 2020-11-02 PROCEDURE — 90471 IMMUNIZATION ADMIN: CPT | Performed by: ADVANCED PRACTICE MIDWIFE

## 2020-11-02 PROCEDURE — 3078F DIAST BP <80 MM HG: CPT | Performed by: ADVANCED PRACTICE MIDWIFE

## 2020-11-02 PROCEDURE — 3074F SYST BP LT 130 MM HG: CPT | Performed by: ADVANCED PRACTICE MIDWIFE

## 2020-11-02 PROCEDURE — 0502F SUBSEQUENT PRENATAL CARE: CPT | Performed by: ADVANCED PRACTICE MIDWIFE

## 2020-11-02 NOTE — PROGRESS NOTES
Its another girl. TDAP today. Reviewed danger signs. [facility].  In school for NP.

## 2020-11-09 NOTE — TELEPHONE ENCOUNTER
Rolando was notified of these recommendations and verbalizes understanding. She was encouraged to call back either way with an update mid week.    req a breast pump/ has [employer] [facility] and spoke w Kasey Gutierrez about this about last appt. insurance will cover # [phone] pt req for a nurse call back.

## 2020-11-12 ENCOUNTER — ROUTINE PRENATAL (OUTPATIENT)
Dept: OBGYN CLINIC | Facility: CLINIC | Age: 30
End: 2020-11-12
Payer: MEDICAID

## 2020-11-12 VITALS
HEART RATE: 103 BPM | WEIGHT: 236 LBS | SYSTOLIC BLOOD PRESSURE: 111 MMHG | BODY MASS INDEX: 39 KG/M2 | DIASTOLIC BLOOD PRESSURE: 70 MMHG

## 2020-11-12 DIAGNOSIS — Z34.83 ENCOUNTER FOR SUPERVISION OF OTHER NORMAL PREGNANCY IN THIRD TRIMESTER: Primary | ICD-10-CM

## 2020-11-12 PROCEDURE — 3078F DIAST BP <80 MM HG: CPT | Performed by: ADVANCED PRACTICE MIDWIFE

## 2020-11-12 PROCEDURE — 0502F SUBSEQUENT PRENATAL CARE: CPT | Performed by: ADVANCED PRACTICE MIDWIFE

## 2020-11-12 PROCEDURE — 81002 URINALYSIS NONAUTO W/O SCOPE: CPT | Performed by: ADVANCED PRACTICE MIDWIFE

## 2020-11-12 PROCEDURE — 3074F SYST BP LT 130 MM HG: CPT | Performed by: ADVANCED PRACTICE MIDWIFE

## 2020-11-27 ENCOUNTER — ROUTINE PRENATAL (OUTPATIENT)
Dept: OBGYN CLINIC | Facility: CLINIC | Age: 30
End: 2020-11-27
Payer: MEDICAID

## 2020-11-27 VITALS
DIASTOLIC BLOOD PRESSURE: 79 MMHG | HEART RATE: 94 BPM | WEIGHT: 234.38 LBS | SYSTOLIC BLOOD PRESSURE: 116 MMHG | BODY MASS INDEX: 39 KG/M2

## 2020-11-27 DIAGNOSIS — Z34.83 PRENATAL CARE, SUBSEQUENT PREGNANCY, THIRD TRIMESTER: Primary | ICD-10-CM

## 2020-11-27 PROCEDURE — 81002 URINALYSIS NONAUTO W/O SCOPE: CPT | Performed by: ADVANCED PRACTICE MIDWIFE

## 2020-11-27 PROCEDURE — 0502F SUBSEQUENT PRENATAL CARE: CPT | Performed by: ADVANCED PRACTICE MIDWIFE

## 2020-11-27 PROCEDURE — 3074F SYST BP LT 130 MM HG: CPT | Performed by: ADVANCED PRACTICE MIDWIFE

## 2020-11-27 PROCEDURE — 3078F DIAST BP <80 MM HG: CPT | Performed by: ADVANCED PRACTICE MIDWIFE

## 2020-12-01 PROBLEM — B95.1 GROUP B STREPTOCOCCAL INFECTION DURING PREGNANCY: Status: ACTIVE | Noted: 2020-12-01

## 2020-12-01 PROBLEM — O98.819 GROUP B STREPTOCOCCAL INFECTION DURING PREGNANCY: Status: ACTIVE | Noted: 2020-12-01

## 2020-12-01 PROBLEM — O98.819 GROUP B STREPTOCOCCAL INFECTION DURING PREGNANCY (HCC): Status: ACTIVE | Noted: 2020-12-01

## 2020-12-01 PROBLEM — B95.1 GROUP B STREPTOCOCCAL INFECTION DURING PREGNANCY (HCC): Status: ACTIVE | Noted: 2020-12-01

## 2020-12-03 ENCOUNTER — ROUTINE PRENATAL (OUTPATIENT)
Dept: OBGYN CLINIC | Facility: CLINIC | Age: 30
End: 2020-12-03
Payer: MEDICAID

## 2020-12-03 VITALS
HEART RATE: 106 BPM | DIASTOLIC BLOOD PRESSURE: 67 MMHG | WEIGHT: 235.38 LBS | SYSTOLIC BLOOD PRESSURE: 105 MMHG | BODY MASS INDEX: 39 KG/M2

## 2020-12-03 DIAGNOSIS — Z34.83 ENCOUNTER FOR SUPERVISION OF OTHER NORMAL PREGNANCY IN THIRD TRIMESTER: Primary | ICD-10-CM

## 2020-12-03 PROCEDURE — 0502F SUBSEQUENT PRENATAL CARE: CPT | Performed by: ADVANCED PRACTICE MIDWIFE

## 2020-12-03 PROCEDURE — 3078F DIAST BP <80 MM HG: CPT | Performed by: ADVANCED PRACTICE MIDWIFE

## 2020-12-03 PROCEDURE — 81002 URINALYSIS NONAUTO W/O SCOPE: CPT | Performed by: ADVANCED PRACTICE MIDWIFE

## 2020-12-03 PROCEDURE — 3074F SYST BP LT 130 MM HG: CPT | Performed by: ADVANCED PRACTICE MIDWIFE

## 2020-12-09 ENCOUNTER — ROUTINE PRENATAL (OUTPATIENT)
Dept: OBGYN CLINIC | Facility: CLINIC | Age: 30
End: 2020-12-09
Payer: MEDICAID

## 2020-12-09 ENCOUNTER — TELEPHONE (OUTPATIENT)
Dept: OBGYN CLINIC | Facility: CLINIC | Age: 30
End: 2020-12-09

## 2020-12-09 VITALS
HEART RATE: 111 BPM | BODY MASS INDEX: 39 KG/M2 | DIASTOLIC BLOOD PRESSURE: 65 MMHG | SYSTOLIC BLOOD PRESSURE: 101 MMHG | WEIGHT: 235 LBS

## 2020-12-09 DIAGNOSIS — Z34.83 ENCOUNTER FOR SUPERVISION OF OTHER NORMAL PREGNANCY, THIRD TRIMESTER: Primary | ICD-10-CM

## 2020-12-09 PROBLEM — Z34.93 PRENATAL CARE, THIRD TRIMESTER: Status: ACTIVE | Noted: 2020-12-09

## 2020-12-09 PROBLEM — Z34.93 PRENATAL CARE, THIRD TRIMESTER (HCC): Status: ACTIVE | Noted: 2020-12-09

## 2020-12-09 PROCEDURE — 3074F SYST BP LT 130 MM HG: CPT | Performed by: ADVANCED PRACTICE MIDWIFE

## 2020-12-09 PROCEDURE — 0502F SUBSEQUENT PRENATAL CARE: CPT | Performed by: ADVANCED PRACTICE MIDWIFE

## 2020-12-09 PROCEDURE — 3078F DIAST BP <80 MM HG: CPT | Performed by: ADVANCED PRACTICE MIDWIFE

## 2020-12-09 PROCEDURE — 81002 URINALYSIS NONAUTO W/O SCOPE: CPT | Performed by: ADVANCED PRACTICE MIDWIFE

## 2020-12-09 RX ORDER — BREAST PUMP
EACH MISCELLANEOUS
Qty: 1 EACH | Refills: 0 | Status: SHIPPED | OUTPATIENT
Start: 2020-12-09 | End: 2021-06-21

## 2020-12-09 NOTE — PROGRESS NOTES
Active fetus Increasing BH contractions. Denies any leaking of fluid or bleeding. Reviewed S&S labor  Warning signs reviewed  All questions answered.     Birth plan reviewed:    Support: FOB   Pain management: Natural  Vitamin K: Yes  Erythromycin ointment

## 2020-12-09 NOTE — TELEPHONE ENCOUNTER
Pt desires order for breast pump. Pt advised breast pump order will be faxed to [facility] and they will contact her to notify which breast pumps are covered by her insurance. Pt agreed and voiced understanding.

## 2020-12-09 NOTE — PATIENT INSTRUCTIONS
Understanding Preeclampsia  Preeclampsia is high blood pressure (hypertension) that happens during pregnancy. It often shows up around the 20th week of pregnancy. It often goes back to normal by the 12th week after you give birth.  It can lead to serious Call your healthcare provider if swelling, weight gain, or other symptoms come on quickly or are severe. Some cases of preeclampsia are more severe than others. Your symptoms also may change or get worse as you get closer to your due date. Who’s at risk? Postpartum preeclampsia that develops within the first 48 hours after delivery is rare. Another type of postpartum preeclampsia that develops more than 48 hours after delivery is called late-onset preeclampsia. It is also rare.  Contact your healthcare prov · You have not felt your baby move all day. Renate last reviewed this educational content on 12/1/2017  © 7902-5332 [employer]. [address] [facility], [address] [place] [address]. All rights reserved.  This information is not intended as a substitu Renate last reviewed this educational content on 10/1/2017  © 7443-7318 [employer]. 1407 [facility] [place], [facility] [place]. All rights reserved. This information is not intended as a substitute for professional medical care.  Always foll The third stage of labor comes after your baby is born. This is when the afterbirth (placenta) comes out of your uterus. Your uterus will continue to contract. But the contractions are much milder than before.   Renate last reviewed this educational walker

## 2020-12-17 ENCOUNTER — ROUTINE PRENATAL (OUTPATIENT)
Dept: OBGYN CLINIC | Facility: CLINIC | Age: 30
End: 2020-12-17
Payer: MEDICAID

## 2020-12-17 VITALS
BODY MASS INDEX: 40 KG/M2 | WEIGHT: 237.38 LBS | DIASTOLIC BLOOD PRESSURE: 75 MMHG | SYSTOLIC BLOOD PRESSURE: 114 MMHG | HEART RATE: 101 BPM

## 2020-12-17 DIAGNOSIS — Z34.83 ENCOUNTER FOR SUPERVISION OF OTHER NORMAL PREGNANCY IN THIRD TRIMESTER: Primary | ICD-10-CM

## 2020-12-17 PROCEDURE — 3074F SYST BP LT 130 MM HG: CPT | Performed by: ADVANCED PRACTICE MIDWIFE

## 2020-12-17 PROCEDURE — 0502F SUBSEQUENT PRENATAL CARE: CPT | Performed by: ADVANCED PRACTICE MIDWIFE

## 2020-12-17 PROCEDURE — 81002 URINALYSIS NONAUTO W/O SCOPE: CPT | Performed by: ADVANCED PRACTICE MIDWIFE

## 2020-12-17 PROCEDURE — 3078F DIAST BP <80 MM HG: CPT | Performed by: ADVANCED PRACTICE MIDWIFE

## 2020-12-17 NOTE — PATIENT INSTRUCTIONS
Understanding Preeclampsia  Preeclampsia is high blood pressure (hypertension) that happens during pregnancy. It often shows up around the 20th week of pregnancy. It often goes back to normal by the 12th week after you give birth.  It can lead to serious Call your healthcare provider if swelling, weight gain, or other symptoms come on quickly or are severe. Some cases of preeclampsia are more severe than others. Your symptoms also may change or get worse as you get closer to your due date. Who’s at risk? Postpartum preeclampsia that develops within the first 48 hours after delivery is rare. Another type of postpartum preeclampsia that develops more than 48 hours after delivery is called late-onset preeclampsia. It is also rare.  Contact your healthcare prov · You have not felt your baby move all day. Renate last reviewed this educational content on 12/1/2017  © 5516-2516 [employer]. 1407 [facility], [address] [place] [address]. All rights reserved.  This information is not intended as a substitu Renate last reviewed this educational content on 10/1/2017  © 4564-5651 [employer]. 1407 [facility] [place], [facility] [place]. All rights reserved. This information is not intended as a substitute for professional medical care.  Always foll The third stage of labor comes after your baby is born. This is when the afterbirth (placenta) comes out of your uterus. Your uterus will continue to contract. But the contractions are much milder than before.   Renate last reviewed this educational walker

## 2020-12-17 NOTE — PROGRESS NOTES
Joann Wakefield is here for her JAKE visit. Currently, she is feeling well. Denies 3rd trimester danger signs. Does not want a vaginal exam today. Has her last final for the semester tomorrow and does not want to stir anything up.     Vital signs and weight review

## 2020-12-22 ENCOUNTER — HOSPITAL ENCOUNTER (INPATIENT)
Facility: HOSPITAL | Age: 30
LOS: 2 days | Discharge: HOME | End: 2020-12-24
Attending: ADVANCED PRACTICE MIDWIFE | Admitting: OBSTETRICS & GYNECOLOGY
Payer: MEDICAID

## 2020-12-22 ENCOUNTER — TELEPHONE (OUTPATIENT)
Dept: OBGYN CLINIC | Facility: CLINIC | Age: 30
End: 2020-12-22

## 2020-12-22 PROBLEM — Z34.90 TERM PREGNANCY: Status: ACTIVE | Noted: 2020-12-22

## 2020-12-22 PROBLEM — Z34.90 TERM PREGNANCY (HCC): Status: ACTIVE | Noted: 2020-12-22

## 2020-12-22 PROCEDURE — 86901 BLOOD TYPING SEROLOGIC RH(D): CPT | Performed by: ADVANCED PRACTICE MIDWIFE

## 2020-12-22 PROCEDURE — 86701 HIV-1ANTIBODY: CPT | Performed by: ADVANCED PRACTICE MIDWIFE

## 2020-12-22 PROCEDURE — 86850 RBC ANTIBODY SCREEN: CPT | Performed by: ADVANCED PRACTICE MIDWIFE

## 2020-12-22 PROCEDURE — 85025 COMPLETE CBC W/AUTO DIFF WBC: CPT | Performed by: ADVANCED PRACTICE MIDWIFE

## 2020-12-22 PROCEDURE — 86900 BLOOD TYPING SEROLOGIC ABO: CPT | Performed by: ADVANCED PRACTICE MIDWIFE

## 2020-12-22 RX ORDER — DEXTROSE, SODIUM CHLORIDE, SODIUM LACTATE, POTASSIUM CHLORIDE, AND CALCIUM CHLORIDE 5; .6; .31; .03; .02 G/100ML; G/100ML; G/100ML; G/100ML; G/100ML
INJECTION, SOLUTION INTRAVENOUS CONTINUOUS
Status: DISCONTINUED | OUTPATIENT
Start: 2020-12-22 | End: 2020-12-22 | Stop reason: HOSPADM

## 2020-12-22 RX ORDER — ONDANSETRON 2 MG/ML
4 INJECTION INTRAMUSCULAR; INTRAVENOUS EVERY 6 HOURS PRN
Status: DISCONTINUED | OUTPATIENT
Start: 2020-12-22 | End: 2020-12-22 | Stop reason: HOSPADM

## 2020-12-22 RX ORDER — BISACODYL 10 MG
10 SUPPOSITORY, RECTAL RECTAL ONCE AS NEEDED
Status: DISCONTINUED | OUTPATIENT
Start: 2020-12-22 | End: 2020-12-24

## 2020-12-22 RX ORDER — AMMONIA INHALANTS 0.04 G/.3ML
0.3 INHALANT RESPIRATORY (INHALATION) AS NEEDED
Status: DISCONTINUED | OUTPATIENT
Start: 2020-12-22 | End: 2020-12-22

## 2020-12-22 RX ORDER — TERBUTALINE SULFATE 1 MG/ML
0.25 INJECTION, SOLUTION SUBCUTANEOUS AS NEEDED
Status: DISCONTINUED | OUTPATIENT
Start: 2020-12-22 | End: 2020-12-22 | Stop reason: HOSPADM

## 2020-12-22 RX ORDER — LIDOCAINE HYDROCHLORIDE 10 MG/ML
30 INJECTION, SOLUTION EPIDURAL; INFILTRATION; INTRACAUDAL; PERINEURAL ONCE
Status: DISCONTINUED | OUTPATIENT
Start: 2020-12-22 | End: 2020-12-22 | Stop reason: HOSPADM

## 2020-12-22 RX ORDER — SODIUM CHLORIDE, SODIUM LACTATE, POTASSIUM CHLORIDE, CALCIUM CHLORIDE 600; 310; 30; 20 MG/100ML; MG/100ML; MG/100ML; MG/100ML
INJECTION, SOLUTION INTRAVENOUS CONTINUOUS
Status: DISCONTINUED | OUTPATIENT
Start: 2020-12-22 | End: 2020-12-22 | Stop reason: HOSPADM

## 2020-12-22 RX ORDER — ACETAMINOPHEN 500 MG
500 TABLET ORAL EVERY 6 HOURS PRN
Status: DISCONTINUED | OUTPATIENT
Start: 2020-12-22 | End: 2020-12-22

## 2020-12-22 RX ORDER — DIAPER,BRIEF,INFANT-TODD,DISP
1 EACH MISCELLANEOUS EVERY 6 HOURS PRN
Status: DISCONTINUED | OUTPATIENT
Start: 2020-12-22 | End: 2020-12-24

## 2020-12-22 RX ORDER — AMMONIA INHALANTS 0.04 G/.3ML
0.3 INHALANT RESPIRATORY (INHALATION) AS NEEDED
Status: DISCONTINUED | OUTPATIENT
Start: 2020-12-22 | End: 2020-12-24

## 2020-12-22 RX ORDER — ACETAMINOPHEN 325 MG/1
650 TABLET ORAL EVERY 6 HOURS PRN
Status: DISCONTINUED | OUTPATIENT
Start: 2020-12-22 | End: 2020-12-24

## 2020-12-22 RX ORDER — IBUPROFEN 600 MG/1
600 TABLET ORAL EVERY 6 HOURS PRN
Status: DISCONTINUED | OUTPATIENT
Start: 2020-12-22 | End: 2020-12-22

## 2020-12-22 RX ORDER — SIMETHICONE 80 MG
80 TABLET,CHEWABLE ORAL 3 TIMES DAILY PRN
Status: DISCONTINUED | OUTPATIENT
Start: 2020-12-22 | End: 2020-12-24

## 2020-12-22 RX ORDER — DOCUSATE SODIUM 100 MG/1
100 CAPSULE, LIQUID FILLED ORAL
Status: DISCONTINUED | OUTPATIENT
Start: 2020-12-22 | End: 2020-12-24

## 2020-12-22 RX ORDER — IBUPROFEN 600 MG/1
600 TABLET ORAL EVERY 6 HOURS
Status: DISCONTINUED | OUTPATIENT
Start: 2020-12-22 | End: 2020-12-24

## 2020-12-22 RX ORDER — TRISODIUM CITRATE DIHYDRATE AND CITRIC ACID MONOHYDRATE 500; 334 MG/5ML; MG/5ML
30 SOLUTION ORAL AS NEEDED
Status: DISCONTINUED | OUTPATIENT
Start: 2020-12-22 | End: 2020-12-22 | Stop reason: HOSPADM

## 2020-12-22 NOTE — H&P
[address] Patient Status:  Inpatient    3/13/1990 MRN [id number]   Location [address] [place] [address] Attending [address] Day # 0 Nadeem Galdamez MD     Date o Alcohol use: Not Currently      Alcohol/week: 1.0 standard drinks      Types: 1 Standard drinks or equivalent per week      Frequency: 2-4 times a month      Comment: prior to pregnancy socially       Allergies/Medications:    Allergies:     Nickel Blood Type (ABO Group) O   05/11/20 1617    Rh Factor Positive   05/11/20 1617    Antibody Screen (Required questions in OE to answer) Negative   05/11/20 1617    HCT 39.8 % 35.0 - 48.0 05/11/20 1617    HGB 13.3 g/dL 12.0 - 16.0 05/11/20 1617    MCV 82.9 Glucose 1 Hr        Glucose 2 Hr        Glucose 3 Hr        TSH         Profile        Urine Culture        GC DNA        Chlamydia DNA              35-37 Weeks     Test Value Reference Range Date Time    HCT        HGB        Platelets        TR All questions answered, all appropriate consents will be signed at the Hospital. Admission is planned for today. Expectant management. Davie Doty  12/22/2020  5:51 PM

## 2020-12-22 NOTE — PROGRESS NOTES
Pt is a 31year old female admitted to [address] [place] [address]. Patient presents with:  Laboring     Pt is [id number] 39w5d intra-uterine pregnancy. History obtained, consents signed. Oriented to room, staff, and plan of care.

## 2020-12-22 NOTE — TELEPHONE ENCOUNTER
Reports ctx starting at 212p. Became 3mins apart at 351p & \"getting stronger. \" difficult to walk or talk through ctx. GBS +. Desires waterbirth. desires to come to [facility] now. Will be there in 30 mins. MES notified & heading to [facility].  SUSANA torres

## 2020-12-23 PROCEDURE — 85018 HEMOGLOBIN: CPT | Performed by: ADVANCED PRACTICE MIDWIFE

## 2020-12-23 PROCEDURE — 85014 HEMATOCRIT: CPT | Performed by: ADVANCED PRACTICE MIDWIFE

## 2020-12-23 NOTE — L&D DELIVERY NOTE
Ju Sanford, Girl [[id number]]    Labor Events     labor?: No   steroids?: None  Antibiotics received during labor?: Yes  Antibiotics (enter # doses in comment): ampicillin  Rupture date/time: 2020 184     Rupture type: AROM  Fluid color weight on file.      Presentation Vertex [1]  Position Right [3] Occiput [1] Anterior [1]    Apgars:  1 minute:  8               5 minutes:   9                       10 minutes:      Placenta  Date/Time of Delivery: 12/22/2020  7:02 PM   Delivery: [facility]

## 2020-12-23 NOTE — PROGRESS NOTES
Pt transferred to room 358 in stable condition. Received report from [facility], L&D RN. VSS, afebrile. Fundus is firm U/1, bleeding is small. Plan of care and paperwork reviewed with pt. No questions at this time. Will continue with plan of care.

## 2020-12-23 NOTE — LACTATION NOTE
This note was copied from a baby's chart.   LACTATION NOTE - INFANT    Evaluation Type  Evaluation Type: Inpatient    Problems & Assessment  Infant Assessment: Minimal hunger cues present  Muscle tone: Appropriate for GA    Feeding Assessment  Summary Joel Ca

## 2020-12-23 NOTE — PROGRESS NOTES
[place] [facility]    OB/GYNE Progress Note      Ofeliaestrada Choe Patient Status:  Inpatient    3/13/1990 MRN [id number]   Location [facility] 3SE Attending Cynthia Enriquez, [address] Day # 1 PCP Pepper Ralph MD        Assessment/Plan RAPIDHIVSCRN Nonreactive 12/22/2020    HBVSAG Nonreactive  12/29/2018    ABO O 12/22/2020    RH Positive 12/22/2020    WBC 12.2 (H) 12/22/2020    HGB 11.4 (L) 12/23/2020    HCT 34.4 (L) 12/23/2020    .0 12/22/2020    CREATSERUM 0.75 01/15/2020

## 2020-12-23 NOTE — LACTATION NOTE
LACTATION NOTE - MOTHER      Evaluation Type: Inpatient    Problems identified  Problems identified: Knowledge deficit    Maternal history  Maternal history: Obesity  Other/comment: FOB HIV positive, sickle cell trait    Breastfeeding goal  Breastfeeding g

## 2020-12-23 NOTE — PROGRESS NOTES
Patient up to bathroom with assist x 2. Voided 650 cc. Patient transferred to mother/baby room 358 per wheelchair in stable condition with baby and personal belongings. Accompanied by significant other and staff. Report given to Katharina mother/baby RN.

## 2020-12-23 NOTE — PLAN OF CARE
Problem: POSTPARTUM  Goal: Long Term Goal:Experiences normal postpartum course  Description: INTERVENTIONS:  - Assess and monitor vital signs and lab values. - Assess fundus and lochia. - Provide ice/sitz baths for perineum discomfort.   - Monitor heali Assess and monitor for signs of nipple pain/trauma. - Instruct and provide assistance with proper latch. - Review techniques for milk expression (breast pumping) and storage of breast milk.  Provide pumping equipment/supplies, instructions and assistance,

## 2020-12-24 VITALS
HEART RATE: 86 BPM | RESPIRATION RATE: 18 BRPM | DIASTOLIC BLOOD PRESSURE: 80 MMHG | SYSTOLIC BLOOD PRESSURE: 116 MMHG | TEMPERATURE: 98 F

## 2020-12-24 PROBLEM — Z34.90 TERM PREGNANCY (HCC): Status: RESOLVED | Noted: 2020-12-22 | Resolved: 2020-12-24

## 2020-12-24 PROBLEM — Z34.90 TERM PREGNANCY: Status: RESOLVED | Noted: 2020-12-22 | Resolved: 2020-12-24

## 2020-12-24 RX ORDER — IBUPROFEN 600 MG/1
600 TABLET ORAL EVERY 6 HOURS PRN
Qty: 40 TABLET | Refills: 0 | Status: SHIPPED | OUTPATIENT
Start: 2020-12-24 | End: 2021-06-21

## 2020-12-24 NOTE — PROGRESS NOTES
[facility] [facility]    OB/GYNE Progress Note      Kenisha Lynn Patient Status:  Inpatient    3/13/1990 MRN [id number]   Location [facility] 3SE Attending Phil White, [address] Day # 2 PCP Ahsan Baig MD        Assessment/Plan 12/23/2020    HCT 34.4 (L) 12/23/2020    .0 12/22/2020    CREATSERUM 0.75 01/15/2020    BUN 9 01/15/2020     01/15/2020    K 4.2 01/15/2020     01/15/2020    CO2 28.0 01/15/2020    GLU 70 01/15/2020    CA 9.1 01/15/2020    ALB 3.9 12/29/

## 2020-12-24 NOTE — LACTATION NOTE
This note was copied from a baby's chart.   LACTATION NOTE - INFANT    Evaluation Type  Evaluation Type: Inpatient    Problems & Assessment  Infant Assessment: Minimal hunger cues present  Muscle tone: Appropriate for GA    Feeding Assessment  Summary Carolyn Arambula

## 2020-12-24 NOTE — DISCHARGE SUMMARY
[facility]    Discharge Summary    Chanel Jaffe Patient Status:  Inpatient    3/13/1990 MRN [id number]   Location [facility] 3SE Attending Maria G Mcginnis, [address] Day # 2       Delivering OB Clinician:  Jb Copeland and

## 2021-01-07 ENCOUNTER — TELEPHONE (OUTPATIENT)
Dept: OBGYN UNIT | Facility: HOSPITAL | Age: 31
End: 2021-01-07

## 2021-02-18 ENCOUNTER — POSTPARTUM (OUTPATIENT)
Dept: OBGYN CLINIC | Facility: CLINIC | Age: 31
End: 2021-02-18
Payer: MEDICAID

## 2021-02-18 VITALS
SYSTOLIC BLOOD PRESSURE: 114 MMHG | DIASTOLIC BLOOD PRESSURE: 84 MMHG | WEIGHT: 218 LBS | BODY MASS INDEX: 36 KG/M2 | HEART RATE: 80 BPM

## 2021-02-18 DIAGNOSIS — Z30.09 FAMILY PLANNING: ICD-10-CM

## 2021-02-18 DIAGNOSIS — N89.8 VAGINAL DISCHARGE: ICD-10-CM

## 2021-02-18 PROBLEM — O20.9 BLEEDING IN EARLY PREGNANCY: Status: RESOLVED | Noted: 2020-05-13 | Resolved: 2021-02-18

## 2021-02-18 PROBLEM — Z34.93 PRENATAL CARE, THIRD TRIMESTER: Status: RESOLVED | Noted: 2020-12-09 | Resolved: 2021-02-18

## 2021-02-18 PROBLEM — O98.819 GROUP B STREPTOCOCCAL INFECTION DURING PREGNANCY: Status: RESOLVED | Noted: 2020-12-01 | Resolved: 2021-02-18

## 2021-02-18 PROBLEM — B95.1 GROUP B STREPTOCOCCAL INFECTION DURING PREGNANCY: Status: RESOLVED | Noted: 2020-12-01 | Resolved: 2021-02-18

## 2021-02-18 PROBLEM — O98.819 GROUP B STREPTOCOCCAL INFECTION DURING PREGNANCY (HCC): Status: RESOLVED | Noted: 2020-12-01 | Resolved: 2021-02-18

## 2021-02-18 PROBLEM — B95.1 GROUP B STREPTOCOCCAL INFECTION DURING PREGNANCY (HCC): Status: RESOLVED | Noted: 2020-12-01 | Resolved: 2021-02-18

## 2021-02-18 PROBLEM — Z34.93 PRENATAL CARE, THIRD TRIMESTER (HCC): Status: RESOLVED | Noted: 2020-12-09 | Resolved: 2021-02-18

## 2021-02-18 PROBLEM — O20.9 BLEEDING IN EARLY PREGNANCY (HCC): Status: RESOLVED | Noted: 2020-05-13 | Resolved: 2021-02-18

## 2021-02-18 PROCEDURE — 3079F DIAST BP 80-89 MM HG: CPT | Performed by: ADVANCED PRACTICE MIDWIFE

## 2021-02-18 PROCEDURE — 0503F POSTPARTUM CARE VISIT: CPT | Performed by: ADVANCED PRACTICE MIDWIFE

## 2021-02-18 PROCEDURE — 3074F SYST BP LT 130 MM HG: CPT | Performed by: ADVANCED PRACTICE MIDWIFE

## 2021-02-18 PROCEDURE — 81025 URINE PREGNANCY TEST: CPT | Performed by: ADVANCED PRACTICE MIDWIFE

## 2021-02-18 PROCEDURE — 96372 THER/PROPH/DIAG INJ SC/IM: CPT | Performed by: ADVANCED PRACTICE MIDWIFE

## 2021-02-18 RX ORDER — MEDROXYPROGESTERONE ACETATE 150 MG/ML
150 INJECTION, SUSPENSION INTRAMUSCULAR ONCE
Status: COMPLETED | OUTPATIENT
Start: 2021-02-18 | End: 2021-02-18

## 2021-02-18 RX ADMIN — MEDROXYPROGESTERONE ACETATE 150 MG: 150 INJECTION, SUSPENSION INTRAMUSCULAR at 13:28:00

## 2021-02-18 NOTE — PROGRESS NOTES
Patient here for postpartum check-up. Vaginal delivery @ 6 weeks ago with JADA Ewign. Breastfeeding stopped now. Baby with adequate weight gain. Denies fevers, chills, body aches and flu-like symptoms.   Denies abdominal pain, no pelvic pain, reports no vagin

## 2021-02-20 LAB
GENITAL VAGINOSIS SCREEN: POSITIVE
TRICHOMONAS SCREEN: NEGATIVE

## 2021-02-22 ENCOUNTER — TELEPHONE (OUTPATIENT)
Dept: OBGYN CLINIC | Facility: CLINIC | Age: 31
End: 2021-02-22

## 2021-02-22 RX ORDER — METRONIDAZOLE 500 MG/1
500 TABLET ORAL 2 TIMES DAILY
Qty: 14 TABLET | Refills: 0 | Status: SHIPPED | OUTPATIENT
Start: 2021-02-22 | End: 2021-03-01

## 2021-02-22 NOTE — TELEPHONE ENCOUNTER
----- Message from Noe Batres CNM sent at 2/22/2021 12:04 PM CST -----  Positive for BV. RX sent to pharmacy.  Please call

## 2021-06-14 ENCOUNTER — TELEPHONE (OUTPATIENT)
Dept: OBGYN CLINIC | Facility: CLINIC | Age: 31
End: 2021-06-14

## 2021-06-21 ENCOUNTER — OFFICE VISIT (OUTPATIENT)
Dept: OBGYN CLINIC | Facility: CLINIC | Age: 31
End: 2021-06-21
Payer: MEDICAID

## 2021-06-21 VITALS
DIASTOLIC BLOOD PRESSURE: 70 MMHG | HEART RATE: 99 BPM | SYSTOLIC BLOOD PRESSURE: 108 MMHG | WEIGHT: 235.13 LBS | BODY MASS INDEX: 39.17 KG/M2 | HEIGHT: 65 IN

## 2021-06-21 DIAGNOSIS — Z30.017 NEXPLANON INSERTION: ICD-10-CM

## 2021-06-21 DIAGNOSIS — Z30.017 INSERTION OF IMPLANTABLE SUBDERMAL CONTRACEPTIVE: Primary | ICD-10-CM

## 2021-06-21 PROCEDURE — 81025 URINE PREGNANCY TEST: CPT | Performed by: ADVANCED PRACTICE MIDWIFE

## 2021-06-21 PROCEDURE — 11981 INSERTION DRUG DLVR IMPLANT: CPT | Performed by: ADVANCED PRACTICE MIDWIFE

## 2021-06-21 PROCEDURE — 3078F DIAST BP <80 MM HG: CPT | Performed by: ADVANCED PRACTICE MIDWIFE

## 2021-06-21 PROCEDURE — 3074F SYST BP LT 130 MM HG: CPT | Performed by: ADVANCED PRACTICE MIDWIFE

## 2021-06-21 PROCEDURE — 3008F BODY MASS INDEX DOCD: CPT | Performed by: ADVANCED PRACTICE MIDWIFE

## 2021-06-21 NOTE — PROCEDURES
Nexplanon Insertion  Pregnancy Results: negative from urine test   Birth control method(s) used:  ; date last used:  depo ran out in may. No menses yet. Consent was obtained from the patient.   Insertion:  The patient was positioned with her left arm flex

## 2021-07-26 ENCOUNTER — HOSPITAL ENCOUNTER (EMERGENCY)
Facility: HOSPITAL | Age: 31
Discharge: HOME | End: 2021-07-26
Payer: MEDICAID

## 2021-07-26 ENCOUNTER — APPOINTMENT (OUTPATIENT)
Dept: GENERAL RADIOLOGY | Facility: HOSPITAL | Age: 31
End: 2021-07-26
Payer: MEDICAID

## 2021-07-26 VITALS
RESPIRATION RATE: 12 BRPM | BODY MASS INDEX: 39 KG/M2 | DIASTOLIC BLOOD PRESSURE: 77 MMHG | TEMPERATURE: 98 F | SYSTOLIC BLOOD PRESSURE: 126 MMHG | OXYGEN SATURATION: 99 % | HEART RATE: 77 BPM | WEIGHT: 235 LBS

## 2021-07-26 DIAGNOSIS — R07.89 CHEST WALL PAIN: Primary | ICD-10-CM

## 2021-07-26 PROCEDURE — 93005 ELECTROCARDIOGRAM TRACING: CPT

## 2021-07-26 PROCEDURE — 93010 ELECTROCARDIOGRAM REPORT: CPT | Performed by: INTERNAL MEDICINE

## 2021-07-26 PROCEDURE — 99283 EMERGENCY DEPT VISIT LOW MDM: CPT

## 2021-07-26 PROCEDURE — 71046 X-RAY EXAM CHEST 2 VIEWS: CPT

## 2021-07-26 NOTE — ED INITIAL ASSESSMENT (HPI)
Patient complains of chest pain when she went to  her baby, states she feels the pain is gone at this time, denies diff in breathing

## 2021-07-27 NOTE — ED QUICK NOTES
Pt provided with discharge instructions. Verbalized understanding for plan of care at home and follow up. All questions/concerns addressed prior to discharge.    Pt ambulatory out of ED with steady gait

## 2021-07-27 NOTE — ED PROVIDER NOTES
Patient Seen in: [facility] AND [facility] Emergency Department    History   Patient presents with:  Chest Pain Angina      HPI    The patient presents to the ED complaining of chest pain that occurred when she went to  her child from a crib this evening. Exposure: No        Hobby Hazards: No        Sleep Concern: No        Stress Concern: No        Weight Concern: No        Special Diet: No        Back Care: No        Exercise: No        Bike Helmet: No        Seat Belt: Yes        Self-Exams: No      ROS Musculoskeletal:         General: No deformity. Skin:     General: Skin is warm and dry. Neurological:      Mental Status: She is alert and oriented to person, place, and time.    Psychiatric:         Mood and Affect: Mood normal.         Behavior: Be and Plan     Clinical Impression:  Chest wall pain  (primary encounter diagnosis)    Disposition:  Discharge    Follow-up:  Martha Luque MD  [address] [place], [place] [id number] [phone]    Schedule an appointment as soon as possible for a visit
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
## 2021-09-17 ENCOUNTER — IMMUNIZATION (OUTPATIENT)
Dept: LAB | Facility: HOSPITAL | Age: 31
End: 2021-09-17
Attending: EMERGENCY MEDICINE
Payer: MEDICAID

## 2021-09-17 DIAGNOSIS — Z23 NEED FOR VACCINATION: Primary | ICD-10-CM

## 2021-09-17 PROCEDURE — 0001A SARSCOV2 VAC 30MCG/0.3ML IM: CPT

## 2021-09-27 ENCOUNTER — NURSE ONLY (OUTPATIENT)
Dept: FAMILY MEDICINE CLINIC | Facility: CLINIC | Age: 31
End: 2021-09-27
Payer: MEDICAID

## 2021-09-27 DIAGNOSIS — Z23 NEED FOR VACCINATION: ICD-10-CM

## 2021-09-27 DIAGNOSIS — Z23 INFLUENZA VACCINATION GIVEN: ICD-10-CM

## 2021-09-27 DIAGNOSIS — Z11.1 SCREENING FOR TUBERCULOSIS: Primary | ICD-10-CM

## 2021-09-27 PROCEDURE — 90471 IMMUNIZATION ADMIN: CPT | Performed by: PHYSICIAN ASSISTANT

## 2021-09-27 PROCEDURE — 90686 IIV4 VACC NO PRSV 0.5 ML IM: CPT | Performed by: PHYSICIAN ASSISTANT

## 2021-09-27 PROCEDURE — 86580 TB INTRADERMAL TEST: CPT | Performed by: PHYSICIAN ASSISTANT

## 2021-09-27 NOTE — PROGRESS NOTES
Ami Song is a 36year old female who presents for PPD skin testing for TB screening. Here for a TB test, also would like a flu shot. TUBERCULOSIS SCREENING QUESTIONNAIRE    · Live vaccines in the past month?    no  · Any steroid medication i

## 2021-09-30 ENCOUNTER — OFFICE VISIT (OUTPATIENT)
Dept: FAMILY MEDICINE CLINIC | Facility: CLINIC | Age: 31
End: 2021-09-30
Payer: MEDICAID

## 2021-09-30 DIAGNOSIS — Z11.1 SCREENING FOR TUBERCULOSIS: Primary | ICD-10-CM

## 2021-10-05 ENCOUNTER — OFFICE VISIT (OUTPATIENT)
Dept: FAMILY MEDICINE CLINIC | Facility: CLINIC | Age: 31
End: 2021-10-05
Payer: MEDICAID

## 2021-10-05 DIAGNOSIS — Z11.1 SCREENING EXAMINATION FOR PULMONARY TUBERCULOSIS: Primary | ICD-10-CM

## 2021-10-05 PROCEDURE — 86580 TB INTRADERMAL TEST: CPT | Performed by: PHYSICIAN ASSISTANT

## 2021-10-05 NOTE — PROGRESS NOTES
Betty Driscoll is a 36year old female who presents for TB testing. TUBERCULOSIS SCREENING QUESTIONNAIRE    · Live vaccines in the past month? no  · Any steroid medication in the past month? no  · History of BCG vaccine?     no  · If female, are

## 2021-10-05 NOTE — PATIENT INSTRUCTIONS
You will need to return to clinic in 48-72 hours to have results of TB test read. Please return to clinic on 10/7/21 after 1415 or on 10/8/21 before 1415 to have your TB test read.     If you do not return during this time your test will need to be repe

## 2021-10-08 ENCOUNTER — IMMUNIZATION (OUTPATIENT)
Dept: LAB | Facility: HOSPITAL | Age: 31
End: 2021-10-08
Attending: EMERGENCY MEDICINE
Payer: MEDICAID

## 2021-10-08 ENCOUNTER — OFFICE VISIT (OUTPATIENT)
Dept: FAMILY MEDICINE CLINIC | Facility: CLINIC | Age: 31
End: 2021-10-08
Payer: MEDICAID

## 2021-10-08 DIAGNOSIS — Z11.1 ENCOUNTER FOR PPD SKIN TEST READING: Primary | ICD-10-CM

## 2021-10-08 DIAGNOSIS — Z23 NEED FOR VACCINATION: Primary | ICD-10-CM

## 2021-10-08 PROCEDURE — 0002A SARSCOV2 VAC 30MCG/0.3ML IM: CPT

## 2021-10-08 NOTE — PROGRESS NOTES
Patient presents for PPD read. Test placed on left forearm    Results: 0 mm induration. Letter of results printed and given patient. No further questions or concerns at this time. Of note, initally letter states right arm checked.  Verified l

## 2021-11-24 ENCOUNTER — HOSPITAL ENCOUNTER (EMERGENCY)
Facility: HOSPITAL | Age: 31
Discharge: HOME | End: 2021-11-24
Attending: EMERGENCY MEDICINE
Payer: MEDICAID

## 2021-11-24 VITALS
HEIGHT: 65 IN | RESPIRATION RATE: 16 BRPM | HEART RATE: 97 BPM | BODY MASS INDEX: 39.15 KG/M2 | SYSTOLIC BLOOD PRESSURE: 126 MMHG | WEIGHT: 235 LBS | OXYGEN SATURATION: 100 % | TEMPERATURE: 99 F | DIASTOLIC BLOOD PRESSURE: 72 MMHG

## 2021-11-24 DIAGNOSIS — S09.90XA INJURY OF HEAD, INITIAL ENCOUNTER: Primary | ICD-10-CM

## 2021-11-24 DIAGNOSIS — S00.83XA TRAUMATIC HEMATOMA OF FOREHEAD, INITIAL ENCOUNTER: ICD-10-CM

## 2021-11-24 PROCEDURE — 99283 EMERGENCY DEPT VISIT LOW MDM: CPT

## 2021-11-24 NOTE — ED PROVIDER NOTES
Patient Seen in: [facility] AND [facility] Emergency Department      History   Patient presents with:  Trauma    Stated Complaint: head injury    Subjective:   HPI    51-year-old female with no significant past medical history here with complaints of head injury HPI.  Constitutional and vital signs reviewed. All other systems reviewed and negative except as noted above.     Physical Exam     ED Triage Vitals [11/24/21 1156]   /72   Pulse 97   Resp 16   Temp 98.8 °F (37.1 °C)   Temp src Temporal   SpO2 10 stable  -Brown CT Head Rule  GCS <15 at 2 hours post-injury  Suspected open or depressed skull fracture  Any sign of basilar skull fracture  >2 episodes of vomiting  Age >65 years  Retrograde amnesia to the event >30minutes  Dangerous mechanism    If al
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
## 2022-06-13 ENCOUNTER — TELEPHONE (OUTPATIENT)
Dept: OBGYN CLINIC | Facility: CLINIC | Age: 32
End: 2022-06-13

## 2022-06-16 ENCOUNTER — OFFICE VISIT (OUTPATIENT)
Dept: OBGYN CLINIC | Facility: CLINIC | Age: 32
End: 2022-06-16
Payer: MEDICAID

## 2022-06-16 VITALS
DIASTOLIC BLOOD PRESSURE: 79 MMHG | WEIGHT: 252 LBS | HEART RATE: 101 BPM | BODY MASS INDEX: 41.99 KG/M2 | HEIGHT: 65 IN | SYSTOLIC BLOOD PRESSURE: 126 MMHG

## 2022-06-16 DIAGNOSIS — Z30.46 NEXPLANON REMOVAL: Primary | ICD-10-CM

## 2022-06-16 PROCEDURE — 3008F BODY MASS INDEX DOCD: CPT | Performed by: ADVANCED PRACTICE MIDWIFE

## 2022-06-16 PROCEDURE — 11982 REMOVE DRUG IMPLANT DEVICE: CPT | Performed by: ADVANCED PRACTICE MIDWIFE

## 2022-06-16 PROCEDURE — 3078F DIAST BP <80 MM HG: CPT | Performed by: ADVANCED PRACTICE MIDWIFE

## 2022-06-16 PROCEDURE — 3074F SYST BP LT 130 MM HG: CPT | Performed by: ADVANCED PRACTICE MIDWIFE

## 2022-06-16 NOTE — PROCEDURES
Nexplanon Removal    Consent was obtained from the patient. Removal:  1 % lidocaine with Epinephrine was injected underneath the tip of the Nexplanon bashir that is closest to the left elbow. Nexplanon was located by palpation. 3 mm incision was made at the tip of the bashir closest to the elbow. Nexplanon was pushed toward the incision. Nexplanon bashir was bluntly dissected from the tissue sheath. The entire Nexplanon bashir was removed. Visit Plan:  Steri-Strips were applied to the skin incision. An Ace bandage was wrapped around the injected arm. Patient was instructed to remove Ace bandage in 24 hours. Patient was instructed to remove Steri-Strips in 7 days. Reviewed need for alternative birth control as fertility may return immediately after removal.  All of the patient's questions were addressed.

## 2022-07-28 ENCOUNTER — LAB REQUISITION (OUTPATIENT)
Dept: LAB | Age: 32
End: 2022-07-28

## 2022-07-28 DIAGNOSIS — Z00.00 ENCOUNTER FOR GENERAL ADULT MEDICAL EXAMINATION WITHOUT ABNORMAL FINDINGS: ICD-10-CM

## 2022-07-28 PROCEDURE — PSEU9049 QUANTIFERON TB PLUS: Performed by: CLINICAL MEDICAL LABORATORY

## 2022-07-28 PROCEDURE — 86480 TB TEST CELL IMMUN MEASURE: CPT | Performed by: CLINICAL MEDICAL LABORATORY

## 2022-07-30 LAB
GAMMA INTERFERON BACKGROUND BLD IA-ACNC: 0.05 IU/ML
M TB IFN-G BLD-IMP: NEGATIVE
M TB IFN-G CD4+ BCKGRND COR BLD-ACNC: 0 IU/ML
M TB IFN-G CD4+CD8+ BCKGRND COR BLD-ACNC: 0 IU/ML
MITOGEN IGNF BCKGRD COR BLD-ACNC: 8.93 IU/ML

## 2022-11-10 VITALS
WEIGHT: 264 LBS | HEIGHT: 65 IN | OXYGEN SATURATION: 99 % | RESPIRATION RATE: 18 BRPM | HEART RATE: 98 BPM | SYSTOLIC BLOOD PRESSURE: 145 MMHG | BODY MASS INDEX: 43.99 KG/M2 | TEMPERATURE: 99 F | DIASTOLIC BLOOD PRESSURE: 94 MMHG

## 2022-11-10 PROCEDURE — 99283 EMERGENCY DEPT VISIT LOW MDM: CPT

## 2022-11-11 ENCOUNTER — HOSPITAL ENCOUNTER (EMERGENCY)
Facility: HOSPITAL | Age: 32
Discharge: HOME | End: 2022-11-11
Payer: MEDICAID

## 2022-11-11 DIAGNOSIS — L03.211 FACIAL CELLULITIS: Primary | ICD-10-CM

## 2022-11-11 LAB — B-HCG UR QL: NEGATIVE

## 2022-11-11 PROCEDURE — 81025 URINE PREGNANCY TEST: CPT

## 2022-11-11 PROCEDURE — 87070 CULTURE OTHR SPECIMN AEROBIC: CPT | Performed by: NURSE PRACTITIONER

## 2022-11-11 PROCEDURE — 87205 SMEAR GRAM STAIN: CPT | Performed by: NURSE PRACTITIONER

## 2022-11-11 PROCEDURE — 87077 CULTURE AEROBIC IDENTIFY: CPT | Performed by: NURSE PRACTITIONER

## 2022-11-11 RX ORDER — HYDROCODONE BITARTRATE AND ACETAMINOPHEN 5; 325 MG/1; MG/1
1-2 TABLET ORAL EVERY 6 HOURS PRN
Qty: 10 TABLET | Refills: 0 | Status: SHIPPED | OUTPATIENT
Start: 2022-11-11 | End: 2022-11-16

## 2022-11-11 RX ORDER — SULFAMETHOXAZOLE AND TRIMETHOPRIM 800; 160 MG/1; MG/1
1 TABLET ORAL ONCE
Status: COMPLETED | OUTPATIENT
Start: 2022-11-11 | End: 2022-11-11

## 2022-11-11 RX ORDER — SULFAMETHOXAZOLE AND TRIMETHOPRIM 800; 160 MG/1; MG/1
1 TABLET ORAL 2 TIMES DAILY
Qty: 14 TABLET | Refills: 0 | Status: SHIPPED | OUTPATIENT
Start: 2022-11-11 | End: 2022-11-18

## 2022-11-11 NOTE — ED INITIAL ASSESSMENT (HPI)
Lesion to right side of face that she first noticed yesterday. Also reports pain to right side of face and jaw.

## 2023-03-08 ENCOUNTER — OFFICE VISIT (OUTPATIENT)
Dept: OBGYN CLINIC | Facility: CLINIC | Age: 33
End: 2023-03-08

## 2023-03-08 VITALS
DIASTOLIC BLOOD PRESSURE: 80 MMHG | HEART RATE: 97 BPM | SYSTOLIC BLOOD PRESSURE: 135 MMHG | HEIGHT: 65 IN | BODY MASS INDEX: 42.49 KG/M2 | WEIGHT: 255 LBS

## 2023-03-08 DIAGNOSIS — N92.6 MISSED MENSES: Primary | ICD-10-CM

## 2023-03-08 PROBLEM — E66.01 MORBID OBESITY WITH BMI OF 40.0-44.9, ADULT (HCC): Status: ACTIVE | Noted: 2019-01-03

## 2023-03-08 LAB
CONTROL LINE PRESENT WITH A CLEAR BACKGROUND (YES/NO): YES YES/NO
PREGNANCY TEST, URINE: POSITIVE

## 2023-03-08 PROCEDURE — 3075F SYST BP GE 130 - 139MM HG: CPT | Performed by: ADVANCED PRACTICE MIDWIFE

## 2023-03-08 PROCEDURE — 99214 OFFICE O/P EST MOD 30 MIN: CPT | Performed by: ADVANCED PRACTICE MIDWIFE

## 2023-03-08 PROCEDURE — 3079F DIAST BP 80-89 MM HG: CPT | Performed by: ADVANCED PRACTICE MIDWIFE

## 2023-03-08 PROCEDURE — 81025 URINE PREGNANCY TEST: CPT | Performed by: ADVANCED PRACTICE MIDWIFE

## 2023-03-08 PROCEDURE — 3008F BODY MASS INDEX DOCD: CPT | Performed by: ADVANCED PRACTICE MIDWIFE

## 2023-03-08 NOTE — ASSESSMENT & PLAN NOTE
Plan MFM consult  Plan early 1 hour GTT  ASA 81 mg per 2 moderate risk factors (AA race and obesity)

## 2023-03-09 NOTE — PROGRESS NOTES
I personally witnessed the patient's exam and medical decision making on this date of service. I was physically present in the room for the performance of the E/M service. I have reviewed the CNM student's documentation and findings including history, Exam, and Medical Decision Making, edited the document for accuracy and verify that it represents the clinical findings and services performed.     30 minutes face to face counseling, chart review, orders and coordination of care

## 2023-03-20 NOTE — PROGRESS NOTES
Ronnie Jimenez is a 24year old female. Reason for Consult:   Exposed to HIV. Father of baby has HIV but low viral load. Her HIV Ag Ab combo test was nonreactive. Doing well. Denies any prenatal complications.   Review of History:     OB History Modified Advancement Flap Text: The defect edges were debeveled with a #15 scalpel blade.  Given the location of the defect, shape of the defect and the proximity to free margins a modified advancement flap was deemed most appropriate.  Using a sterile surgical marker, an appropriate advancement flap was drawn incorporating the defect and placing the expected incisions within the relaxed skin tension lines where possible.    The area thus outlined was incised deep to adipose tissue with a #15 scalpel blade.  The skin margins were undermined to an appropriate distance in all directions utilizing iris scissors.

## 2023-03-31 ENCOUNTER — NURSE ONLY (OUTPATIENT)
Dept: OBGYN CLINIC | Facility: CLINIC | Age: 33
End: 2023-03-31
Payer: MEDICAID

## 2023-03-31 VITALS — BODY MASS INDEX: 42 KG/M2 | WEIGHT: 255 LBS

## 2023-03-31 DIAGNOSIS — Z34.01 ENCOUNTER FOR SUPERVISION OF NORMAL FIRST PREGNANCY IN FIRST TRIMESTER: ICD-10-CM

## 2023-03-31 DIAGNOSIS — O99.210 OBESITY AFFECTING PREGNANCY, ANTEPARTUM: Primary | ICD-10-CM

## 2023-03-31 DIAGNOSIS — Z20.6 HISTORY OF EXPOSURE TO HIV: ICD-10-CM

## 2023-03-31 RX ORDER — CHOLECALCIFEROL (VITAMIN D3) 25 MCG
1 TABLET,CHEWABLE ORAL DAILY
COMMUNITY

## 2023-03-31 NOTE — PROGRESS NOTES
Phone call today for OB RN education visit, educational material reviewed. Pt verbalized understanding. Orders placed for NOB labs including HCV, hemoglobin A1C, 1 hour gtt, varicella,  Hemoglobin electrophresis, cmp, 24hour urine, MFM and nutrition consult. Patient declined genetic testing. Pt had a Pap around . Pt was scheduled for NOB appt. Patient will complete EDPS and ELIZABETH at next appointment. Pt desires water birth if possible    Pt. Has answered NO 5P questions and has NO  risk factors. Pt. Given What Pregnant need to Know handout     Pt. Counseled on AGOG and ACNM guidelines recommending carrier testing. . Patient declined testing.

## 2023-04-01 ENCOUNTER — LAB ENCOUNTER (OUTPATIENT)
Dept: LAB | Facility: HOSPITAL | Age: 33
End: 2023-04-01
Attending: ADVANCED PRACTICE MIDWIFE
Payer: MEDICAID

## 2023-04-01 DIAGNOSIS — Z34.01 ENCOUNTER FOR SUPERVISION OF NORMAL FIRST PREGNANCY IN FIRST TRIMESTER: ICD-10-CM

## 2023-04-01 DIAGNOSIS — O99.210 OBESITY AFFECTING PREGNANCY, ANTEPARTUM: ICD-10-CM

## 2023-04-01 DIAGNOSIS — Z20.6 HISTORY OF EXPOSURE TO HIV: ICD-10-CM

## 2023-04-01 LAB
ALBUMIN SERPL-MCNC: 3.4 G/DL (ref 3.4–5)
ALBUMIN/GLOB SERPL: 0.7 {RATIO} (ref 1–2)
ALP LIVER SERPL-CCNC: 92 U/L
ALT SERPL-CCNC: 26 U/L
ANION GAP SERPL CALC-SCNC: 4 MMOL/L (ref 0–18)
ANTIBODY SCREEN: NEGATIVE
BASOPHILS # BLD AUTO: 0.01 X10(3) UL (ref 0–0.2)
BASOPHILS NFR BLD AUTO: 0.1 %
BILIRUB SERPL-MCNC: 0.3 MG/DL (ref 0.1–2)
BUN BLD-MCNC: 8 MG/DL (ref 7–18)
BUN/CREAT SERPL: 10.7 (ref 10–20)
CALCIUM BLD-MCNC: 9 MG/DL (ref 8.5–10.1)
CHLORIDE SERPL-SCNC: 106 MMOL/L (ref 98–112)
CO2 SERPL-SCNC: 23 MMOL/L (ref 21–32)
CREAT BLD-MCNC: 0.75 MG/DL
DEPRECATED RDW RBC AUTO: 39 FL (ref 35.1–46.3)
EOSINOPHIL # BLD AUTO: 0.07 X10(3) UL (ref 0–0.7)
EOSINOPHIL NFR BLD AUTO: 0.9 %
ERYTHROCYTE [DISTWIDTH] IN BLOOD BY AUTOMATED COUNT: 12.9 % (ref 11–15)
EST. AVERAGE GLUCOSE BLD GHB EST-MCNC: 111 MG/DL (ref 68–126)
FASTING STATUS PATIENT QL REPORTED: NO
GFR SERPLBLD BASED ON 1.73 SQ M-ARVRAT: 109 ML/MIN/1.73M2 (ref 60–?)
GLOBULIN PLAS-MCNC: 4.7 G/DL (ref 2.8–4.4)
GLUCOSE BLD-MCNC: 76 MG/DL (ref 70–99)
HBA1C MFR BLD: 5.5 % (ref ?–5.7)
HBV SURFACE AG SER-ACNC: <0.1 [IU]/L
HBV SURFACE AG SERPL QL IA: NONREACTIVE
HCT VFR BLD AUTO: 40.1 %
HCV AB SERPL QL IA: NONREACTIVE
HGB BLD-MCNC: 13.2 G/DL
IMM GRANULOCYTES # BLD AUTO: 0.04 X10(3) UL (ref 0–1)
IMM GRANULOCYTES NFR BLD: 0.5 %
LYMPHOCYTES # BLD AUTO: 1.68 X10(3) UL (ref 1–4)
LYMPHOCYTES NFR BLD AUTO: 20.8 %
MCH RBC QN AUTO: 27.6 PG (ref 26–34)
MCHC RBC AUTO-ENTMCNC: 32.9 G/DL (ref 31–37)
MCV RBC AUTO: 83.7 FL
MONOCYTES # BLD AUTO: 0.36 X10(3) UL (ref 0.1–1)
MONOCYTES NFR BLD AUTO: 4.4 %
NEUTROPHILS # BLD AUTO: 5.93 X10 (3) UL (ref 1.5–7.7)
NEUTROPHILS # BLD AUTO: 5.93 X10(3) UL (ref 1.5–7.7)
NEUTROPHILS NFR BLD AUTO: 73.3 %
OSMOLALITY SERPL CALC.SUM OF ELEC: 273 MOSM/KG (ref 275–295)
PLATELET # BLD AUTO: 251 10(3)UL (ref 150–450)
PROT SERPL-MCNC: 8.1 G/DL (ref 6.4–8.2)
RBC # BLD AUTO: 4.79 X10(6)UL
RH BLOOD TYPE: POSITIVE
RUBV IGG SER QL: POSITIVE
RUBV IGG SER-ACNC: 182.5 IU/ML (ref 10–?)
SODIUM SERPL-SCNC: 133 MMOL/L (ref 136–145)
WBC # BLD AUTO: 8.1 X10(3) UL (ref 4–11)

## 2023-04-01 PROCEDURE — 86803 HEPATITIS C AB TEST: CPT

## 2023-04-01 PROCEDURE — 85025 COMPLETE CBC W/AUTO DIFF WBC: CPT

## 2023-04-01 PROCEDURE — 86850 RBC ANTIBODY SCREEN: CPT

## 2023-04-01 PROCEDURE — 86787 VARICELLA-ZOSTER ANTIBODY: CPT

## 2023-04-01 PROCEDURE — 36415 COLL VENOUS BLD VENIPUNCTURE: CPT

## 2023-04-01 PROCEDURE — 83021 HEMOGLOBIN CHROMOTOGRAPHY: CPT

## 2023-04-01 PROCEDURE — 87340 HEPATITIS B SURFACE AG IA: CPT

## 2023-04-01 PROCEDURE — 80053 COMPREHEN METABOLIC PANEL: CPT

## 2023-04-01 PROCEDURE — 86780 TREPONEMA PALLIDUM: CPT

## 2023-04-01 PROCEDURE — 83020 HEMOGLOBIN ELECTROPHORESIS: CPT

## 2023-04-01 PROCEDURE — 86901 BLOOD TYPING SEROLOGIC RH(D): CPT

## 2023-04-01 PROCEDURE — 86762 RUBELLA ANTIBODY: CPT

## 2023-04-01 PROCEDURE — 87389 HIV-1 AG W/HIV-1&-2 AB AG IA: CPT

## 2023-04-01 PROCEDURE — 82664 ELECTROPHORETIC TEST: CPT

## 2023-04-01 PROCEDURE — 87086 URINE CULTURE/COLONY COUNT: CPT

## 2023-04-01 PROCEDURE — 86900 BLOOD TYPING SEROLOGIC ABO: CPT

## 2023-04-01 PROCEDURE — 83036 HEMOGLOBIN GLYCOSYLATED A1C: CPT

## 2023-04-03 LAB
T PALLIDUM AB SER QL: NEGATIVE
VZV IGG SER IA-ACNC: >4000 (ref 165–?)

## 2023-04-04 ENCOUNTER — HOSPITAL ENCOUNTER (EMERGENCY)
Facility: HOSPITAL | Age: 33
Discharge: HOME | End: 2023-04-04
Attending: EMERGENCY MEDICINE
Payer: MEDICAID

## 2023-04-04 ENCOUNTER — APPOINTMENT (OUTPATIENT)
Dept: GENERAL RADIOLOGY | Facility: HOSPITAL | Age: 33
End: 2023-04-04
Attending: EMERGENCY MEDICINE
Payer: MEDICAID

## 2023-04-04 VITALS
WEIGHT: 255 LBS | HEIGHT: 65 IN | BODY MASS INDEX: 42.49 KG/M2 | OXYGEN SATURATION: 98 % | RESPIRATION RATE: 20 BRPM | HEART RATE: 95 BPM | TEMPERATURE: 98 F | SYSTOLIC BLOOD PRESSURE: 132 MMHG | DIASTOLIC BLOOD PRESSURE: 84 MMHG

## 2023-04-04 DIAGNOSIS — S00.33XA CONTUSION OF NOSE, INITIAL ENCOUNTER: Primary | ICD-10-CM

## 2023-04-04 PROCEDURE — 70160 X-RAY EXAM OF NASAL BONES: CPT | Performed by: EMERGENCY MEDICINE

## 2023-04-04 PROCEDURE — 99283 EMERGENCY DEPT VISIT LOW MDM: CPT

## 2023-04-05 LAB
HGB A2 MFR BLD: 3.1 % (ref 1.5–3.5)
HGB PNL BLD ELPH: 62.6 % (ref 95.5–100)
HGB S MFR BLD: 34.3 %
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
## 2023-04-05 NOTE — DISCHARGE INSTRUCTIONS
Ice to affected area  Tylenol as needed  Return for difficulty breathing or abdominal pain or feeling unsafe
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
## 2023-04-12 ENCOUNTER — LAB ENCOUNTER (OUTPATIENT)
Dept: LAB | Facility: HOSPITAL | Age: 33
End: 2023-04-12
Attending: ADVANCED PRACTICE MIDWIFE
Payer: MEDICAID

## 2023-04-12 DIAGNOSIS — Z34.01 ENCOUNTER FOR SUPERVISION OF NORMAL FIRST PREGNANCY IN FIRST TRIMESTER: ICD-10-CM

## 2023-04-12 DIAGNOSIS — O99.210 OBESITY AFFECTING PREGNANCY, ANTEPARTUM: ICD-10-CM

## 2023-04-12 DIAGNOSIS — Z20.6 HISTORY OF EXPOSURE TO HIV: ICD-10-CM

## 2023-04-12 LAB
GLUCOSE 1H P GLC SERPL-MCNC: 114 MG/DL
M PROTEIN 24H UR ELPH-MRATE: 134.4 MG/24 HR (ref ?–149.1)
SPECIMEN VOL UR: 2400 ML

## 2023-04-12 PROCEDURE — 82950 GLUCOSE TEST: CPT

## 2023-04-12 PROCEDURE — 84156 ASSAY OF PROTEIN URINE: CPT

## 2023-04-12 PROCEDURE — 36415 COLL VENOUS BLD VENIPUNCTURE: CPT

## 2023-04-17 ENCOUNTER — INITIAL PRENATAL (OUTPATIENT)
Dept: OBGYN CLINIC | Facility: CLINIC | Age: 33
End: 2023-04-17

## 2023-04-17 VITALS
HEART RATE: 99 BPM | WEIGHT: 255 LBS | SYSTOLIC BLOOD PRESSURE: 117 MMHG | BODY MASS INDEX: 42 KG/M2 | DIASTOLIC BLOOD PRESSURE: 79 MMHG

## 2023-04-17 DIAGNOSIS — Z34.81 ENCOUNTER FOR SUPERVISION OF OTHER NORMAL PREGNANCY IN FIRST TRIMESTER: Primary | ICD-10-CM

## 2023-04-17 DIAGNOSIS — Z12.4 SCREENING FOR MALIGNANT NEOPLASM OF CERVIX: ICD-10-CM

## 2023-04-17 DIAGNOSIS — Z11.3 SCREEN FOR STD (SEXUALLY TRANSMITTED DISEASE): ICD-10-CM

## 2023-04-17 PROCEDURE — 0500F INITIAL PRENATAL CARE VISIT: CPT | Performed by: ADVANCED PRACTICE MIDWIFE

## 2023-04-17 PROCEDURE — 3078F DIAST BP <80 MM HG: CPT | Performed by: ADVANCED PRACTICE MIDWIFE

## 2023-04-17 PROCEDURE — 3074F SYST BP LT 130 MM HG: CPT | Performed by: ADVANCED PRACTICE MIDWIFE

## 2023-04-17 NOTE — PROGRESS NOTES
Jalen Paiz presents for NOB visit. Denies vaginal bleeding/spotting or pelvic pain. She has been having some nausea and vomiting. Discussed smaller/more frequent meals. Physical exam WNL. Pap collected. Reviewed warning signs and when to call.  JAKE 4wks

## 2023-04-18 LAB
C TRACH DNA SPEC QL NAA+PROBE: NEGATIVE
HPV I/H RISK 1 DNA SPEC QL NAA+PROBE: NEGATIVE
N GONORRHOEA DNA SPEC QL NAA+PROBE: NEGATIVE

## 2023-04-21 ENCOUNTER — APPOINTMENT (OUTPATIENT)
Dept: ULTRASOUND IMAGING | Facility: HOSPITAL | Age: 33
End: 2023-04-21
Attending: EMERGENCY MEDICINE
Payer: MEDICAID

## 2023-04-21 ENCOUNTER — HOSPITAL ENCOUNTER (EMERGENCY)
Facility: HOSPITAL | Age: 33
Discharge: HOME | End: 2023-04-21
Attending: EMERGENCY MEDICINE
Payer: MEDICAID

## 2023-04-21 VITALS
OXYGEN SATURATION: 98 % | HEART RATE: 89 BPM | WEIGHT: 255 LBS | HEIGHT: 65 IN | SYSTOLIC BLOOD PRESSURE: 136 MMHG | BODY MASS INDEX: 42.49 KG/M2 | RESPIRATION RATE: 22 BRPM | TEMPERATURE: 98 F | DIASTOLIC BLOOD PRESSURE: 89 MMHG

## 2023-04-21 DIAGNOSIS — O03.9 MISCARRIAGE: Primary | ICD-10-CM

## 2023-04-21 LAB
ANION GAP SERPL CALC-SCNC: 7 MMOL/L (ref 0–18)
ANTIBODY SCREEN: NEGATIVE
B-HCG SERPL-ACNC: ABNORMAL MIU/ML
BASOPHILS # BLD AUTO: 0.02 X10(3) UL (ref 0–0.2)
BASOPHILS NFR BLD AUTO: 0.2 %
BILIRUB UR QL: NEGATIVE
BUN BLD-MCNC: 6 MG/DL (ref 7–18)
BUN/CREAT SERPL: 8.2 (ref 10–20)
CALCIUM BLD-MCNC: 8.8 MG/DL (ref 8.5–10.1)
CHLORIDE SERPL-SCNC: 108 MMOL/L (ref 98–112)
CO2 SERPL-SCNC: 23 MMOL/L (ref 21–32)
CREAT BLD-MCNC: 0.73 MG/DL
DEPRECATED RDW RBC AUTO: 38.4 FL (ref 35.1–46.3)
EOSINOPHIL # BLD AUTO: 0.05 X10(3) UL (ref 0–0.7)
EOSINOPHIL NFR BLD AUTO: 0.4 %
ERYTHROCYTE [DISTWIDTH] IN BLOOD BY AUTOMATED COUNT: 13.2 % (ref 11–15)
GFR SERPLBLD BASED ON 1.73 SQ M-ARVRAT: 113 ML/MIN/1.73M2 (ref 60–?)
GLUCOSE BLD-MCNC: 82 MG/DL (ref 70–99)
GLUCOSE UR-MCNC: NORMAL MG/DL
HCT VFR BLD AUTO: 36.2 %
HGB BLD-MCNC: 12.4 G/DL
IMM GRANULOCYTES # BLD AUTO: 0.03 X10(3) UL (ref 0–1)
IMM GRANULOCYTES NFR BLD: 0.2 %
KETONES UR-MCNC: NEGATIVE MG/DL
LEUKOCYTE ESTERASE UR QL STRIP.AUTO: 75
LYMPHOCYTES # BLD AUTO: 1.91 X10(3) UL (ref 1–4)
LYMPHOCYTES NFR BLD AUTO: 15.7 %
MCH RBC QN AUTO: 27.7 PG (ref 26–34)
MCHC RBC AUTO-ENTMCNC: 34.3 G/DL (ref 31–37)
MCV RBC AUTO: 80.8 FL
MONOCYTES # BLD AUTO: 0.65 X10(3) UL (ref 0.1–1)
MONOCYTES NFR BLD AUTO: 5.3 %
NEUTROPHILS # BLD AUTO: 9.54 X10 (3) UL (ref 1.5–7.7)
NEUTROPHILS # BLD AUTO: 9.54 X10(3) UL (ref 1.5–7.7)
NEUTROPHILS NFR BLD AUTO: 78.2 %
NITRITE UR QL STRIP.AUTO: NEGATIVE
OSMOLALITY SERPL CALC.SUM OF ELEC: 283 MOSM/KG (ref 275–295)
PH UR: 6.5 [PH] (ref 5–8)
PLATELET # BLD AUTO: 261 10(3)UL (ref 150–450)
POTASSIUM SERPL-SCNC: 3.7 MMOL/L (ref 3.5–5.1)
PROT UR-MCNC: 70 MG/DL
RBC # BLD AUTO: 4.48 X10(6)UL
RBC #/AREA URNS AUTO: >10 /HPF
RH BLOOD TYPE: POSITIVE
SODIUM SERPL-SCNC: 138 MMOL/L (ref 136–145)
SP GR UR STRIP: 1 (ref 1–1.03)
UROBILINOGEN UR STRIP-ACNC: NORMAL
WBC # BLD AUTO: 12.2 X10(3) UL (ref 4–11)

## 2023-04-21 PROCEDURE — 86900 BLOOD TYPING SEROLOGIC ABO: CPT | Performed by: EMERGENCY MEDICINE

## 2023-04-21 PROCEDURE — 99285 EMERGENCY DEPT VISIT HI MDM: CPT

## 2023-04-21 PROCEDURE — 76817 TRANSVAGINAL US OBSTETRIC: CPT | Performed by: EMERGENCY MEDICINE

## 2023-04-21 PROCEDURE — 85025 COMPLETE CBC W/AUTO DIFF WBC: CPT | Performed by: EMERGENCY MEDICINE

## 2023-04-21 PROCEDURE — 80048 BASIC METABOLIC PNL TOTAL CA: CPT | Performed by: EMERGENCY MEDICINE

## 2023-04-21 PROCEDURE — 84702 CHORIONIC GONADOTROPIN TEST: CPT | Performed by: EMERGENCY MEDICINE

## 2023-04-21 PROCEDURE — 86901 BLOOD TYPING SEROLOGIC RH(D): CPT

## 2023-04-21 PROCEDURE — 87086 URINE CULTURE/COLONY COUNT: CPT | Performed by: EMERGENCY MEDICINE

## 2023-04-21 PROCEDURE — 81001 URINALYSIS AUTO W/SCOPE: CPT | Performed by: EMERGENCY MEDICINE

## 2023-04-21 PROCEDURE — 86901 BLOOD TYPING SEROLOGIC RH(D): CPT | Performed by: EMERGENCY MEDICINE

## 2023-04-21 PROCEDURE — 86850 RBC ANTIBODY SCREEN: CPT | Performed by: EMERGENCY MEDICINE

## 2023-04-21 PROCEDURE — 36415 COLL VENOUS BLD VENIPUNCTURE: CPT

## 2023-04-21 PROCEDURE — 85025 COMPLETE CBC W/AUTO DIFF WBC: CPT

## 2023-04-21 PROCEDURE — 86900 BLOOD TYPING SEROLOGIC ABO: CPT

## 2023-04-21 PROCEDURE — 80048 BASIC METABOLIC PNL TOTAL CA: CPT

## 2023-04-21 PROCEDURE — 99284 EMERGENCY DEPT VISIT MOD MDM: CPT

## 2023-04-21 PROCEDURE — 84702 CHORIONIC GONADOTROPIN TEST: CPT

## 2023-04-21 PROCEDURE — 86850 RBC ANTIBODY SCREEN: CPT

## 2023-04-21 PROCEDURE — 76801 OB US < 14 WKS SINGLE FETUS: CPT | Performed by: EMERGENCY MEDICINE

## 2023-06-20 VITALS
DIASTOLIC BLOOD PRESSURE: 84 MMHG | RESPIRATION RATE: 20 BRPM | HEIGHT: 65 IN | OXYGEN SATURATION: 99 % | SYSTOLIC BLOOD PRESSURE: 143 MMHG | WEIGHT: 240 LBS | HEART RATE: 86 BPM | BODY MASS INDEX: 39.99 KG/M2 | TEMPERATURE: 98 F

## 2023-06-20 PROCEDURE — 99283 EMERGENCY DEPT VISIT LOW MDM: CPT

## 2023-06-20 PROCEDURE — 99284 EMERGENCY DEPT VISIT MOD MDM: CPT

## 2023-06-21 ENCOUNTER — HOSPITAL ENCOUNTER (EMERGENCY)
Facility: HOSPITAL | Age: 33
Discharge: HOME | End: 2023-06-21
Attending: EMERGENCY MEDICINE
Payer: MEDICAID

## 2023-06-21 DIAGNOSIS — J02.0 STREP PHARYNGITIS: Primary | ICD-10-CM

## 2023-06-21 LAB — S PYO AG THROAT QL: POSITIVE

## 2023-06-21 PROCEDURE — 87880 STREP A ASSAY W/OPTIC: CPT

## 2023-06-21 RX ORDER — AMOXICILLIN 500 MG/1
500 CAPSULE ORAL ONCE
Status: COMPLETED | OUTPATIENT
Start: 2023-06-21 | End: 2023-06-21

## 2023-06-21 RX ORDER — DEXAMETHASONE SODIUM PHOSPHATE 10 MG/ML
10 INJECTION, SOLUTION INTRAMUSCULAR; INTRAVENOUS ONCE
Status: COMPLETED | OUTPATIENT
Start: 2023-06-21 | End: 2023-06-21

## 2023-06-21 RX ORDER — AMOXICILLIN 500 MG/1
500 TABLET, FILM COATED ORAL 2 TIMES DAILY
Qty: 20 TABLET | Refills: 0 | Status: SHIPPED | OUTPATIENT
Start: 2023-06-21 | End: 2023-07-01

## 2023-06-21 NOTE — ED INITIAL ASSESSMENT (HPI)
Patient from home, c/c sore throat started yesterday after a week of nasal and chest congestion. Pain with swallowing.

## 2023-06-24 NOTE — LETTER
October 8, 2021    [address] [place] [phone]      Dear Gwendolyn Gilbert: The following are the results of your recent tests. Please review the list of test results.   Your result is the value on the left; we have also supplied the Home

## 2023-06-28 ENCOUNTER — OFFICE VISIT (OUTPATIENT)
Dept: OBGYN CLINIC | Facility: CLINIC | Age: 33
End: 2023-06-28

## 2023-06-28 VITALS — HEART RATE: 97 BPM | SYSTOLIC BLOOD PRESSURE: 129 MMHG | DIASTOLIC BLOOD PRESSURE: 88 MMHG

## 2023-06-28 DIAGNOSIS — Z32.02 PREGNANCY EXAMINATION OR TEST, NEGATIVE RESULT: ICD-10-CM

## 2023-06-28 DIAGNOSIS — N93.0 PCB (POST COITAL BLEEDING): Primary | ICD-10-CM

## 2023-06-28 DIAGNOSIS — Z11.3 SCREEN FOR STD (SEXUALLY TRANSMITTED DISEASE): ICD-10-CM

## 2023-06-28 DIAGNOSIS — N89.8 VAGINAL IRRITATION: ICD-10-CM

## 2023-06-28 PROCEDURE — 3079F DIAST BP 80-89 MM HG: CPT | Performed by: ADVANCED PRACTICE MIDWIFE

## 2023-06-28 PROCEDURE — 81025 URINE PREGNANCY TEST: CPT | Performed by: ADVANCED PRACTICE MIDWIFE

## 2023-06-28 PROCEDURE — 3074F SYST BP LT 130 MM HG: CPT | Performed by: ADVANCED PRACTICE MIDWIFE

## 2023-06-28 PROCEDURE — 99213 OFFICE O/P EST LOW 20 MIN: CPT | Performed by: ADVANCED PRACTICE MIDWIFE

## 2023-06-29 LAB
C TRACH DNA SPEC QL NAA+PROBE: NEGATIVE
N GONORRHOEA DNA SPEC QL NAA+PROBE: NEGATIVE

## 2023-06-30 LAB
GENITAL VAGINOSIS SCREEN: NEGATIVE
TRICHOMONAS SCREEN: NEGATIVE

## 2023-07-01 RX ORDER — FLUCONAZOLE 150 MG/1
150 TABLET ORAL
Qty: 2 TABLET | Refills: 0 | Status: SHIPPED | OUTPATIENT
Start: 2023-07-01

## 2023-09-25 ENCOUNTER — OFFICE VISIT (OUTPATIENT)
Dept: OBGYN CLINIC | Facility: CLINIC | Age: 33
End: 2023-09-25

## 2023-09-25 VITALS
SYSTOLIC BLOOD PRESSURE: 119 MMHG | HEIGHT: 65 IN | DIASTOLIC BLOOD PRESSURE: 74 MMHG | HEART RATE: 93 BPM | BODY MASS INDEX: 42.32 KG/M2 | WEIGHT: 254 LBS

## 2023-09-25 DIAGNOSIS — O99.210 OBESITY AFFECTING PREGNANCY, ANTEPARTUM, UNSPECIFIED OBESITY TYPE: ICD-10-CM

## 2023-09-25 DIAGNOSIS — Z32.01 PREGNANCY EXAMINATION OR TEST, POSITIVE RESULT: ICD-10-CM

## 2023-09-25 DIAGNOSIS — N92.6 MISSED MENSES: Primary | ICD-10-CM

## 2023-09-25 LAB
CONTROL LINE PRESENT WITH A CLEAR BACKGROUND (YES/NO): YES YES/NO
KIT LOT #: NORMAL NUMERIC
PREGNANCY TEST, URINE: POSITIVE

## 2023-09-25 PROCEDURE — 3078F DIAST BP <80 MM HG: CPT | Performed by: ADVANCED PRACTICE MIDWIFE

## 2023-09-25 PROCEDURE — 3008F BODY MASS INDEX DOCD: CPT | Performed by: ADVANCED PRACTICE MIDWIFE

## 2023-09-25 PROCEDURE — 3074F SYST BP LT 130 MM HG: CPT | Performed by: ADVANCED PRACTICE MIDWIFE

## 2023-09-25 PROCEDURE — 81025 URINE PREGNANCY TEST: CPT | Performed by: ADVANCED PRACTICE MIDWIFE

## 2023-09-25 PROCEDURE — 99213 OFFICE O/P EST LOW 20 MIN: CPT | Performed by: ADVANCED PRACTICE MIDWIFE

## 2023-10-05 ENCOUNTER — HOSPITAL ENCOUNTER (OUTPATIENT)
Dept: ULTRASOUND IMAGING | Age: 33
Discharge: HOME | End: 2023-10-05
Attending: ADVANCED PRACTICE MIDWIFE
Payer: MEDICAID

## 2023-10-05 DIAGNOSIS — Z32.01 PREGNANCY EXAMINATION OR TEST, POSITIVE RESULT: ICD-10-CM

## 2023-10-05 PROCEDURE — 76802 OB US < 14 WKS ADDL FETUS: CPT | Performed by: ADVANCED PRACTICE MIDWIFE

## 2023-10-05 PROCEDURE — 76817 TRANSVAGINAL US OBSTETRIC: CPT | Performed by: ADVANCED PRACTICE MIDWIFE

## 2023-10-05 PROCEDURE — 76801 OB US < 14 WKS SINGLE FETUS: CPT | Performed by: ADVANCED PRACTICE MIDWIFE

## 2023-10-06 ENCOUNTER — TELEPHONE (OUTPATIENT)
Dept: OBGYN CLINIC | Facility: CLINIC | Age: 33
End: 2023-10-06

## 2023-10-06 NOTE — TELEPHONE ENCOUNTER
Name and  verified    Patient provided number for [id number]. Patient is sad, but understands that midwives do not deliver twins. Accepts phone number for office. Patient notified that she will have to schedule nurse education visit with this office, and further care with this office. Patient verbalized understanding.

## 2023-10-06 NOTE — TELEPHONE ENCOUNTER
----- Message from Shahnaz Green CNM sent at 10/6/2023 12:41 PM CDT -----  Twin pregnancy on ultrasound please schedule with consulting MDs as we do not care for twins. Needs for repeat ultrasound to determine choronicity/amnions - this can be done at their office.  Thanks [employer]

## 2023-10-09 ENCOUNTER — TELEPHONE (OUTPATIENT)
Dept: PERINATAL CARE | Facility: HOSPITAL | Age: 33
End: 2023-10-09

## 2023-10-09 NOTE — TELEPHONE ENCOUNTER
Returned Pts VM to schedule appt w/MFM. Order in for FTS from the Midwives, She said she no longer needs the appt and didn't schedule w/MFM.

## 2023-10-23 ENCOUNTER — ROUTINE PRENATAL (OUTPATIENT)
Dept: OBGYN CLINIC | Facility: CLINIC | Age: 33
End: 2023-10-23
Payer: MEDICAID

## 2023-10-23 VITALS — WEIGHT: 253 LBS | SYSTOLIC BLOOD PRESSURE: 122 MMHG | DIASTOLIC BLOOD PRESSURE: 76 MMHG | BODY MASS INDEX: 42 KG/M2

## 2023-10-23 DIAGNOSIS — Z34.80 SUPERVISION OF OTHER NORMAL PREGNANCY, ANTEPARTUM: Primary | ICD-10-CM

## 2023-10-23 DIAGNOSIS — O30.009 TWIN PREGNANCY, ANTEPARTUM, UNSPECIFIED MULTIPLE GESTATION TYPE: ICD-10-CM

## 2023-10-23 NOTE — PROGRESS NOTES
34year old  with twin pregnancy at [address] [place] [address] good! Some cramping but is irregular  No bleeding, abnormal discharge. PE:  BSUS with active fetus x 2, FHT baby A 160, baby B 168, no clear dividing membrane seen - suspicious for mono-mono. Return OB  Pre- Care: UTD. Twin pregnancy  - MFM consult ordered to determine placentation.     Patient Active Problem List:     Prenatal exposure to HIV affecting pregnancy management     Sickle cell trait ([facility] [address]     Morbid obesity with BMI of 40.0-44.9, adult ([facility] [address]      - Return to clinic in: 4 weeks    Christa Round, DO

## 2023-11-01 ENCOUNTER — TELEPHONE (OUTPATIENT)
Dept: OBGYN CLINIC | Facility: CLINIC | Age: 33
End: 2023-11-01

## 2023-11-01 ENCOUNTER — HOSPITAL ENCOUNTER (OUTPATIENT)
Dept: PERINATAL CARE | Facility: HOSPITAL | Age: 33
Discharge: HOME | End: 2023-11-01
Attending: OBSTETRICS & GYNECOLOGY
Payer: MEDICAID

## 2023-11-01 ENCOUNTER — LAB ENCOUNTER (OUTPATIENT)
Dept: LAB | Facility: HOSPITAL | Age: 33
End: 2023-11-01
Attending: OBSTETRICS & GYNECOLOGY
Payer: MEDICAID

## 2023-11-01 VITALS
SYSTOLIC BLOOD PRESSURE: 118 MMHG | DIASTOLIC BLOOD PRESSURE: 72 MMHG | BODY MASS INDEX: 42 KG/M2 | WEIGHT: 253 LBS | HEART RATE: 99 BPM

## 2023-11-01 DIAGNOSIS — E66.01 MORBID OBESITY WITH BMI OF 40.0-44.9, ADULT (HCC): ICD-10-CM

## 2023-11-01 DIAGNOSIS — Z34.81 ENCOUNTER FOR SUPERVISION OF OTHER NORMAL PREGNANCY IN FIRST TRIMESTER: Primary | ICD-10-CM

## 2023-11-01 DIAGNOSIS — Z34.81 ENCOUNTER FOR SUPERVISION OF OTHER NORMAL PREGNANCY IN FIRST TRIMESTER: ICD-10-CM

## 2023-11-01 DIAGNOSIS — O30.031 MONOCHORIONIC DIAMNIOTIC TWIN GESTATION IN FIRST TRIMESTER: ICD-10-CM

## 2023-11-01 DIAGNOSIS — O30.009 FIRST TRIMESTER SCREENING FOR TWIN PREGNANCY: Primary | ICD-10-CM

## 2023-11-01 DIAGNOSIS — O30.009 TWIN PREGNANCY, ANTEPARTUM, UNSPECIFIED MULTIPLE GESTATION TYPE: ICD-10-CM

## 2023-11-01 DIAGNOSIS — Z36.89 FIRST TRIMESTER SCREENING FOR TWIN PREGNANCY: Primary | ICD-10-CM

## 2023-11-01 DIAGNOSIS — Z34.81 PRENATAL CARE, SUBSEQUENT PREGNANCY, FIRST TRIMESTER: ICD-10-CM

## 2023-11-01 DIAGNOSIS — Z36.89 FIRST TRIMESTER SCREENING FOR TWIN PREGNANCY: ICD-10-CM

## 2023-11-01 DIAGNOSIS — O30.009 FIRST TRIMESTER SCREENING FOR TWIN PREGNANCY: ICD-10-CM

## 2023-11-01 PROBLEM — O30.039 MONOCHORIONIC DIAMNIOTIC TWIN PREGNANCY, ANTEPARTUM: Status: ACTIVE | Noted: 2023-11-01

## 2023-11-01 PROBLEM — O30.039 MONOCHORIONIC DIAMNIOTIC TWIN PREGNANCY, ANTEPARTUM (HCC): Status: ACTIVE | Noted: 2023-11-01

## 2023-11-01 LAB
ANTIBODY SCREEN: NEGATIVE
BASOPHILS # BLD AUTO: 0.01 X10(3) UL (ref 0–0.2)
BASOPHILS NFR BLD AUTO: 0.1 %
DEPRECATED RDW RBC AUTO: 38.4 FL (ref 35.1–46.3)
EOSINOPHIL # BLD AUTO: 0.05 X10(3) UL (ref 0–0.7)
EOSINOPHIL NFR BLD AUTO: 0.6 %
ERYTHROCYTE [DISTWIDTH] IN BLOOD BY AUTOMATED COUNT: 13 % (ref 11–15)
GLUCOSE 1H P GLC SERPL-MCNC: 146 MG/DL
HBV SURFACE AG SER-ACNC: <0.1 [IU]/L
HBV SURFACE AG SERPL QL IA: NONREACTIVE
HCT VFR BLD AUTO: 35.7 %
HCV AB SERPL QL IA: NONREACTIVE
HGB BLD-MCNC: 12 G/DL
IMM GRANULOCYTES # BLD AUTO: 0.02 X10(3) UL (ref 0–1)
IMM GRANULOCYTES NFR BLD: 0.2 %
LYMPHOCYTES # BLD AUTO: 1.42 X10(3) UL (ref 1–4)
LYMPHOCYTES NFR BLD AUTO: 17.4 %
MCH RBC QN AUTO: 27.3 PG (ref 26–34)
MCHC RBC AUTO-ENTMCNC: 33.6 G/DL (ref 31–37)
MCV RBC AUTO: 81.1 FL
MONOCYTES # BLD AUTO: 0.31 X10(3) UL (ref 0.1–1)
MONOCYTES NFR BLD AUTO: 3.8 %
NEUTROPHILS # BLD AUTO: 6.33 X10 (3) UL (ref 1.5–7.7)
NEUTROPHILS # BLD AUTO: 6.33 X10(3) UL (ref 1.5–7.7)
NEUTROPHILS NFR BLD AUTO: 77.9 %
PLATELET # BLD AUTO: 261 10(3)UL (ref 150–450)
RBC # BLD AUTO: 4.4 X10(6)UL
RH BLOOD TYPE: POSITIVE
RUBV IGG SER QL: POSITIVE
RUBV IGG SER-ACNC: 174 IU/ML (ref 10–?)
WBC # BLD AUTO: 8.1 X10(3) UL (ref 4–11)

## 2023-11-01 PROCEDURE — 82950 GLUCOSE TEST: CPT

## 2023-11-01 PROCEDURE — 87340 HEPATITIS B SURFACE AG IA: CPT

## 2023-11-01 PROCEDURE — 83021 HEMOGLOBIN CHROMOTOGRAPHY: CPT

## 2023-11-01 PROCEDURE — 36415 COLL VENOUS BLD VENIPUNCTURE: CPT

## 2023-11-01 PROCEDURE — 86901 BLOOD TYPING SEROLOGIC RH(D): CPT

## 2023-11-01 PROCEDURE — 86780 TREPONEMA PALLIDUM: CPT

## 2023-11-01 PROCEDURE — 86900 BLOOD TYPING SEROLOGIC ABO: CPT

## 2023-11-01 PROCEDURE — 85025 COMPLETE CBC W/AUTO DIFF WBC: CPT

## 2023-11-01 PROCEDURE — 87389 HIV-1 AG W/HIV-1&-2 AB AG IA: CPT

## 2023-11-01 PROCEDURE — 76802 OB US < 14 WKS ADDL FETUS: CPT

## 2023-11-01 PROCEDURE — 86762 RUBELLA ANTIBODY: CPT

## 2023-11-01 PROCEDURE — 87086 URINE CULTURE/COLONY COUNT: CPT

## 2023-11-01 PROCEDURE — 86803 HEPATITIS C AB TEST: CPT

## 2023-11-01 PROCEDURE — 86850 RBC ANTIBODY SCREEN: CPT

## 2023-11-01 PROCEDURE — 83020 HEMOGLOBIN ELECTROPHORESIS: CPT

## 2023-11-01 PROCEDURE — 76801 OB US < 14 WKS SINGLE FETUS: CPT | Performed by: OBSTETRICS & GYNECOLOGY

## 2023-11-01 NOTE — ADDENDUM NOTE
Encounter addended by: Zachery Garcia on: 11/1/2023 11:43 AM   Actions taken: Visit diagnoses modified, Charge Capture section accepted

## 2023-11-01 NOTE — TELEPHONE ENCOUNTER
Solomon Miles called. Pt was seen at [facility] today. Solomon Miles called to see if RN wanted to order prenatal labs so that pt can have them done today. RN agreed and placed labs.     1-hr GTT added

## 2023-11-02 DIAGNOSIS — R73.09 ABNORMAL GTT (GLUCOSE TOLERANCE TEST): Primary | ICD-10-CM

## 2023-11-02 NOTE — PROGRESS NOTES
Called pt informed of gtt, ordered 3 hour gtt. Per pt to do on the weekend therefore provided Central Scheduling  [phone] to schedule. Pt agrees.

## 2023-11-03 LAB — T PALLIDUM AB SER QL: NEGATIVE

## 2023-11-06 LAB
GESTATIONAL AGE > OR = 9W:: YES
TEST RESULT: NEGATIVE
TRISOMY 13 (PATAU SYNDROME): NEGATIVE
TRISOMY 18 (EDWARDS SYNDROME): NEGATIVE
TRISOMY 21 (DOWN SYNDROME): NEGATIVE

## 2023-11-08 LAB
HGB A2 MFR BLD: 3.1 % (ref 1.5–3.5)
HGB F MFR BLD: 0.7 % (ref 0–2)
HGB PNL BLD ELPH: 62.3 % (ref 95.5–100)
HGB S MFR BLD: 33.9 %

## 2023-11-10 ENCOUNTER — HOSPITAL ENCOUNTER (EMERGENCY)
Facility: HOSPITAL | Age: 33
Discharge: HOME | End: 2023-11-10
Attending: EMERGENCY MEDICINE
Payer: MEDICAID

## 2023-11-10 ENCOUNTER — APPOINTMENT (OUTPATIENT)
Dept: ULTRASOUND IMAGING | Facility: HOSPITAL | Age: 33
End: 2023-11-10
Attending: EMERGENCY MEDICINE
Payer: MEDICAID

## 2023-11-10 VITALS
DIASTOLIC BLOOD PRESSURE: 47 MMHG | OXYGEN SATURATION: 98 % | RESPIRATION RATE: 17 BRPM | HEART RATE: 74 BPM | SYSTOLIC BLOOD PRESSURE: 100 MMHG | WEIGHT: 253 LBS | BODY MASS INDEX: 42 KG/M2 | TEMPERATURE: 97 F

## 2023-11-10 DIAGNOSIS — N30.01 ACUTE CYSTITIS WITH HEMATURIA: ICD-10-CM

## 2023-11-10 DIAGNOSIS — O20.0 THREATENED ABORTION: Primary | ICD-10-CM

## 2023-11-10 LAB
ANION GAP SERPL CALC-SCNC: 7 MMOL/L (ref 0–18)
BASOPHILS # BLD AUTO: 0.01 X10(3) UL (ref 0–0.2)
BASOPHILS NFR BLD AUTO: 0.1 %
BILIRUB UR QL: NEGATIVE
BUN BLD-MCNC: 6 MG/DL (ref 9–23)
BUN/CREAT SERPL: 7.7 (ref 10–20)
CALCIUM BLD-MCNC: 9.4 MG/DL (ref 8.7–10.4)
CHLORIDE SERPL-SCNC: 105 MMOL/L (ref 98–112)
CO2 SERPL-SCNC: 24 MMOL/L (ref 21–32)
COLOR UR: YELLOW
CREAT BLD-MCNC: 0.78 MG/DL
DEPRECATED RDW RBC AUTO: 38.3 FL (ref 35.1–46.3)
EGFRCR SERPLBLD CKD-EPI 2021: 104 ML/MIN/1.73M2 (ref 60–?)
EOSINOPHIL # BLD AUTO: 0.05 X10(3) UL (ref 0–0.7)
EOSINOPHIL NFR BLD AUTO: 0.4 %
ERYTHROCYTE [DISTWIDTH] IN BLOOD BY AUTOMATED COUNT: 13.2 % (ref 11–15)
GLUCOSE BLD-MCNC: 104 MG/DL (ref 70–99)
GLUCOSE UR-MCNC: NORMAL MG/DL
HCT VFR BLD AUTO: 34.6 %
HGB BLD-MCNC: 11.9 G/DL
IMM GRANULOCYTES # BLD AUTO: 0.03 X10(3) UL (ref 0–1)
IMM GRANULOCYTES NFR BLD: 0.3 %
KETONES UR-MCNC: NEGATIVE MG/DL
LEUKOCYTE ESTERASE UR QL STRIP.AUTO: 250
LYMPHOCYTES # BLD AUTO: 1.36 X10(3) UL (ref 1–4)
LYMPHOCYTES NFR BLD AUTO: 12.2 %
MCH RBC QN AUTO: 27.7 PG (ref 26–34)
MCHC RBC AUTO-ENTMCNC: 34.4 G/DL (ref 31–37)
MCV RBC AUTO: 80.7 FL
MONOCYTES # BLD AUTO: 0.44 X10(3) UL (ref 0.1–1)
MONOCYTES NFR BLD AUTO: 3.9 %
NEUTROPHILS # BLD AUTO: 9.29 X10 (3) UL (ref 1.5–7.7)
NEUTROPHILS # BLD AUTO: 9.29 X10(3) UL (ref 1.5–7.7)
NEUTROPHILS NFR BLD AUTO: 83.1 %
NITRITE UR QL STRIP.AUTO: NEGATIVE
OSMOLALITY SERPL CALC.SUM OF ELEC: 280 MOSM/KG (ref 275–295)
PH UR: 6.5 [PH] (ref 5–8)
PLATELET # BLD AUTO: 257 10(3)UL (ref 150–450)
POTASSIUM SERPL-SCNC: 3.6 MMOL/L (ref 3.5–5.1)
PROT UR-MCNC: 20 MG/DL
RBC # BLD AUTO: 4.29 X10(6)UL
SODIUM SERPL-SCNC: 136 MMOL/L (ref 136–145)
SP GR UR STRIP: 1.02 (ref 1–1.03)
UROBILINOGEN UR STRIP-ACNC: NORMAL
WBC # BLD AUTO: 11.2 X10(3) UL (ref 4–11)

## 2023-11-10 PROCEDURE — 36415 COLL VENOUS BLD VENIPUNCTURE: CPT

## 2023-11-10 PROCEDURE — 99284 EMERGENCY DEPT VISIT MOD MDM: CPT

## 2023-11-10 PROCEDURE — 76815 OB US LIMITED FETUS(S): CPT | Performed by: EMERGENCY MEDICINE

## 2023-11-10 PROCEDURE — 87086 URINE CULTURE/COLONY COUNT: CPT | Performed by: EMERGENCY MEDICINE

## 2023-11-10 PROCEDURE — 80048 BASIC METABOLIC PNL TOTAL CA: CPT | Performed by: EMERGENCY MEDICINE

## 2023-11-10 PROCEDURE — 85025 COMPLETE CBC W/AUTO DIFF WBC: CPT | Performed by: EMERGENCY MEDICINE

## 2023-11-10 PROCEDURE — 81001 URINALYSIS AUTO W/SCOPE: CPT | Performed by: EMERGENCY MEDICINE

## 2023-11-10 RX ORDER — CEPHALEXIN 500 MG/1
500 CAPSULE ORAL 2 TIMES DAILY
Qty: 14 CAPSULE | Refills: 0 | Status: SHIPPED | OUTPATIENT
Start: 2023-11-10 | End: 2023-11-17

## 2023-11-10 NOTE — ED INITIAL ASSESSMENT (HPI)
Patient is 14 weeks pregnant with twins. Notes about 15 minutes when wiping there was some blood. No cramping. Last appointment on 11/1.

## 2023-11-20 ENCOUNTER — ROUTINE PRENATAL (OUTPATIENT)
Dept: OBGYN CLINIC | Facility: CLINIC | Age: 33
End: 2023-11-20
Payer: MEDICAID

## 2023-11-20 VITALS
HEIGHT: 65 IN | SYSTOLIC BLOOD PRESSURE: 120 MMHG | BODY MASS INDEX: 42.65 KG/M2 | WEIGHT: 256 LBS | DIASTOLIC BLOOD PRESSURE: 78 MMHG

## 2023-11-20 DIAGNOSIS — O30.039 MONOCHORIONIC DIAMNIOTIC TWIN PREGNANCY, ANTEPARTUM: Primary | ICD-10-CM

## 2023-11-20 DIAGNOSIS — E66.01 MORBID OBESITY WITH BMI OF 40.0-44.9, ADULT (HCC): ICD-10-CM

## 2023-11-20 PROBLEM — Z30.2 REQUEST FOR STERILIZATION: Status: ACTIVE | Noted: 2023-11-20

## 2023-11-20 NOTE — PROGRESS NOTES
33 y/o [id number] at 13 W with mono:di twin gestation  Was seen in ER for vaginal bleeding/uti and received abx 2 W ago. Needs AFP  Cell free DNA sent from [facility]. - normal, female fetuses x 2   Still needs to do 3 hour GTT  Considering TL and DW her that she needs to sign IDPA form later in pregnancy.

## 2023-11-28 ENCOUNTER — HOSPITAL ENCOUNTER (OUTPATIENT)
Dept: PERINATAL CARE | Facility: HOSPITAL | Age: 33
Discharge: HOME | End: 2023-11-28
Attending: OBSTETRICS & GYNECOLOGY
Payer: MEDICAID

## 2023-11-28 VITALS
HEART RATE: 97 BPM | BODY MASS INDEX: 43 KG/M2 | DIASTOLIC BLOOD PRESSURE: 74 MMHG | WEIGHT: 256 LBS | SYSTOLIC BLOOD PRESSURE: 119 MMHG

## 2023-11-28 DIAGNOSIS — O30.039 MONOCHORIONIC DIAMNIOTIC TWIN PREGNANCY, ANTEPARTUM: Chronic | ICD-10-CM

## 2023-11-28 DIAGNOSIS — E66.01 MORBID OBESITY WITH BMI OF 40.0-44.9, ADULT (HCC): Chronic | ICD-10-CM

## 2023-11-28 DIAGNOSIS — D57.3 SICKLE CELL TRAIT (HCC): ICD-10-CM

## 2023-11-28 DIAGNOSIS — O30.039 MONOCHORIONIC DIAMNIOTIC TWIN PREGNANCY, ANTEPARTUM: Primary | Chronic | ICD-10-CM

## 2023-11-28 PROCEDURE — 76816 OB US FOLLOW-UP PER FETUS: CPT | Performed by: OBSTETRICS & GYNECOLOGY

## 2023-11-28 PROCEDURE — 76820 UMBILICAL ARTERY ECHO: CPT

## 2023-11-28 PROCEDURE — 76817 TRANSVAGINAL US OBSTETRIC: CPT

## 2023-12-04 ENCOUNTER — TELEPHONE (OUTPATIENT)
Dept: OBGYN CLINIC | Facility: CLINIC | Age: 33
End: 2023-12-04

## 2023-12-13 ENCOUNTER — LAB ENCOUNTER (OUTPATIENT)
Dept: LAB | Facility: HOSPITAL | Age: 33
End: 2023-12-13
Attending: OBSTETRICS & GYNECOLOGY
Payer: MEDICAID

## 2023-12-13 ENCOUNTER — HOSPITAL ENCOUNTER (OUTPATIENT)
Dept: PERINATAL CARE | Facility: HOSPITAL | Age: 33
Discharge: HOME | End: 2023-12-13
Attending: OBSTETRICS & GYNECOLOGY
Payer: MEDICAID

## 2023-12-13 VITALS
SYSTOLIC BLOOD PRESSURE: 114 MMHG | BODY MASS INDEX: 43 KG/M2 | HEART RATE: 111 BPM | WEIGHT: 260 LBS | DIASTOLIC BLOOD PRESSURE: 69 MMHG

## 2023-12-13 DIAGNOSIS — O30.039 MONOCHORIONIC DIAMNIOTIC TWIN PREGNANCY, ANTEPARTUM: Primary | Chronic | ICD-10-CM

## 2023-12-13 DIAGNOSIS — E66.01 MORBID OBESITY WITH BMI OF 40.0-44.9, ADULT (HCC): Chronic | ICD-10-CM

## 2023-12-13 DIAGNOSIS — O30.039 MONOCHORIONIC DIAMNIOTIC TWIN PREGNANCY, ANTEPARTUM: Chronic | ICD-10-CM

## 2023-12-13 DIAGNOSIS — R73.09 ABNORMAL GLUCOSE TOLERANCE TEST: Primary | ICD-10-CM

## 2023-12-13 LAB
GLUCOSE 1H P GLC SERPL-MCNC: 155 MG/DL
GLUCOSE 2H P GLC SERPL-MCNC: 139 MG/DL
GLUCOSE 3H P GLC SERPL-MCNC: 107 MG/DL (ref 70–140)
GLUCOSE P FAST SERPL-MCNC: 70 MG/DL

## 2023-12-13 PROCEDURE — 82952 GTT-ADDED SAMPLES: CPT

## 2023-12-13 PROCEDURE — 76815 OB US LIMITED FETUS(S): CPT | Performed by: OBSTETRICS & GYNECOLOGY

## 2023-12-13 PROCEDURE — 76820 UMBILICAL ARTERY ECHO: CPT

## 2023-12-13 PROCEDURE — 36415 COLL VENOUS BLD VENIPUNCTURE: CPT

## 2023-12-13 PROCEDURE — 82951 GLUCOSE TOLERANCE TEST (GTT): CPT

## 2023-12-18 ENCOUNTER — ROUTINE PRENATAL (OUTPATIENT)
Dept: OBGYN CLINIC | Facility: CLINIC | Age: 33
End: 2023-12-18
Payer: MEDICAID

## 2023-12-18 VITALS
WEIGHT: 263 LBS | HEIGHT: 65 IN | BODY MASS INDEX: 43.82 KG/M2 | DIASTOLIC BLOOD PRESSURE: 64 MMHG | SYSTOLIC BLOOD PRESSURE: 118 MMHG

## 2023-12-18 DIAGNOSIS — O30.039 MONOCHORIONIC DIAMNIOTIC TWIN PREGNANCY, ANTEPARTUM: ICD-10-CM

## 2023-12-18 DIAGNOSIS — O09.90 SUPERVISION OF HIGH RISK PREGNANCY, ANTEPARTUM: Primary | ICD-10-CM

## 2023-12-18 PROCEDURE — 3074F SYST BP LT 130 MM HG: CPT | Performed by: OBSTETRICS & GYNECOLOGY

## 2023-12-18 PROCEDURE — 99213 OFFICE O/P EST LOW 20 MIN: CPT | Performed by: OBSTETRICS & GYNECOLOGY

## 2023-12-18 PROCEDURE — 3008F BODY MASS INDEX DOCD: CPT | Performed by: OBSTETRICS & GYNECOLOGY

## 2023-12-18 PROCEDURE — 3078F DIAST BP <80 MM HG: CPT | Performed by: OBSTETRICS & GYNECOLOGY

## 2023-12-18 NOTE — PROGRESS NOTES
34year old  with twin pregnancy at 19w2d     Feels good. Ctx: none  VB: none  LOF: non  FM + x 2      Return OB  Pre-Mir Care: UTD.   - plan for 1 hr GTT with cbc @ 24 wks EGA  - is thinking she won't get her tubes tied, annemarie if she can have vaginal delivery. Mono-di twins  - following with MFM  Weekly NST at 32 wks     Monthly growth US  Plan delivery by 37wks  TTTS checks every 2 wks beginning at 16 weeks by assessment of amniotic fluid volume and fetal bladder in both twins   Measurement of middle cerebral artery peak systolic velocity (MCA-PSV) in both fetuses at 24 weeks for early detection of twin anemia-polycythemia sequence (TAPS).     Patient Active Problem List   Diagnosis    Prenatal exposure to HIV affecting pregnancy management    Sickle cell trait ([facility] [address]    Morbid obesity with BMI of 40.0-44.9, adult [facility])    Monochorionic diamniotic twin pregnancy, antepartum    Request for sterilization       - Return to clinic in: 4 weeks    Jaxon Cunningham DO

## 2023-12-20 ENCOUNTER — TELEPHONE (OUTPATIENT)
Dept: PERINATAL CARE | Facility: HOSPITAL | Age: 33
End: 2023-12-20

## 2023-12-27 ENCOUNTER — HOSPITAL ENCOUNTER (OUTPATIENT)
Dept: PERINATAL CARE | Facility: HOSPITAL | Age: 33
Discharge: HOME | End: 2023-12-27
Attending: OBSTETRICS & GYNECOLOGY
Payer: MEDICAID

## 2023-12-27 VITALS
WEIGHT: 263 LBS | SYSTOLIC BLOOD PRESSURE: 116 MMHG | DIASTOLIC BLOOD PRESSURE: 78 MMHG | HEART RATE: 103 BPM | BODY MASS INDEX: 44 KG/M2

## 2023-12-27 DIAGNOSIS — E66.01 MORBID OBESITY WITH BMI OF 40.0-44.9, ADULT (HCC): Chronic | ICD-10-CM

## 2023-12-27 DIAGNOSIS — O30.039 MONOCHORIONIC DIAMNIOTIC TWIN PREGNANCY, ANTEPARTUM: Primary | Chronic | ICD-10-CM

## 2023-12-27 DIAGNOSIS — O30.039 MONOCHORIONIC DIAMNIOTIC TWIN PREGNANCY, ANTEPARTUM: Chronic | ICD-10-CM

## 2023-12-27 PROCEDURE — 76820 UMBILICAL ARTERY ECHO: CPT

## 2023-12-27 PROCEDURE — 76812 OB US DETAILED ADDL FETUS: CPT

## 2023-12-27 PROCEDURE — 76811 OB US DETAILED SNGL FETUS: CPT | Performed by: OBSTETRICS & GYNECOLOGY

## 2024-01-09 ENCOUNTER — HOSPITAL ENCOUNTER (OUTPATIENT)
Dept: PERINATAL CARE | Facility: HOSPITAL | Age: 34
Discharge: HOME | End: 2024-01-09
Attending: OBSTETRICS & GYNECOLOGY
Payer: MEDICAID

## 2024-01-09 VITALS
SYSTOLIC BLOOD PRESSURE: 117 MMHG | BODY MASS INDEX: 43 KG/M2 | HEART RATE: 109 BPM | WEIGHT: 260 LBS | DIASTOLIC BLOOD PRESSURE: 73 MMHG

## 2024-01-09 DIAGNOSIS — Z20.6: ICD-10-CM

## 2024-01-09 DIAGNOSIS — E66.01 MORBID OBESITY WITH BMI OF 40.0-44.9, ADULT (HCC): Chronic | ICD-10-CM

## 2024-01-09 DIAGNOSIS — O30.039 MONOCHORIONIC DIAMNIOTIC TWIN PREGNANCY, ANTEPARTUM: Chronic | ICD-10-CM

## 2024-01-09 DIAGNOSIS — O30.032 MONOCHORIONIC DIAMNIOTIC TWIN GESTATION IN SECOND TRIMESTER: Primary | ICD-10-CM

## 2024-01-09 PROCEDURE — 76827 ECHO EXAM OF FETAL HEART: CPT

## 2024-01-09 PROCEDURE — 93325 DOPPLER ECHO COLOR FLOW MAPG: CPT

## 2024-01-09 PROCEDURE — 76825 ECHO EXAM OF FETAL HEART: CPT

## 2024-01-09 PROCEDURE — 76820 UMBILICAL ARTERY ECHO: CPT

## 2024-01-09 PROCEDURE — 76815 OB US LIMITED FETUS(S): CPT

## 2024-01-09 PROCEDURE — 76825 ECHO EXAM OF FETAL HEART: CPT | Performed by: OBSTETRICS & GYNECOLOGY

## 2024-01-09 NOTE — PROGRESS NOTES
Outpatient Maternal-Fetal Medicine Follow-Up    Dear Dr. Kurtz    Thank you for requesting ultrasound evaluation and maternal fetal medicine consultation on your patient [place]ley [place].  As you are aware she is a 33 year old female  with a twin pregnancy and an Estimated Date of Delivery: 24.  She returned to maternal-fetal medicine today for a follow-up visit.  Her history was reviewed from her prior visit and there were no interval changes.    No new changes.  FOB is HIV positive with undectable VL.  They use condoms for intercourse.    Antepartum Risk Factors  Diamniotic, MONOchorionic placentation   Obesity BMI 42  FOB is HIV positive with undectable viral load.    PHYSICAL EXAMINATION:  /73   Pulse 109   Wt 260 lb (117.9 kg)   LMP 2023 (Exact Date)   BMI 43.27 kg/m²     General: alert and oriented, no acute distress  Abdomen: gravid, soft, non-tender      OBSTETRIC ULTRASOUND    FETAL ECHOCARDIOGRAM:  Twin A - IUP in cephalic left presentation.    Placenta is posterior, high.   Cardiac activity is present, 143 bpm   MVP is 6.9 cm.   Bladder visualized.  Twin A: A transabdominal 2D Doppler, fetal echocardiogram is performed. There is a four-chamber heart of appropriate cardiac dimensions. There is situs solitus with levocardia. Intracardiac connection appear to be normal.  The  artrioventricular valves appear normal. There is no evidence of pericardial or pleural effusion. The A-V conduction  is one to one and the heart rate is appropriate for gestational age. No evidence of fetal arrhythmias is seen during today's study. There is a right to left shunting across the patent manny ovale. There is a normal appearance of the aorta and branching pattern of the head vessels.    There appears to be a structurally  normal fetal heart and rhythm. The patient was made aware of the limitations of the fetal heart study: malformations such as but not limiting to minor valve , VSD, anomalous  pulmonary venus return, or coarctation of the aorta maybe missed. I discussed the results with the patient.     Twin B - IUP in breech right presentation.   Placenta is posterior, high. Velamentous insertion  Cardiac activity is present, 151 bpm  MVP is 5 cm.   Bladder visualised.  Twin B: A transabdominal 2D Doppler, fetal echocardiogram is performed. There is a four-chamber heart of appropriate cardiac dimensions. There is situs solitus with levocardia. Intracardiac connection appear to be normal.  The  artrioventricular valves appear normal. There is no evidence of pericardial or pleural effusion. The A-V conduction is one to one and the heart rate is appropriate for gestational age. No evidence of fetal arrhythmias is seen during today's study. There is a right to left shunting across the patent manny ovale. There is a normal appearance of the aorta and branching pattern of the head vessels.    There appears to be a structurally  normal fetal heart and rhythm. The patient was made aware of the limitations of the fetal heart study: malformations such as but not limiting to minor valve , VSD, anomalous pulmonary venus return, or coarctation of the aorta maybe missed. I discussed the results with the patient.    See PACS/Imaging Tab For Complete Ultrasound Report  I interpreted the results and reviewed them with the patient.    ---------------------------------------------------  DISCUSSION  During her visit we discussed and reviewed the following issues:  MONOCHORIONIC / DIAMNIOTIC TWIN GESTATION - FOLLOW-UP  No sonographic signs of twin twin transfusion syndrome evident on today's scan.  Continued surveillance every other week is advised.     OBESITY  See prior [facility] notes for a detailed review.    IMPRESSION:   Twin IUP at 22w3d   Diamniotic, MONOchorionic placentation -No signs of TTTS  Obesity BMI 42  TWIN B: Velamentous Umbilical cord insertion  FOB-HIV positive with undectable levels     RECOMMENDATIONS:    Weekly NST at 32 wks     Monthly growth US  Plan delivery by 37wks  TTTS checks every 2 wks beginning at 16 weeks by assessment of amniotic fluid volume and fetal bladder in both twins   Measurement of middle cerebral artery peak systolic velocity (MCA-PSV) in both fetuses at 24 weeks for early detection of twin anemia-polycythemia sequence (TAPS).  Aspirin  mg daily    Thank you for allowing me to participate in the care of your patient.  Please do not hesitate to contact me if additional questions or concerns arise.      Alma Whitfield MD   Maternal Fetal Medicine     20  minutes spent in review of records, patient consultation, documentation and coordination of care.  The relevant clinical matter(s) are summarized above.     Note to patient and family  The 21st Century Cures Act makes medical notes available to patients in the interest of transparency.  However, please be advised that this is a medical document.  It is intended as [id number] communication.  It is written and medical language may contain abbreviations or verbiage that are technical and unfamiliar.  It may appear blunt or direct.  Medical documents are intended to carry relevant information, facts as evident, and the clinical opinion of the practitioner.

## 2024-01-11 ENCOUNTER — TELEPHONE (OUTPATIENT)
Dept: ADMINISTRATIVE | Age: 34
End: 2024-01-11

## 2024-01-11 NOTE — TELEPHONE ENCOUNTER
Hello,    Please provide CPT and DX codes for this request.  PLEASE DISREGARD!    Thank you    Kylie BARBOSA  Referral Specialist  Centralized Prior Authorizations/Referrals  [facility]    
What Type Of Note Output Would You Prefer (Optional)?: Standard Output
How Did Your Itching Occur?: sudden in onset (over a period of weeks to a few months)
How Severe Is Your Itching?: mild

## 2024-01-17 ENCOUNTER — HOSPITAL ENCOUNTER (EMERGENCY)
Facility: HOSPITAL | Age: 34
Discharge: HOME | End: 2024-01-17
Attending: EMERGENCY MEDICINE
Payer: MEDICAID

## 2024-01-17 ENCOUNTER — APPOINTMENT (OUTPATIENT)
Dept: GENERAL RADIOLOGY | Facility: HOSPITAL | Age: 34
End: 2024-01-17
Attending: EMERGENCY MEDICINE
Payer: MEDICAID

## 2024-01-17 VITALS
TEMPERATURE: 98 F | HEART RATE: 99 BPM | BODY MASS INDEX: 43.49 KG/M2 | RESPIRATION RATE: 22 BRPM | WEIGHT: 261 LBS | DIASTOLIC BLOOD PRESSURE: 82 MMHG | SYSTOLIC BLOOD PRESSURE: 122 MMHG | HEIGHT: 65 IN | OXYGEN SATURATION: 100 %

## 2024-01-17 DIAGNOSIS — J40 BRONCHITIS: Primary | ICD-10-CM

## 2024-01-17 LAB
ANION GAP SERPL CALC-SCNC: 7 MMOL/L (ref 0–18)
ATRIAL RATE: 108 BPM
BASOPHILS # BLD AUTO: 0.01 X10(3) UL (ref 0–0.2)
BASOPHILS NFR BLD AUTO: 0.1 %
BUN BLD-MCNC: <5 MG/DL (ref 9–23)
CALCIUM BLD-MCNC: 8.9 MG/DL (ref 8.7–10.4)
CHLORIDE SERPL-SCNC: 108 MMOL/L (ref 98–112)
CO2 SERPL-SCNC: 24 MMOL/L (ref 21–32)
CREAT BLD-MCNC: 0.61 MG/DL
DEPRECATED RDW RBC AUTO: 39.9 FL (ref 35.1–46.3)
EGFRCR SERPLBLD CKD-EPI 2021: 123 ML/MIN/1.73M2 (ref 60–?)
EOSINOPHIL # BLD AUTO: 0.07 X10(3) UL (ref 0–0.7)
EOSINOPHIL NFR BLD AUTO: 0.8 %
ERYTHROCYTE [DISTWIDTH] IN BLOOD BY AUTOMATED COUNT: 13.2 % (ref 11–15)
FLUAV + FLUBV RNA SPEC NAA+PROBE: NEGATIVE
FLUAV + FLUBV RNA SPEC NAA+PROBE: NEGATIVE
GLUCOSE BLD-MCNC: 105 MG/DL (ref 70–99)
HCT VFR BLD AUTO: 32.4 %
HGB BLD-MCNC: 11.3 G/DL
IMM GRANULOCYTES # BLD AUTO: 0.02 X10(3) UL (ref 0–1)
IMM GRANULOCYTES NFR BLD: 0.2 %
LYMPHOCYTES # BLD AUTO: 1.2 X10(3) UL (ref 1–4)
LYMPHOCYTES NFR BLD AUTO: 14.3 %
MCH RBC QN AUTO: 29.1 PG (ref 26–34)
MCHC RBC AUTO-ENTMCNC: 34.9 G/DL (ref 31–37)
MCV RBC AUTO: 83.5 FL
MONOCYTES # BLD AUTO: 0.45 X10(3) UL (ref 0.1–1)
MONOCYTES NFR BLD AUTO: 5.4 %
NEUTROPHILS # BLD AUTO: 6.62 X10 (3) UL (ref 1.5–7.7)
NEUTROPHILS # BLD AUTO: 6.62 X10(3) UL (ref 1.5–7.7)
NEUTROPHILS NFR BLD AUTO: 79.2 %
P AXIS: 45 DEGREES
P-R INTERVAL: 144 MS
PLATELET # BLD AUTO: 231 10(3)UL (ref 150–450)
POTASSIUM SERPL-SCNC: 4.4 MMOL/L (ref 3.5–5.1)
Q-T INTERVAL: 356 MS
QRS DURATION: 68 MS
QTC CALCULATION (BEZET): 477 MS
R AXIS: 36 DEGREES
RBC # BLD AUTO: 3.88 X10(6)UL
RSV RNA SPEC NAA+PROBE: NEGATIVE
SARS-COV-2 RNA RESP QL NAA+PROBE: NOT DETECTED
SODIUM SERPL-SCNC: 139 MMOL/L (ref 136–145)
T AXIS: 32 DEGREES
VENTRICULAR RATE: 108 BPM
WBC # BLD AUTO: 8.4 X10(3) UL (ref 4–11)

## 2024-01-17 PROCEDURE — 80048 BASIC METABOLIC PNL TOTAL CA: CPT | Performed by: EMERGENCY MEDICINE

## 2024-01-17 PROCEDURE — 99285 EMERGENCY DEPT VISIT HI MDM: CPT

## 2024-01-17 PROCEDURE — 93005 ELECTROCARDIOGRAM TRACING: CPT

## 2024-01-17 PROCEDURE — 0241U SARS-COV-2/FLU A AND B/RSV BY PCR (GENEXPERT): CPT | Performed by: EMERGENCY MEDICINE

## 2024-01-17 PROCEDURE — 99284 EMERGENCY DEPT VISIT MOD MDM: CPT

## 2024-01-17 PROCEDURE — 93010 ELECTROCARDIOGRAM REPORT: CPT

## 2024-01-17 PROCEDURE — 85025 COMPLETE CBC W/AUTO DIFF WBC: CPT | Performed by: EMERGENCY MEDICINE

## 2024-01-17 PROCEDURE — 71045 X-RAY EXAM CHEST 1 VIEW: CPT | Performed by: EMERGENCY MEDICINE

## 2024-01-17 PROCEDURE — 96361 HYDRATE IV INFUSION ADD-ON: CPT

## 2024-01-17 PROCEDURE — 96360 HYDRATION IV INFUSION INIT: CPT

## 2024-01-17 RX ORDER — IPRATROPIUM BROMIDE AND ALBUTEROL SULFATE 2.5; .5 MG/3ML; MG/3ML
3 SOLUTION RESPIRATORY (INHALATION) ONCE
Status: DISCONTINUED | OUTPATIENT
Start: 2024-01-17 | End: 2024-01-17

## 2024-01-17 NOTE — ED PROVIDER NOTES
Patient Seen in: [facility] Emergency Department      History     Chief Complaint   Patient presents with    Cough/URI     Stated Complaint: coughing x2-3 days. blood in mucus    Subjective:   HPI    33-year-old pregnant female (23-4/7 weeks) with twin gestation presents with complaints of cough productive of blood-tinged sputum along with nasal congestion and increased mucus to her nose and throat.  She reports symptoms over the past 3 days.  She denies associated chest pain.  No known fevers.  She denies dyspnea.    Objective:   Past Medical History:   Diagnosis Date    Chicken pox     Childhood    Sickle cell trait (HCC)               Past Surgical History:   Procedure Laterality Date    WISDOM TEETH REMOVED      2013                Social History     Socioeconomic History    Marital status: Single     Spouse name: Angus Farah    Number of children: 3    Years of education: 16   Occupational History    Occupation:      Comment: Full time    Occupation: RN     Comment: [employer]   Tobacco Use    Smoking status: Never    Smokeless tobacco: Never   Vaping Use    Vaping Use: Never used   Substance and Sexual Activity    Alcohol use: Yes     Alcohol/week: 1.0 standard drink of alcohol     Types: 1 Standard drinks or equivalent per week    Drug use: No   Other Topics Concern     Service No    Blood Transfusions No    Caffeine Concern No    Occupational Exposure No    Hobby Hazards No    Sleep Concern No    Stress Concern No    Weight Concern No    Special Diet No    Back Care No    Exercise No    Bike Helmet No    Seat Belt Yes    Self-Exams No     Social Determinants of Health     Financial Resource Strain: Low Risk  (3/31/2023)    Financial Resource Strain     Difficulty of Paying Living Expenses: Not hard at all     Med Affordability: No   Food Insecurity: No Food Insecurity (3/31/2023)    Food Insecurity     Food Insecurity: Never true   Transportation Needs: No  Transportation Needs (3/31/2023)    Transportation Needs     Lack of Transportation: No   Stress: No Stress Concern Present (3/31/2023)    Stress     Feeling of Stress : No   Housing Stability: Low Risk  (3/31/2023)    Housing Stability     Housing Instability: No              Review of Systems    Positive for stated complaint: coughing x2-3 days. blood in mucus  Other systems are as noted in HPI.  Constitutional and vital signs reviewed.      All other systems reviewed and negative except as noted above.    Physical Exam     ED Triage Vitals [01/17/24 0903]   /77   Pulse 113   Resp 22   Temp 97.6 °F (36.4 °C)   Temp src Temporal   SpO2 99 %   O2 Device None (Room air)       Current:/72   Pulse 96   Temp 97.6 °F (36.4 °C) (Temporal)   Resp 22   Ht 165.1 cm (5' 5\")   Wt 118.4 kg   LMP 08/05/2023 (Exact Date)   SpO2 96%   Breastfeeding No   BMI 43.43 kg/m²         Physical Exam      General Appearance: alert, no distress  Eyes: pupils equal and round no pallor or injection  ENT, Mouth: mucous membranes moist.  Bilateral TMs and auditory canals normal-appearing.  Oropharynx normal-appearing.  Respiratory: there are no retractions, lungs are clear to auscultation  Cardiovascular: Slightly tachycardic, regular rhythm  Gastrointestinal:  abdomen is gravid and non tender, no masses, bowel sounds normal  Neurological: Speech normal.  Moving extremities x 4.  Skin: warm and dry, no rashes.  Musculoskeletal: neck is supple non tender        Extremities are symmetrical, full range of motion  Psychiatric: patient is oriented X 3, there is no agitation    DIFFERENTIAL DIAGNOSIS: After history and physical exam differential diagnosis was considered for bronchitis, pneumonia, viral respiratory illness, or other        ED Course     Labs Reviewed   BASIC METABOLIC PANEL (8) - Abnormal; Notable for the following components:       Result Value    Glucose 105 (*)     BUN <5 (*)     All other components within  normal limits   CBC W/ DIFFERENTIAL - Abnormal; Notable for the following components:    HGB 11.3 (*)     HCT 32.4 (*)     All other components within normal limits   SARS-COV-2/FLU A AND B/RSV BY PCR (GENEXPERT) - Normal    Narrative:     This test is intended for the qualitative detection and differentiation of SARS-CoV-2, influenza A, influenza B, and respiratory syncytial virus (RSV) viral RNA in nasopharyngeal or nares swabs from individuals suspected of respiratory viral infection consistent with COVID-19 by their healthcare provider. Signs and symptoms of respiratory viral infection due to SARS-CoV-2, influenza, and RSV can be similar.    Test performed using the Xpert Xpress SARS-CoV-2/FLU/RSV (real time RT-PCR)  assay on the [employer] instrument, [place]one, [place] [address].   This test is being used under the Food and Drug Administration's Emergency Use Authorization.    The authorized Fact Sheet for Healthcare Providers for this assay is available upon request from the laboratory.   CBC WITH DIFFERENTIAL WITH PLATELET    Narrative:     The following orders were created for panel order CBC With Differential With Platelet.  Procedure                               Abnormality         Status                     ---------                               -----------         ------                     CBC W/ DIFFERENTIAL[[id number]]          Abnormal            Final result                 Please view results for these tests on the individual orders.     EKG    Rate, intervals and axes as noted on EKG Report.  Rate: 108  Rhythm: Sinus Rhythm with premature supraventricular complexes  Axis: Normal  Reading: Nonspecific EKG                        MDM      Chest x-ray was reviewed independently me.  No pneumonia or pneumothorax noted.  Lab results noted.  Suspect bronchitis.  Patient comfortable and stable throughout ED stay.  O2 saturation 98% on room air.  Discussed with Dr. Winslow, covering for the patient's  OB/GYN.  He requests that fetal heart tones be assessed by Doppler and, if normal, the patient can go home without additional fetal monitoring.  Fetal heart tones easily heard by Doppler.  Fetus A-156, fetus B-158.  She is advised to take Tylenol as needed for pain or fever.  She is advised to return if worsening symptoms develop.                                   Medical Decision Making      Disposition and Plan     Clinical Impression:  1. Bronchitis         Disposition:  Discharge  1/17/2024 11:56 am    Follow-up:  Peace Alexander MD  [address] [place] [id number] [phone]    Follow up      Jessie Kurtz,   [address] [place] [address] [id number] [phone]    Follow up            Medications Prescribed:  Current Discharge Medication List

## 2024-01-17 NOTE — ED INITIAL ASSESSMENT (HPI)
Patient to ED for c/o coughing, dry throat, and chest tightness for approx. 4 days. States that she noticed that she started coughing up bright red, bloody mucus. Some dyspnea on exertion.

## 2024-01-23 ENCOUNTER — HOSPITAL ENCOUNTER (OUTPATIENT)
Dept: PERINATAL CARE | Facility: HOSPITAL | Age: 34
Discharge: HOME | End: 2024-01-23
Attending: OBSTETRICS & GYNECOLOGY
Payer: MEDICAID

## 2024-01-23 VITALS
DIASTOLIC BLOOD PRESSURE: 75 MMHG | SYSTOLIC BLOOD PRESSURE: 122 MMHG | BODY MASS INDEX: 43 KG/M2 | HEART RATE: 118 BPM | WEIGHT: 261 LBS

## 2024-01-23 DIAGNOSIS — E66.01 MORBID OBESITY WITH BMI OF 40.0-44.9, ADULT (HCC): Chronic | ICD-10-CM

## 2024-01-23 DIAGNOSIS — O30.039 MONOCHORIONIC DIAMNIOTIC TWIN PREGNANCY, ANTEPARTUM: Primary | Chronic | ICD-10-CM

## 2024-01-23 DIAGNOSIS — D57.3 SICKLE CELL TRAIT (HCC): ICD-10-CM

## 2024-01-23 DIAGNOSIS — O30.039 MONOCHORIONIC DIAMNIOTIC TWIN PREGNANCY, ANTEPARTUM: Chronic | ICD-10-CM

## 2024-01-23 PROCEDURE — 76816 OB US FOLLOW-UP PER FETUS: CPT | Performed by: OBSTETRICS & GYNECOLOGY

## 2024-01-23 PROCEDURE — 76816 OB US FOLLOW-UP PER FETUS: CPT

## 2024-01-23 PROCEDURE — 76820 UMBILICAL ARTERY ECHO: CPT

## 2024-01-23 PROCEDURE — 76821 MIDDLE CEREBRAL ARTERY ECHO: CPT

## 2024-01-23 NOTE — PROGRESS NOTES
Outpatient Maternal-Fetal Medicine Follow-Up     Dear Dr. Kurtz     Thank you for requesting ultrasound evaluation and maternal fetal medicine consultation on your patient Ansley [place].  As you are aware she is a 33 year old female  with a twin pregnancy and an Estimated Date of Delivery: 24.  She returned to maternal-fetal medicine today for a follow-up visit.  Her history was reviewed from her prior visit and there were no interval changes.     Antepartum Risk Factors  Diamniotic, MONOchorionic placentation   Obesity BMI 42     PHYSICAL EXAMINATION:  /75   Pulse 118   Wt 261 lb (118.4 kg)   LMP 2023 (Exact Date)   BMI 43.43 kg/m²     General: alert and oriented, no acute distress  Abdomen: gravid, soft, non-tender  Extremities: non-tender, no edema          DISCUSSION  During her visit we discussed and reviewed the following issues:  MONOCHORIONIC / DIAMNIOTIC TWIN GESTATION - FOLLOW-UP  No sonographic signs of twin twin transfusion syndrome evident on today's scan.  Continued surveillance every other week is advised.      OBESITY  See prior MFM notes for a detailed review.         OB ULTRASOUND REPORT   See imaging tab for complete ultrasound report or in PACS    Twin A -   IUP in cephalic left presentation.    Placenta is posterior.   Cardiac activity is present 159 bpm   g ( 1 lb 11 oz); 57%.   MVP is 5.8 cm.   UA Doppler 3.17. MCA Doppler PSV 1.52        Twin B -   IUP in cephalic right presentation.   Placenta is posterior.   Cardiac activity is present 155 bpm   g (1 lb 9 oz); 47%.   MVP is 4.7 cm.   UA Doppler 4.51 . MCA Doppler PSV 1.53        Discordance - 8.2 %         IMPRESSION:   Twin IUP at 24w3d   Diamniotic, MONOchorionic placentation   Obesity BMI 42  TWIN B: Velamentous Umbilical cord insertion twin B     RECOMMENDATIONS:   Weekly NST at 32 wks     Monthly growth US  Plan delivery by 37wks  TTTS checks every 2 wks by assessment of amniotic fluid  volume and fetal bladder in both twins   Measurement of middle cerebral artery peak systolic velocity (MCA-PSV) in both fetuses at 24 weeks for early detection of twin anemia-polycythemia sequence (TAPS).  Fetal echocardiogram at 22-24wks  Aspirin  mg daily     Thank you for allowing me to participate in the care of your patient.  Please do not hesitate to contact me if additional questions or concerns arise.       Bubba Ronquillo D.O.  Maternal Fetal Medicine     35  minutes spent in review of records, patient consultation, documentation and coordination of care.  The relevant clinical matter(s) are summarized above.      Note to patient and family  The 21st Century Cures Act makes medical notes available to patients in the interest of transparency.  However, please be advised that this is a medical document.  It is intended as szbs-ef-abbx communication.  It is written and medical language may contain abbreviations or verbiage that are technical and unfamiliar.  It may appear blunt or direct.  Medical documents are intended to carry relevant information, facts as evident, and the clinical opinion of the practitioner.

## 2024-01-26 ENCOUNTER — ROUTINE PRENATAL (OUTPATIENT)
Dept: OBGYN CLINIC | Facility: CLINIC | Age: 34
End: 2024-01-26
Payer: MEDICAID

## 2024-01-26 VITALS
HEIGHT: 65 IN | BODY MASS INDEX: 44.15 KG/M2 | DIASTOLIC BLOOD PRESSURE: 64 MMHG | WEIGHT: 265 LBS | SYSTOLIC BLOOD PRESSURE: 102 MMHG

## 2024-01-26 DIAGNOSIS — O09.90 SUPERVISION OF HIGH RISK PREGNANCY, ANTEPARTUM: Primary | ICD-10-CM

## 2024-01-26 DIAGNOSIS — E66.01 MORBID OBESITY WITH BMI OF 40.0-44.9, ADULT (HCC): ICD-10-CM

## 2024-01-26 DIAGNOSIS — O30.039 MONOCHORIONIC DIAMNIOTIC TWIN PREGNANCY, ANTEPARTUM: ICD-10-CM

## 2024-01-26 PROCEDURE — 3074F SYST BP LT 130 MM HG: CPT | Performed by: OBSTETRICS & GYNECOLOGY

## 2024-01-26 PROCEDURE — 99213 OFFICE O/P EST LOW 20 MIN: CPT | Performed by: OBSTETRICS & GYNECOLOGY

## 2024-01-26 PROCEDURE — 3078F DIAST BP <80 MM HG: CPT | Performed by: OBSTETRICS & GYNECOLOGY

## 2024-01-26 PROCEDURE — 3008F BODY MASS INDEX DOCD: CPT | Performed by: OBSTETRICS & GYNECOLOGY

## 2024-01-26 NOTE — PROGRESS NOTES
33 year old  with twin pregnancy at 24w6d     Feels good.  Ctx: none  VB: none  LOF: non  FM + x 2      Return OB  Pre-Mir Care: UTD.   - will repeat 3 hr GTT next week  - is thinking she won't get her tubes tied, annemarie if she can have vaginal delivery.    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

## 2024-01-30 ENCOUNTER — LAB ENCOUNTER (OUTPATIENT)
Dept: LAB | Facility: HOSPITAL | Age: 34
End: 2024-01-30
Attending: OBSTETRICS & GYNECOLOGY
Payer: MEDICAID

## 2024-01-30 DIAGNOSIS — O09.90 SUPERVISION OF HIGH RISK PREGNANCY, ANTEPARTUM: ICD-10-CM

## 2024-01-30 DIAGNOSIS — R73.09 ABNORMAL GTT (GLUCOSE TOLERANCE TEST): Primary | ICD-10-CM

## 2024-01-30 LAB
BASOPHILS # BLD AUTO: 0.01 X10(3) UL (ref 0–0.2)
BASOPHILS NFR BLD AUTO: 0.1 %
DEPRECATED RDW RBC AUTO: 38.5 FL (ref 35.1–46.3)
EOSINOPHIL # BLD AUTO: 0.03 X10(3) UL (ref 0–0.7)
EOSINOPHIL NFR BLD AUTO: 0.3 %
ERYTHROCYTE [DISTWIDTH] IN BLOOD BY AUTOMATED COUNT: 12.6 % (ref 11–15)
GLUCOSE 1H P GLC SERPL-MCNC: 157 MG/DL
GLUCOSE 2H P GLC SERPL-MCNC: 146 MG/DL
GLUCOSE 3H P GLC SERPL-MCNC: 117 MG/DL (ref 70–140)
GLUCOSE P FAST SERPL-MCNC: 73 MG/DL
HCT VFR BLD AUTO: 32.7 %
HGB BLD-MCNC: 11 G/DL
IMM GRANULOCYTES # BLD AUTO: 0.03 X10(3) UL (ref 0–1)
IMM GRANULOCYTES NFR BLD: 0.3 %
LYMPHOCYTES # BLD AUTO: 1.24 X10(3) UL (ref 1–4)
LYMPHOCYTES NFR BLD AUTO: 14.4 %
MCH RBC QN AUTO: 27.8 PG (ref 26–34)
MCHC RBC AUTO-ENTMCNC: 33.6 G/DL (ref 31–37)
MCV RBC AUTO: 82.8 FL
MONOCYTES # BLD AUTO: 0.51 X10(3) UL (ref 0.1–1)
MONOCYTES NFR BLD AUTO: 5.9 %
NEUTROPHILS # BLD AUTO: 6.79 X10 (3) UL (ref 1.5–7.7)
NEUTROPHILS # BLD AUTO: 6.79 X10(3) UL (ref 1.5–7.7)
NEUTROPHILS NFR BLD AUTO: 79 %
PLATELET # BLD AUTO: 247 10(3)UL (ref 150–450)
RBC # BLD AUTO: 3.95 X10(6)UL
WBC # BLD AUTO: 8.6 X10(3) UL (ref 4–11)

## 2024-01-30 PROCEDURE — 82951 GLUCOSE TOLERANCE TEST (GTT): CPT

## 2024-01-30 PROCEDURE — 85025 COMPLETE CBC W/AUTO DIFF WBC: CPT

## 2024-01-30 PROCEDURE — 82952 GTT-ADDED SAMPLES: CPT

## 2024-01-30 PROCEDURE — 36415 COLL VENOUS BLD VENIPUNCTURE: CPT

## 2024-02-06 ENCOUNTER — HOSPITAL ENCOUNTER (OUTPATIENT)
Dept: PERINATAL CARE | Facility: HOSPITAL | Age: 34
Discharge: HOME | End: 2024-02-06
Attending: OBSTETRICS & GYNECOLOGY
Payer: MEDICAID

## 2024-02-06 VITALS
SYSTOLIC BLOOD PRESSURE: 120 MMHG | HEART RATE: 116 BPM | BODY MASS INDEX: 44 KG/M2 | WEIGHT: 264 LBS | DIASTOLIC BLOOD PRESSURE: 76 MMHG

## 2024-02-06 DIAGNOSIS — O99.212 MATERNAL MORBID OBESITY IN SECOND TRIMESTER, ANTEPARTUM (HCC): ICD-10-CM

## 2024-02-06 DIAGNOSIS — O30.032 MONOCHORIONIC DIAMNIOTIC TWIN GESTATION IN SECOND TRIMESTER: Primary | ICD-10-CM

## 2024-02-06 DIAGNOSIS — O30.039 MONOCHORIONIC DIAMNIOTIC TWIN PREGNANCY, ANTEPARTUM: Chronic | ICD-10-CM

## 2024-02-06 DIAGNOSIS — D57.3 SICKLE CELL TRAIT (HCC): ICD-10-CM

## 2024-02-06 DIAGNOSIS — E66.01 MATERNAL MORBID OBESITY IN SECOND TRIMESTER, ANTEPARTUM (HCC): ICD-10-CM

## 2024-02-06 DIAGNOSIS — E66.01 MORBID OBESITY WITH BMI OF 40.0-44.9, ADULT (HCC): Chronic | ICD-10-CM

## 2024-02-06 PROCEDURE — 76815 OB US LIMITED FETUS(S): CPT | Performed by: OBSTETRICS & GYNECOLOGY

## 2024-02-06 PROCEDURE — 76816 OB US FOLLOW-UP PER FETUS: CPT

## 2024-02-06 PROCEDURE — 76820 UMBILICAL ARTERY ECHO: CPT

## 2024-02-06 PROCEDURE — 76821 MIDDLE CEREBRAL ARTERY ECHO: CPT

## 2024-02-06 NOTE — PROGRESS NOTES
Outpatient Maternal-Fetal Medicine Follow-Up     Dear Dr. Kurtz     Thank you for requesting ultrasound evaluation and maternal fetal medicine consultation on your patient Ansley [place].  As you are aware she is a 33 year old female  with a twin pregnancy and an Estimated Date of Delivery: 24.  She returned to maternal-fetal medicine today for a follow-up visit.  Her history was reviewed from her prior visit and there were no interval changes.     Fetal movement X 2  Antepartum Risk Factors  Diamniotic, MONOchorionic placentation   Obesity BMI 42     PHYSICAL EXAMINATION:  /76   Pulse 116   Wt 264 lb (119.7 kg)   LMP 2023 (Exact Date)   BMI 43.93 kg/m²     General: alert and oriented, no acute distress  Abdomen: gravid, soft, non-tender            DISCUSSION  During her visit we discussed and reviewed the following issues:  MONOCHORIONIC / DIAMNIOTIC TWIN GESTATION - FOLLOW-UP  No sonographic signs of twin twin transfusion syndrome evident on today's scan.  Continued surveillance every other week is advised.      OBESITY  See prior M notes for a detailed review.       GLUCOSE 1HR OB   Date Value Ref Range Status   2023 146 (H) See comment mg/dL Final     Comment:     If the plasma glucose level measured after 1 hour is >=130, 135 or 140 mg/dl proceed to \"Glucose Tolerance, 100 gm (0 hr, 1 hr, 2hr, 3hr), Gestational (ADA)\" test on a separate day, as clinically indicated.       2023 114 See comment mg/dL Final     Comment:     If the plasma glucose level measured after 1 hour is >=130, 135 or 140 mg/dl proceed to \"Glucose Tolerance, 100 gm (0 hr, 1 hr, 2hr, 3hr), Gestational (ADA)\" test on a separate day, as clinically indicated.          Lab Results   Component Value Date    GLUFG 73 2024    GLU1G 157 2024    GLU2G 146 2024    GLU3G 117 2024      OB ULTRASOUND REPORT   See imaging tab for complete ultrasound report or in PACS  Ultrasound  Findings:  Mono/Di twins    Baby A in cephalic presentation.    Placenta is posterior.   A 3 vessel cord is noted.  Cardiac activity is present at 152 bpm   g ( 2 lb 2 oz); 52%.   MVP is 5.1 cm .  Umbilical Artery Doppler is 4.35. MCA PSV is 0.92 MOM    Baby B in transverse position.  Placenta is posterior.  A 3 vessel velamentous cord is seen.  Cardiac activity is present at 155 bpm.   g (2 lb 1 oz); 51%.   MVP is 4.9cm.  Umbilical artery doppler is 4.65. MCA is 1.02 MOM.    Discordance - 1 %        IMPRESSION:   Twin IUP at  26w3d   Diamniotic, MONOchorionic placentation   Obesity BMI 42  TWIN B: Velamentous Umbilical cord insertion twin B     RECOMMENDATIONS:   Weekly NST at 32 wks     Monthly growth US with BPP at or beyond 32 weeks  Plan delivery by 37wks  TTTS checks every 2 wks by assessment of amniotic fluid volume and fetal bladder in both twins   Measurement of middle cerebral artery peak systolic velocity (MCA-PSV) in both fetuses at 24 weeks for early detection of twin anemia-polycythemia sequence (TAPS).  Aspirin  mg daily     Thank you for allowing me to participate in the care of your patient.  Please do not hesitate to contact me if additional questions or concerns arise.       Alma Whitfield MD   Maternal Fetal Medicine     30 minutes spent in review of records, patient consultation, documentation and coordination of care.  The relevant clinical matter(s) are summarized above.      Note to patient and family  The 21st Century Cures Act makes medical notes available to patients in the interest of transparency.  However, please be advised that this is a medical document.  It is intended as kabz-ww-[id number] communication.  It is written and medical language may contain abbreviations or verbiage that are technical and unfamiliar.  It may appear blunt or direct.  Medical documents are intended to carry relevant information, facts as evident, and the clinical opinion of the practitioner.

## 2024-02-20 ENCOUNTER — HOSPITAL ENCOUNTER (OUTPATIENT)
Dept: PERINATAL CARE | Facility: HOSPITAL | Age: 34
Discharge: HOME | End: 2024-02-20
Attending: OBSTETRICS & GYNECOLOGY
Payer: MEDICAID

## 2024-02-20 VITALS
WEIGHT: 265 LBS | HEART RATE: 105 BPM | SYSTOLIC BLOOD PRESSURE: 121 MMHG | BODY MASS INDEX: 44 KG/M2 | DIASTOLIC BLOOD PRESSURE: 56 MMHG

## 2024-02-20 DIAGNOSIS — O30.039 MONOCHORIONIC DIAMNIOTIC TWIN PREGNANCY, ANTEPARTUM (HCC): Primary | Chronic | ICD-10-CM

## 2024-02-20 DIAGNOSIS — E66.01 MORBID OBESITY WITH BMI OF 40.0-44.9, ADULT (HCC): Chronic | ICD-10-CM

## 2024-02-20 DIAGNOSIS — O30.039 MONOCHORIONIC DIAMNIOTIC TWIN PREGNANCY, ANTEPARTUM (HCC): Chronic | ICD-10-CM

## 2024-02-20 PROCEDURE — 76816 OB US FOLLOW-UP PER FETUS: CPT | Performed by: OBSTETRICS & GYNECOLOGY

## 2024-02-20 NOTE — PROGRESS NOTES
Outpatient Maternal-Fetal Medicine Follow-Up     Dear Dr. Kurtz     Thank you for requesting ultrasound evaluation and maternal fetal medicine consultation on your patient Ansley [place].  As you are aware she is a 33 year old female  with a twin pregnancy and an Estimated Date of Delivery: 24.  She returned to maternal-fetal medicine today for a follow-up visit.  Her history was reviewed from her prior visit and there were no interval changes.     Fetal movement X 2  Antepartum Risk Factors  Diamniotic, MONOchorionic placentation   Obesity BMI 42     PHYSICAL EXAMINATION:  /56   Pulse 105   Wt 265 lb (120.2 kg)   LMP 2023 (Exact Date)   BMI 44.10 kg/m²     General: alert and oriented, no acute distress  Abdomen: gravid, soft, non-tender            DISCUSSION  During her visit we discussed and reviewed the following issues:  MONOCHORIONIC / DIAMNIOTIC TWIN GESTATION - FOLLOW-UP  No sonographic signs of twin twin transfusion syndrome evident on today's scan.  Continued surveillance every other week is advised.      OBESITY  See prior MFM notes for a detailed review.       OB ULTRASOUND REPORT   See imaging tab for complete ultrasound report or in PACS    Mono/Di twins    Twin A -   IUP in cephalic left presentation.    Placenta is posterior, high.   Cardiac activity is present 138 bpm  EFW 1267 g ( 2 lb 13 oz); 56%.   MVP is 4.8 cm.   UA Doppler 4.37 (96%).     Normal MCA Doppler PSV 0.78       Twin B -   IUP in cephalic right presentation.   Placenta is posterior.   Cardiac activity is present 144 bpm  EFW 1172 g (2 lb 9 oz); 46%.   MVP is 4.3 cm.   WNL UA Doppler 3.36 .     Increased MCA Doppler PSV 1.58      Discordance - 7.5 %         IMPRESSION:   Twin IUP at  28w3d   Diamniotic, MONOchorionic placentation   Obesity BMI 42  TWIN B: Velamentous Umbilical cord insertion twin B     RECOMMENDATIONS:   Weekly NST at 32 wks     Monthly growth US with BPP at or beyond 32 weeks  Plan  delivery by 37wks  TTTS checks every 2 wks by assessment of amniotic fluid volume and fetal bladder in both twins   Measurement of middle cerebral artery peak systolic velocity (MCA-PSV) in both fetuses at 24 weeks for early detection of twin anemia-polycythemia sequence (TAPS).  Aspirin  mg daily     Thank you for allowing me to participate in the care of your patient.  Please do not hesitate to contact me if additional questions or concerns arise.       Bubba Ronquillo D.O.  Maternal Fetal Medicine     30 minutes spent in review of records, patient consultation, documentation and coordination of care.  The relevant clinical matter(s) are summarized above.      Note to patient and family  The 21st Century Cures Act makes medical notes available to patients in the interest of transparency.  However, please be advised that this is a medical document.  It is intended as azxx-of-[id number] communication.  It is written and medical language may contain abbreviations or verbiage that are technical and unfamiliar.  It may appear blunt or direct.  Medical documents are intended to carry relevant information, facts as evident, and the clinical opinion of the practitioner.

## 2024-02-27 NOTE — PROGRESS NOTES
Outpatient Maternal-Fetal Medicine Follow-Up     Dear Dr. Kurtz     Thank you for requesting ultrasound evaluation and maternal fetal medicine consultation on your patient Ansley [place].  As you are aware she is a 33 year old female  with a twin pregnancy and an Estimated Date of Delivery: 24.  She returned to maternal-fetal medicine today for a follow-up visit.  Her history was reviewed from her prior visit and there were no interval changes.    Antepartum Risk Factors  Diamniotic, MONOchorionic placentation   Obesity BMI 42     PHYSICAL EXAMINATION:  /73   Pulse 101   Wt 264 lb (119.7 kg)   LMP 2023 (Exact Date)   BMI 43.93 kg/m²   General: alert and oriented, no acute distress  Abdomen: gravid, soft, non-tender       DISCUSSION  During her visit we discussed and reviewed the following issues:  MONOCHORIONIC / DIAMNIOTIC TWIN GESTATION - FOLLOW-UP  No sonographic signs of twin twin transfusion syndrome evident on today's scan.  Continued surveillance every other week is advised.      OBESITY  See prior MFM notes for a detailed review.     OB ULTRASOUND REPORT   See imaging tab for complete ultrasound report or in PACS     Twin A - IUP in cephalic left presentation.    Placenta is posterior.   Cardiac activity is present, 145 bpm   MVP is 4.2 cm.   UA Doppler 2.84.  MCA Doppler PSV 1.65.    Twin B - IUP in breech right presentation.   Placenta is posterior.   Cardiac activity is present, 154 bpm  MVP is 4.3 cm.   UA Doppler 3.18 .  MCA Doppler PSV 1.59.     IMPRESSION:   Twin IUP at  30w3d   Diamniotic, MONOchorionic placentation   TWIN B: Velamentous Umbilical cord insertion twin B  Elevated NCA Dopplers (twins A & B)  Obesity BMI 42     RECOMMENDATIONS:   Weekly NST at 32 wks     Follow-up MCA/UA DOpplers NEXT week  Monthly growth US with BPP at or beyond 32 weeks  Plan delivery by 37wks  TTTS checks every 2 wks by assessment of amniotic fluid volume and fetal bladder in both  twins   Measurement of middle cerebral artery peak systolic velocity (MCA-PSV) in both fetuses at 24 weeks for early detection of twin anemia-polycythemia sequence (TAPS).  Aspirin  mg daily     Thank you for allowing me to participate in the care of your patient.  Please do not hesitate to contact me if additional questions or concerns arise.       Angus Castellanos M.D.  Maternal-Fetal Medicine     20 minutes spent in review of records, patient consultation, documentation and coordination of care.  The relevant clinical matter(s) are summarized above.      Note to patient and family  The 21st Century Cures Act makes medical notes available to patients in the interest of transparency.  However, please be advised that this is a medical document.  It is intended as lknw-sw-wdbj communication.  It is written and medical language may contain abbreviations or verbiage that are technical and unfamiliar.  It may appear blunt or direct.  Medical documents are intended to carry relevant information, facts as evident, and the clinical opinion of the practitioner.

## 2024-02-28 ENCOUNTER — ROUTINE PRENATAL (OUTPATIENT)
Dept: OBGYN CLINIC | Facility: CLINIC | Age: 34
End: 2024-02-28
Payer: MEDICAID

## 2024-02-28 VITALS
HEIGHT: 65 IN | DIASTOLIC BLOOD PRESSURE: 74 MMHG | BODY MASS INDEX: 43.99 KG/M2 | SYSTOLIC BLOOD PRESSURE: 128 MMHG | WEIGHT: 264 LBS

## 2024-02-28 DIAGNOSIS — E66.01 MORBID OBESITY WITH BMI OF 40.0-44.9, ADULT (HCC): ICD-10-CM

## 2024-02-28 DIAGNOSIS — O09.90 SUPERVISION OF HIGH RISK PREGNANCY, ANTEPARTUM (HCC): Primary | ICD-10-CM

## 2024-02-28 DIAGNOSIS — O30.039 MONOCHORIONIC DIAMNIOTIC TWIN PREGNANCY, ANTEPARTUM (HCC): ICD-10-CM

## 2024-02-28 PROCEDURE — 99213 OFFICE O/P EST LOW 20 MIN: CPT | Performed by: OBSTETRICS & GYNECOLOGY

## 2024-02-28 NOTE — PROGRESS NOTES
33 year old  with twin pregnancy at 29w4d     Feels good.  Ctx: none  VB: none  LOF: non  FM + x 2      Return OB  Pre-Mir Care: UTD.   - Tdap next appt.    Mono-di twins  - following with MFM  Weekly NST at 32 wks     Monthly growth US  Plan delivery by 37wks  TTTS checks every 2 wks beginning at 16 weeks by assessment of amniotic fluid volume and fetal bladder in both twins       Patient Active Problem List   Diagnosis    Prenatal exposure to HIV affecting pregnancy management    Sickle cell trait (HCC)    Morbid obesity with BMI of 40.0-44.9, adult ([facility])    Monochorionic diamniotic twin pregnancy, antepartum ([facility])    Request for sterilization       - Return to clinic in: 2 weeks    Jessie Kurtz DO

## 2024-03-05 ENCOUNTER — HOSPITAL ENCOUNTER (OUTPATIENT)
Dept: PERINATAL CARE | Facility: HOSPITAL | Age: 34
Discharge: HOME | End: 2024-03-05
Attending: OBSTETRICS & GYNECOLOGY

## 2024-03-05 VITALS
SYSTOLIC BLOOD PRESSURE: 123 MMHG | DIASTOLIC BLOOD PRESSURE: 73 MMHG | HEART RATE: 101 BPM | WEIGHT: 264 LBS | BODY MASS INDEX: 44 KG/M2

## 2024-03-05 DIAGNOSIS — O30.039 MONOCHORIONIC DIAMNIOTIC TWIN PREGNANCY, ANTEPARTUM (HCC): Chronic | ICD-10-CM

## 2024-03-05 DIAGNOSIS — E66.01 MORBID OBESITY WITH BMI OF 40.0-44.9, ADULT (HCC): Chronic | ICD-10-CM

## 2024-03-05 DIAGNOSIS — O30.039 MONOCHORIONIC DIAMNIOTIC TWIN PREGNANCY, ANTEPARTUM (HCC): Primary | Chronic | ICD-10-CM

## 2024-03-05 DIAGNOSIS — O99.213 MATERNAL MORBID OBESITY IN THIRD TRIMESTER, ANTEPARTUM (HCC): ICD-10-CM

## 2024-03-05 DIAGNOSIS — E66.01 MATERNAL MORBID OBESITY IN THIRD TRIMESTER, ANTEPARTUM (HCC): ICD-10-CM

## 2024-03-05 PROCEDURE — 76821 MIDDLE CEREBRAL ARTERY ECHO: CPT

## 2024-03-05 PROCEDURE — 76815 OB US LIMITED FETUS(S): CPT | Performed by: OBSTETRICS & GYNECOLOGY

## 2024-03-05 PROCEDURE — 76820 UMBILICAL ARTERY ECHO: CPT

## 2024-03-13 ENCOUNTER — HOSPITAL ENCOUNTER (OUTPATIENT)
Dept: PERINATAL CARE | Facility: HOSPITAL | Age: 34
Discharge: HOME | End: 2024-03-13
Attending: OBSTETRICS & GYNECOLOGY

## 2024-03-13 VITALS
WEIGHT: 264 LBS | DIASTOLIC BLOOD PRESSURE: 78 MMHG | HEART RATE: 116 BPM | BODY MASS INDEX: 44 KG/M2 | SYSTOLIC BLOOD PRESSURE: 145 MMHG

## 2024-03-13 DIAGNOSIS — E66.01 MORBID OBESITY WITH BMI OF 40.0-44.9, ADULT (HCC): Chronic | ICD-10-CM

## 2024-03-13 DIAGNOSIS — O30.039 MONOCHORIONIC DIAMNIOTIC TWIN PREGNANCY, ANTEPARTUM (HCC): Chronic | ICD-10-CM

## 2024-03-13 DIAGNOSIS — O30.039 MONOCHORIONIC DIAMNIOTIC TWIN PREGNANCY, ANTEPARTUM (HCC): Primary | Chronic | ICD-10-CM

## 2024-03-13 PROCEDURE — 76815 OB US LIMITED FETUS(S): CPT | Performed by: OBSTETRICS & GYNECOLOGY

## 2024-03-13 PROCEDURE — 76816 OB US FOLLOW-UP PER FETUS: CPT

## 2024-03-13 PROCEDURE — 76821 MIDDLE CEREBRAL ARTERY ECHO: CPT

## 2024-03-13 PROCEDURE — 76820 UMBILICAL ARTERY ECHO: CPT

## 2024-03-13 NOTE — PROGRESS NOTES
Outpatient Maternal-Fetal Medicine Follow-Up     Dear Dr. Kurtz     Thank you for requesting ultrasound evaluation and maternal fetal medicine consultation on your patient Ansley [place].  As you are aware she is a 33 year old female  with a twin pregnancy and an Estimated Date of Delivery: 24.  She returned to maternal-fetal medicine today for a follow-up visit.  Her history was reviewed from her prior visit and there were no interval changes.    Antepartum Risk Factors  Diamniotic, MONOchorionic placentation   Obesity BMI 42     PHYSICAL EXAMINATION:  /78   Pulse 116   Wt 264 lb (119.7 kg)   LMP 2023 (Exact Date)   BMI 43.93 kg/m²   General: alert and oriented, no acute distress  Abdomen: gravid, soft, non-tender       DISCUSSION  During her visit we discussed and reviewed the following issues:  MONOCHORIONIC / DIAMNIOTIC TWIN GESTATION - FOLLOW-UP  No sonographic signs of twin twin transfusion syndrome evident on today's scan.  Continued surveillance every other week is advised.      OBESITY  See prior MFM notes for a detailed review.     OB ULTRASOUND REPORT   See imaging tab for complete ultrasound report or in PACS     Twin A -   IUP in cephalic left presentation.    Placenta is posterior.   Cardiac activity is present, 142 bpm   MVP is 5 cm.   UA Doppler 2.97.    MCA Doppler PSV 1.46    Twin B -   IUP in cephalic right presentation.   Placenta is posterior.   Cardiac activity is present, 143 bpm  MVP is 5.7 cm.   UA Doppler 3 .    MCA Doppler PSV 1.38       IMPRESSION:   Twin IUP at  31w4d   Diamniotic, MONOchorionic placentation   TWIN B: Velamentous Umbilical cord insertion twin B  Obesity BMI 42     RECOMMENDATIONS:   Weekly NST at 32 wks     Follow-up MCA/UA Dopplers next week  Monthly growth US with BPP at or beyond 32 weeks  Plan delivery by 37wks  TTTS checks every 2 wks by assessment of amniotic fluid volume and fetal bladder in both twins   Measurement of middle  cerebral artery peak systolic velocity (MCA-PSV) in both fetuses at 24 weeks for early detection of twin anemia-polycythemia sequence (TAPS).  Aspirin  mg daily     Thank you for allowing me to participate in the care of your patient.  Please do not hesitate to contact me if additional questions or concerns arise.       Bubba Ronquillo D.O.  Maternal Fetal Medicine      20 minutes spent in review of records, patient consultation, documentation and coordination of care.  The relevant clinical matter(s) are summarized above.      Note to patient and family  The 21st Century Cures Act makes medical notes available to patients in the interest of transparency.  However, please be advised that this is a medical document.  It is intended as jquo-wy-qhdt communication.  It is written and medical language may contain abbreviations or verbiage that are technical and unfamiliar.  It may appear blunt or direct.  Medical documents are intended to carry relevant information, facts as evident, and the clinical opinion of the practitioner.

## 2024-03-19 ENCOUNTER — HOSPITAL ENCOUNTER (OUTPATIENT)
Dept: PERINATAL CARE | Facility: HOSPITAL | Age: 34
Discharge: HOME | End: 2024-03-19
Attending: OBSTETRICS & GYNECOLOGY

## 2024-03-19 VITALS
BODY MASS INDEX: 44 KG/M2 | SYSTOLIC BLOOD PRESSURE: 130 MMHG | DIASTOLIC BLOOD PRESSURE: 72 MMHG | WEIGHT: 264 LBS | HEART RATE: 99 BPM

## 2024-03-19 DIAGNOSIS — O30.039 MONOCHORIONIC DIAMNIOTIC TWIN PREGNANCY, ANTEPARTUM (HCC): Primary | Chronic | ICD-10-CM

## 2024-03-19 DIAGNOSIS — O30.039 MONOCHORIONIC DIAMNIOTIC TWIN PREGNANCY, ANTEPARTUM (HCC): Chronic | ICD-10-CM

## 2024-03-19 DIAGNOSIS — Z20.6: ICD-10-CM

## 2024-03-19 DIAGNOSIS — E66.01 MORBID OBESITY WITH BMI OF 40.0-44.9, ADULT (HCC): ICD-10-CM

## 2024-03-19 PROCEDURE — 76816 OB US FOLLOW-UP PER FETUS: CPT

## 2024-03-19 PROCEDURE — 76819 FETAL BIOPHYS PROFIL W/O NST: CPT

## 2024-03-19 PROCEDURE — 76816 OB US FOLLOW-UP PER FETUS: CPT | Performed by: OBSTETRICS & GYNECOLOGY

## 2024-03-19 PROCEDURE — 76820 UMBILICAL ARTERY ECHO: CPT

## 2024-03-19 PROCEDURE — 76821 MIDDLE CEREBRAL ARTERY ECHO: CPT

## 2024-03-19 NOTE — PROGRESS NOTES
Outpatient Maternal-Fetal Medicine Follow-Up     Dear Dr. Kurtz     Thank you for requesting ultrasound evaluation and maternal fetal medicine consultation on your patient Ansley [place].  As you are aware she is a 33 year old female  with a twin pregnancy and an Estimated Date of Delivery: 24.  She returned to maternal-fetal medicine today for a follow-up visit.  Her history was reviewed from her prior visit and there were no interval changes.    Antepartum Risk Factors  Diamniotic, MONOchorionic placentation   Obesity BMI 42     PHYSICAL EXAMINATION:  /72   Pulse 99   Wt 264 lb (119.7 kg)   LMP 2023 (Exact Date)   BMI 43.93 kg/m²   General: alert and oriented, no acute distress  Abdomen: gravid, soft, non-tender       DISCUSSION  During her visit we discussed and reviewed the following issues:  MONOCHORIONIC / DIAMNIOTIC TWIN GESTATION - FOLLOW-UP  No sonographic signs of twin twin transfusion syndrome evident on today's scan.  Continued surveillance every other week is advised.      OBESITY  See prior MFM notes for a detailed review.     OB ULTRASOUND REPORT   See imaging tab for complete ultrasound report or in PACS     Twin A -   IUP in cephalic presentation.    Placenta is posterior.   Cardiac activity is present, 141 bpm  EFW 1997 g ( 4 lb 6 oz); 48%.   MVP is 6 cm. BPP is 8/8.  UA Doppler 2.41.  MCA Doppler PSV 1.67 ...      Twin B -   IUP in cephalic presentation.   Placenta is posterior.   Cardiac activity is present, 146 bpm  EFW 1834 g (4 lb 1 oz); 36%.   MVP is 6.9 cm. BPP is 8/8.  UA Doppler 3.28 .  MCA Doppler PSV 1.51 ...      Discordance - 8.1 %          IMPRESSION:   Twin IUP at 32w3d   Diamniotic, MONOchorionic placentation   TWIN B: Velamentous Umbilical cord insertion twin B  Obesity BMI 42     RECOMMENDATIONS:   Weekly NST at 32 wks     Monthly growth US with BPP at or beyond 32 weeks  Plan delivery by 37wks  TTTS checks every 2 wks by assessment of amniotic  fluid volume and fetal bladder in both twins   Measurement of middle cerebral artery peak systolic velocity (MCA-PSV) in both fetuses at 24 weeks for early detection of twin anemia-polycythemia sequence (TAPS).  Aspirin  mg daily     Thank you for allowing me to participate in the care of your patient.  Please do not hesitate to contact me if additional questions or concerns arise.       Bubba Ronquillo D.O.  Maternal Fetal Medicine      20 minutes spent in review of records, patient consultation, documentation and coordination of care.  The relevant clinical matter(s) are summarized above.      Note to patient and family  The 21st Century Cures Act makes medical notes available to patients in the interest of transparency.  However, please be advised that this is a medical document.  It is intended as tbor-cr-jils communication.  It is written and medical language may contain abbreviations or verbiage that are technical and unfamiliar.  It may appear blunt or direct.  Medical documents are intended to carry relevant information, facts as evident, and the clinical opinion of the practitioner.

## 2024-03-19 NOTE — PROGRESS NOTES
Outpatient Maternal-Fetal Medicine Follow-Up     Dear Dr. Kurtz     Thank you for requesting ultrasound evaluation and maternal fetal medicine consultation on your patient Ansley [place].  As you are aware she is a 33 year old female  with a twin pregnancy and an Estimated Date of Delivery: 24.  She returned to maternal-fetal medicine today for a follow-up visit.  Her history was reviewed from her prior visit and there were no interval changes.     Antepartum Risk Factors  Diamniotic, MONOchorionic placentation   Obesity BMI 42     PHYSICAL EXAMINATION:  /79   Pulse 98   Wt 275 lb (124.7 kg)   LMP 2023 (Exact Date)   BMI 45.76 kg/m²   General: alert and oriented, no acute distress  Abdomen: gravid, soft, non-tender     DISCUSSION  During her visit we discussed and reviewed the following issues:  MONOCHORIONIC / DIAMNIOTIC TWIN GESTATION - FOLLOW-UP  No sonographic signs of twin twin transfusion syndrome evident on today's scan.  Continued surveillance every other week is advised.      OBESITY  See prior MFM notes for a detailed review.     OB ULTRASOUND REPORT   See imaging tab for complete ultrasound report or in PACS     Ultrasound findings:   Twin A - IUP in cephalic left presentation.    Placenta is posterior, high.   Cardiac activity is present, 158 bpm  EFW 2233 g ( 4 lb 15 oz); 31%.   MVP is 5.1 cm. BPP is 8/8.  UA Doppler 2.44.  MCA Doppler PSV 1.72   TWIN A: The fetal measurements are consistent with established EDC. No gross ultrasound evidence of structural abnormalities are seen today. The patient understands that ultrasound cannot rule out all structural and chromosomal abnormalities.    Twin B - IUP in cephalic right presentation.   Placenta is posterior, high. Insertion site: velamentous insertion   Cardiac activity is present, 167 bpm  EFW 2244 g (4 lb 15 oz); 31%.   MVP is 6.2 cm. BPP is 8/8.  UA Doppler 2.42 .  MCA Doppler PSV 1.68   TWIN B: The fetal measurements  are consistent with established EDC. No gross ultrasound evidence of structural abnormalities are seen today. The patient understands that ultrasound cannot rule out all structural and chromosomal abnormalities.    Discordance - 0.5 %      IMPRESSION:   Twin IUP at  34w3d   Diamniotic, MONOchorionic placentation   TWIN B: Velamentous Umbilical cord insertion twin B  Elevated MCA Dopplers   (twins A & B) on 3/5/24, high-normal 3/13/24, repeat elevated Twins A & B on 3/19/24 - today markedly elevated x 2 (Twin A 1.72 MoM, Twin B 1.68 MoM)  Obesity BMI 42     RECOMMENDATIONS:   Administer course of corticosteroids and advise delivery after course completed  Continuous fetal heart rate monitoring while steroid course is being administered  Obviously an immediate delivery should a nonreassuring fetal heart rate tracing be noted  Obtained Ariela with admission labs     Thank you for allowing me to participate in the care of your patient.  Please do not hesitate to contact me if additional questions or concerns arise.       Discussed with Dr. Kurtz -patient sent to labor and delivery    Angus Castellanos M.D.  Maternal-Fetal Medicine     40 minutes spent in review of records, patient consultation, documentation and coordination of care.  The relevant clinical matter(s) are summarized above.      Note to patient and family  The 21st Century Cures Act makes medical notes available to patients in the interest of transparency.  However, please be advised that this is a medical document.  It is intended as wpjq-nm-ffuh communication.  It is written and medical language may contain abbreviations or verbiage that are technical and unfamiliar.  It may appear blunt or direct.  Medical documents are intended to carry relevant information, facts as evident, and the clinical opinion of the practitioner.

## 2024-04-02 ENCOUNTER — HOSPITAL ENCOUNTER (OUTPATIENT)
Dept: PERINATAL CARE | Facility: HOSPITAL | Age: 34
Discharge: HOME | End: 2024-04-02
Attending: INTERNAL MEDICINE
Payer: COMMERCIAL

## 2024-04-02 ENCOUNTER — HOSPITAL ENCOUNTER (INPATIENT)
Facility: HOSPITAL | Age: 34
LOS: 5 days | Discharge: HOME | End: 2024-04-07
Attending: OBSTETRICS & GYNECOLOGY | Admitting: OBSTETRICS & GYNECOLOGY
Payer: COMMERCIAL

## 2024-04-02 ENCOUNTER — HOSPITAL ENCOUNTER (OUTPATIENT)
Dept: PERINATAL CARE | Facility: HOSPITAL | Age: 34
Discharge: HOME | End: 2024-04-02
Attending: OBSTETRICS & GYNECOLOGY
Payer: COMMERCIAL

## 2024-04-02 VITALS
WEIGHT: 275 LBS | DIASTOLIC BLOOD PRESSURE: 79 MMHG | BODY MASS INDEX: 46 KG/M2 | HEART RATE: 98 BPM | SYSTOLIC BLOOD PRESSURE: 146 MMHG

## 2024-04-02 DIAGNOSIS — O99.213 MATERNAL MORBID OBESITY IN THIRD TRIMESTER, ANTEPARTUM (HCC): ICD-10-CM

## 2024-04-02 DIAGNOSIS — O30.039 MONOCHORIONIC DIAMNIOTIC TWIN PREGNANCY, ANTEPARTUM (HCC): Chronic | ICD-10-CM

## 2024-04-02 DIAGNOSIS — E66.01 MATERNAL MORBID OBESITY IN THIRD TRIMESTER, ANTEPARTUM (HCC): ICD-10-CM

## 2024-04-02 DIAGNOSIS — E66.01 MORBID OBESITY WITH BMI OF 40.0-44.9, ADULT (HCC): ICD-10-CM

## 2024-04-02 DIAGNOSIS — O30.039 MONOCHORIONIC DIAMNIOTIC TWIN PREGNANCY, ANTEPARTUM (HCC): Primary | Chronic | ICD-10-CM

## 2024-04-02 PROBLEM — O28.3 ABNORMAL ULTRASONIC FINDING ON ANTENATAL SCREENING OF MOTHER: Status: ACTIVE | Noted: 2024-04-02

## 2024-04-02 LAB
ALBUMIN SERPL-MCNC: 3.8 G/DL (ref 3.2–4.8)
ALBUMIN/GLOB SERPL: 1.2 {RATIO} (ref 1–2)
ALP LIVER SERPL-CCNC: 136 U/L
ALT SERPL-CCNC: 25 U/L
ANION GAP SERPL CALC-SCNC: 10 MMOL/L (ref 0–18)
ANTIBODY SCREEN: NEGATIVE
AST SERPL-CCNC: 28 U/L (ref ?–34)
BASOPHILS # BLD AUTO: 0.01 X10(3) UL (ref 0–0.2)
BASOPHILS NFR BLD AUTO: 0.1 %
BILIRUB SERPL-MCNC: 0.4 MG/DL (ref 0.3–1.2)
BUN BLD-MCNC: 6 MG/DL (ref 9–23)
BUN/CREAT SERPL: 8.7 (ref 10–20)
CALCIUM BLD-MCNC: 9 MG/DL (ref 8.7–10.4)
CHLORIDE SERPL-SCNC: 109 MMOL/L (ref 98–112)
CO2 SERPL-SCNC: 21 MMOL/L (ref 21–32)
CREAT BLD-MCNC: 0.69 MG/DL
CREAT UR-SCNC: 141 MG/DL
DEPRECATED RDW RBC AUTO: 38.5 FL (ref 35.1–46.3)
EGFRCR SERPLBLD CKD-EPI 2021: 118 ML/MIN/1.73M2 (ref 60–?)
EOSINOPHIL # BLD AUTO: 0.06 X10(3) UL (ref 0–0.7)
EOSINOPHIL NFR BLD AUTO: 0.9 %
ERYTHROCYTE [DISTWIDTH] IN BLOOD BY AUTOMATED COUNT: 13.5 % (ref 11–15)
GLOBULIN PLAS-MCNC: 3.1 G/DL (ref 2.8–4.4)
GLUCOSE BLD-MCNC: 61 MG/DL (ref 70–99)
HCT VFR BLD AUTO: 33.9 %
HGB BLD-MCNC: 11.4 G/DL
HIV 1+2 AB+HIV1 P24 AG SERPL QL IA: NONREACTIVE
IMM GRANULOCYTES # BLD AUTO: 0.02 X10(3) UL (ref 0–1)
IMM GRANULOCYTES NFR BLD: 0.3 %
LYMPHOCYTES # BLD AUTO: 1.32 X10(3) UL (ref 1–4)
LYMPHOCYTES NFR BLD AUTO: 19.4 %
MCH RBC QN AUTO: 27 PG (ref 26–34)
MCHC RBC AUTO-ENTMCNC: 33.6 G/DL (ref 31–37)
MCV RBC AUTO: 80.1 FL
MONOCYTES # BLD AUTO: 0.69 X10(3) UL (ref 0.1–1)
MONOCYTES NFR BLD AUTO: 10.1 %
NEUTROPHILS # BLD AUTO: 4.71 X10 (3) UL (ref 1.5–7.7)
NEUTROPHILS # BLD AUTO: 4.71 X10(3) UL (ref 1.5–7.7)
NEUTROPHILS NFR BLD AUTO: 69.2 %
OSMOLALITY SERPL CALC.SUM OF ELEC: 286 MOSM/KG (ref 275–295)
PLATELET # BLD AUTO: 237 10(3)UL (ref 150–450)
POTASSIUM SERPL-SCNC: 4.1 MMOL/L (ref 3.5–5.1)
PROT SERPL-MCNC: 6.9 G/DL (ref 5.7–8.2)
PROT UR-MCNC: 33.4 MG/DL (ref ?–14)
PROT/CREAT UR-RTO: 0.24
RBC # BLD AUTO: 4.23 X10(6)UL
RH BLOOD TYPE: POSITIVE
SODIUM SERPL-SCNC: 140 MMOL/L (ref 136–145)
T PALLIDUM AB SER QL IA: NONREACTIVE
WBC # BLD AUTO: 6.8 X10(3) UL (ref 4–11)

## 2024-04-02 PROCEDURE — 76820 UMBILICAL ARTERY ECHO: CPT

## 2024-04-02 PROCEDURE — 76819 FETAL BIOPHYS PROFIL W/O NST: CPT

## 2024-04-02 PROCEDURE — 76816 OB US FOLLOW-UP PER FETUS: CPT

## 2024-04-02 PROCEDURE — 76815 OB US LIMITED FETUS(S): CPT | Performed by: OBSTETRICS & GYNECOLOGY

## 2024-04-02 PROCEDURE — 76821 MIDDLE CEREBRAL ARTERY ECHO: CPT

## 2024-04-02 RX ORDER — ZOLPIDEM TARTRATE 5 MG/1
5 TABLET ORAL NIGHTLY PRN
Status: DISCONTINUED | OUTPATIENT
Start: 2024-04-02 | End: 2024-04-04

## 2024-04-02 RX ORDER — BETAMETHASONE SODIUM PHOSPHATE AND BETAMETHASONE ACETATE 3; 3 MG/ML; MG/ML
INJECTION, SUSPENSION INTRA-ARTICULAR; INTRALESIONAL; INTRAMUSCULAR; SOFT TISSUE
Status: COMPLETED
Start: 2024-04-02 | End: 2024-04-02

## 2024-04-02 RX ORDER — ACETAMINOPHEN 500 MG
1000 TABLET ORAL EVERY 6 HOURS PRN
Status: DISCONTINUED | OUTPATIENT
Start: 2024-04-02 | End: 2024-04-04

## 2024-04-02 RX ORDER — ACETAMINOPHEN 500 MG
500 TABLET ORAL EVERY 6 HOURS PRN
Status: DISCONTINUED | OUTPATIENT
Start: 2024-04-02 | End: 2024-04-04

## 2024-04-02 RX ORDER — CALCIUM CARBONATE 500 MG/1
1000 TABLET, CHEWABLE ORAL
Status: DISCONTINUED | OUTPATIENT
Start: 2024-04-02 | End: 2024-04-04

## 2024-04-02 RX ORDER — BETAMETHASONE SODIUM PHOSPHATE AND BETAMETHASONE ACETATE 3; 3 MG/ML; MG/ML
12 INJECTION, SUSPENSION INTRA-ARTICULAR; INTRALESIONAL; INTRAMUSCULAR; SOFT TISSUE EVERY 24 HOURS
Status: COMPLETED | OUTPATIENT
Start: 2024-04-02 | End: 2024-04-03

## 2024-04-02 RX ORDER — SODIUM CHLORIDE, SODIUM LACTATE, POTASSIUM CHLORIDE, CALCIUM CHLORIDE 600; 310; 30; 20 MG/100ML; MG/100ML; MG/100ML; MG/100ML
INJECTION, SOLUTION INTRAVENOUS CONTINUOUS
Status: DISCONTINUED | OUTPATIENT
Start: 2024-04-02 | End: 2024-04-04

## 2024-04-02 RX ORDER — DOCUSATE SODIUM 100 MG/1
100 CAPSULE, LIQUID FILLED ORAL 2 TIMES DAILY
Status: DISCONTINUED | OUTPATIENT
Start: 2024-04-02 | End: 2024-04-04

## 2024-04-02 RX ORDER — CHOLECALCIFEROL (VITAMIN D3) 25 MCG
1 TABLET,CHEWABLE ORAL DAILY
Status: DISCONTINUED | OUTPATIENT
Start: 2024-04-02 | End: 2024-04-04

## 2024-04-02 NOTE — H&P
[facility]  Obstetrics and Gynecology  History & Physical    Ansley Colorado Patient Status:  Inpatient    3/13/1990 MRN [id number]   Location [facility] CENTER Attending Jessie Kurtz DO   Hospital Day 0 PCP Peace Alexander MD     CC: Patient is here for betamethasone then IOL    SUBJECTIVE:    Ansley Colorado is a 34 year old  female at 34w3d Estimated Date of Delivery: 24 who is being admitted for  betamethasone then planned IOL for abnormal dopplers of both twins . Her current obstetrical history is significant for mono-di twins. Was having US with MFM today to f/u growth and dopplers. Both babies showed elevated dopplers, with MFM recommendation for betamethasone then delivery.    Patient reports tyrone de la rosa contractions, no bleeding, no LOF. Regular FM x 2.    negative Nausea, Vomiting, headache, vision changes and RUQ/Epigastric pain.       JACKIE Confirmation  LMP: Patient's last menstrual period was 2023 (exact date).  JACKIE: 2024, Date entered prior to episode creation     OB Ultrasounds:   1) OB US with MFM today:  Twin A - IUP in cephalic left presentation.    Placenta is posterior, high.   Cardiac activity is present, 158 bpm  EFW 2233 g ( 4 lb 15 oz); 31%.   MVP is 5.1 cm. BPP is 8/8.  UA Doppler 2.44.  MCA Doppler PSV 1.72      Twin B - IUP in cephalic right presentation.   Placenta is posterior, high. Insertion site: velamentous insertion   Cardiac activity is present, 167 bpm  EFW 2244 g (4 lb 15 oz); 31%.   MVP is 6.2 cm. BPP is 8/8.  UA Doppler 2.42 .  MCA Doppler PSV 1.68      Discordance - 0.5 %       Obstetric History:   OB History    Para Term  AB Living   5 3 3 0 1 3   SAB IAB Ectopic Multiple Live Births   0 0 0 0 3      # Outcome Date GA Lbr Landon/2nd Weight Sex Delivery Anes PTL Lv   5 Current            4 Term 20 39w5d 04:38 / 00:06 7 lb 8.1 oz (3.405 kg) F NORMAL SPONT None N MAX      Complications:  Meconium   3 Term 17 40w3d 09:30 / 00:21 6 lb 10 oz (3.005 kg) F NORMAL SPONT None N MAX      Birth Comments: Mother's age: 25  Maternal Blood Type: O Positive  : 3  Para: 2  Hearing Test: Pass  Bili T: .0.8 at 41 hrs      Complications: Group B beta strep +, Meconium   2 AB 16 8w0d          1 Term 12 40w0d  6 lb 7 oz (2.92 kg) F NORMAL SPONT None N MAX     Past Medical History:   Past Medical History:   Diagnosis Date    Chicken pox     Childhood    Sickle cell trait (HCC)      Past Social History:   Past Surgical History:   Procedure Laterality Date    WISDOM TEETH REMOVED           Family History:   Family History   Problem Relation Age of Onset    Diabetes Father     Diabetes Maternal Grandmother     Heart Disorder Maternal Grandmother     Hypertension Maternal Grandmother     Cancer Maternal Grandfather     Diabetes Maternal Grandfather     Diabetes Paternal Grandmother     Cancer Paternal Grandmother         Ovarian/uterine    Other (congestive heart failure) Paternal Grandmother     Prostate Cancer Paternal Grandfather      Social History:   Social History     Tobacco Use    Smoking status: Never    Smokeless tobacco: Never   Substance Use Topics    Alcohol use: Yes     Alcohol/week: 1.0 standard drink of alcohol     Types: 1 Standard drinks or equivalent per week       Home Meds:   Medications Prior to Admission   Medication Sig Dispense Refill Last Dose    fluconazole (DIFLUCAN) 150 MG Oral Tab Take 1 tablet (150 mg total) by mouth every 3 (three) days. Take one now and repeat in 72 hours (Patient not taking: Reported on 2023) 2 tablet 0     prenatal vitamin with DHA 27-0.8-228 MG Oral Cap Take 1 capsule by mouth daily.        Allergies:   Allergies   Allergen Reactions    Nickel RASH    Soap RASH     DOVE SOAP and TIDE LAUNDRY DETERGENT        OBJECTIVE:    Pulse:  [98] 98  BP: (146)/(79) 146/79  There is no height or weight on file to calculate BMI.    General: AAO. NAD.    Lungs: Respirations non labored   CV: peripheral pulses +2 bilaterally   Abdomen: soft, nontender, nondistended, no abnormal masses, no epigastric pain and FHT present, gravid   Extremities: negative edema bilaterally, negative calf tenderness bilaterally    FHT:   Baby A: moderate variability/140 BPM / Positive accelerations/Negative decelerations   Baby B: moderate variability / 150s bpm / + accelerations / no decelerations  TOCO: contractions rare    SVE: fingertip / 25 / -3   GBS swab collected      Prenatal Labs Brief Review   Blood Type:   Lab Results   Component Value Date    ABO O 2023    RH Positive 2023     GBS:  collected today      Inpatient labs:       ASSESSMENT/ PLAN:    Ansley Colorado is a 34 year old  female at 34w3d Estimated Date of Delivery: 24 who is being admitted for  betamethasone then delivery .    Patient Active Problem List   Diagnosis    Prenatal exposure to HIV affecting pregnancy management    Sickle cell trait ([facility])    Morbid obesity with BMI of 40.0-44.9, adult ([facility])    Monochorionic diamniotic twin pregnancy, antepartum ([facility])    Request for sterilization       1. Abnormal fetal dopplers x 2:   - admit with routine labs  - betamethasone now and again in24 hours  - continuous fetal monitoring - reactive x 2 currently  - plan for IOL after betamethasone complete  2. Fetal monitoring: CEFM  3. GBS: collected, if not resulted by the time IOL starts, GBS prophylaxis  4. Pain: PRN  5. Elevated BP   - in [facility] office, with  borderline BP on L&D - send pre-eclampsia labs    Risks, benefits, alternatives and possible complications have been discussed in detail with the patient.  Pre-admission, admission, and post admission procedures and expectations were discussed in detail.  All questions answered, all appropriate consents signed at the Hospital. Admission is planned for today.     DO MIKE Gillette

## 2024-04-03 LAB
GROUP B STREP BY PCR FOR PCR OVT: NEGATIVE
HGB F MFR BLD KLEIH BETKE: <0.1 %

## 2024-04-03 RX ORDER — NALBUPHINE HYDROCHLORIDE 10 MG/ML
10 INJECTION, SOLUTION INTRAMUSCULAR; INTRAVENOUS; SUBCUTANEOUS EVERY 4 HOURS PRN
Status: DISCONTINUED | OUTPATIENT
Start: 2024-04-03 | End: 2024-04-04

## 2024-04-03 RX ORDER — HYDROXYZINE HYDROCHLORIDE 50 MG/ML
50 INJECTION, SOLUTION INTRAMUSCULAR EVERY 4 HOURS PRN
Status: DISCONTINUED | OUTPATIENT
Start: 2024-04-03 | End: 2024-04-04

## 2024-04-03 NOTE — PROGRESS NOTES
[facility] ([facility])  OB/GYN: Antepartum Progress Note     SUBJECTIVE:  Pt is a 34 year old  female at 34w4d who was admitted on 2024 for Mono-di twins with abnormal  testing.   Fetal Movement: +. Vaginal Bleeding: no. Contractions: last night, none now. Leakage of Fluid: no.     OBJECTIVE:  Vital signs in last 24 hours:  Temp:  [98.2 °F (36.8 °C)-98.8 °F (37.1 °C)] 98.2 °F (36.8 °C)  Pulse:  [82-98] 82  Resp:  [16] 16  BP: (111-146)/(64-84) 124/73  Temps:   Temp Readings from Last 3 Encounters:   24 98.2 °F (36.8 °C) (Oral)   24 97.6 °F (36.4 °C) (Temporal)   11/10/23 97 °F (36.1 °C) (Temporal)     Maximum Temperature: Temp (24hrs), Av.5 °F (36.9 °C), Min:98.2 °F (36.8 °C), Max:98.8 °F (37.1 °C)     BPs:  BP Readings from Last 3 Encounters:   24 124/73   24 146/79   24 130/72     Weights:  Wt Readings from Last 3 Encounters:   24 275 lb (124.7 kg)   24 275 lb (124.7 kg)   24 264 lb (119.7 kg)       Intake/Output:  Totals for last 24 hours:     Intake/Output Summary (Last 24 hours) at 4/3/2024 0818  Last data filed at 2024 1627  Gross per 24 hour   Intake --   Output 100 ml   Net -100 ml       Uterine Contraction: Q 5 min.    NST:  Twin A baseline 135/ moderate henry/ no accels/ no decels  Twin B baseline 145/ moderate henry/ no accels/ late and prolonged decel at 0650.     Labs:  Lab Results   Component Value Date    WBC 6.8 2024    HGB 11.4 2024    HCT 33.9 2024    .0 2024    CREATSERUM 0.69 2024    BUN 6 2024     2024    K 4.1 2024     2024    CO2 21.0 2024    GLU 61 2024    CA 9.0 2024    ALB 3.8 2024    ALKPHO 136 2024    BILT 0.4 2024    TP 6.9 2024    AST 28 2024    ALT 25 2024   '    ASSESSMENT/PLAN:  Pt is a 34 year old  female at 34w4d who was admitted on 2024 for abnormal   testing with mono-di twin gestation.     Hospital Day 1    Active Problems:  Patient Active Problem List   Diagnosis    Prenatal exposure to HIV affecting pregnancy management    Sickle cell trait ([facility])    Morbid obesity with BMI of 40.0-44.9, adult ([facility])    Monochorionic diamniotic twin pregnancy, antepartum ([facility])    Request for sterilization    Abnormal ultrasonic finding on  screening of mother     1) Abnormal fetal dopplers  - Plan for induction of labor when betamethasone complete.   - With recurrent decelerations for Twin B may start induction of labor versus  section.   - Patient is NPO now.   - Continue CEFM.     MD MIKE Brunson

## 2024-04-04 ENCOUNTER — ANESTHESIA EVENT (OUTPATIENT)
Dept: OBGYN UNIT | Facility: HOSPITAL | Age: 34
End: 2024-04-04
Payer: COMMERCIAL

## 2024-04-04 ENCOUNTER — ANESTHESIA (OUTPATIENT)
Dept: OBGYN UNIT | Facility: HOSPITAL | Age: 34
End: 2024-04-04
Payer: COMMERCIAL

## 2024-04-04 PROBLEM — O30.039 MONOCHORIONIC DIAMNIOTIC TWIN PREGNANCY, ANTEPARTUM (HCC): Chronic | Status: RESOLVED | Noted: 2023-11-01 | Resolved: 2024-04-04

## 2024-04-04 PROBLEM — O28.3 ABNORMAL ULTRASONIC FINDING ON ANTENATAL SCREENING OF MOTHER: Status: RESOLVED | Noted: 2024-04-02 | Resolved: 2024-04-04

## 2024-04-04 PROBLEM — O30.93: Status: ACTIVE | Noted: 2024-04-04

## 2024-04-04 PROBLEM — O32.9XX0: Status: ACTIVE | Noted: 2024-04-04

## 2024-04-04 PROCEDURE — 59514 CESAREAN DELIVERY ONLY: CPT | Performed by: OBSTETRICS & GYNECOLOGY

## 2024-04-04 PROCEDURE — 59409 OBSTETRICAL CARE: CPT | Performed by: OBSTETRICS & GYNECOLOGY

## 2024-04-04 PROCEDURE — 59515 CESAREAN DELIVERY: CPT | Performed by: OBSTETRICS & GYNECOLOGY

## 2024-04-04 PROCEDURE — 10907ZC DRAINAGE OF AMNIOTIC FLUID, THERAPEUTIC FROM PRODUCTS OF CONCEPTION, VIA NATURAL OR ARTIFICIAL OPENING: ICD-10-PCS | Performed by: OBSTETRICS & GYNECOLOGY

## 2024-04-04 PROCEDURE — 3E0DXGC INTRODUCTION OF OTHER THERAPEUTIC SUBSTANCE INTO MOUTH AND PHARYNX, EXTERNAL APPROACH: ICD-10-PCS | Performed by: OBSTETRICS & GYNECOLOGY

## 2024-04-04 RX ORDER — ENOXAPARIN SODIUM 100 MG/ML
40 INJECTION SUBCUTANEOUS DAILY
Status: DISCONTINUED | OUTPATIENT
Start: 2024-04-05 | End: 2024-04-07

## 2024-04-04 RX ORDER — ACETAMINOPHEN 500 MG
1000 TABLET ORAL EVERY 6 HOURS
Status: DISCONTINUED | OUTPATIENT
Start: 2024-04-04 | End: 2024-04-07

## 2024-04-04 RX ORDER — NALBUPHINE HYDROCHLORIDE 10 MG/ML
10 INJECTION, SOLUTION INTRAMUSCULAR; INTRAVENOUS; SUBCUTANEOUS
Status: DISCONTINUED | OUTPATIENT
Start: 2024-04-04 | End: 2024-04-04

## 2024-04-04 RX ORDER — DIPHENHYDRAMINE HCL 25 MG
25 CAPSULE ORAL EVERY 4 HOURS PRN
Status: ACTIVE | OUTPATIENT
Start: 2024-04-04 | End: 2024-04-05

## 2024-04-04 RX ORDER — ONDANSETRON 2 MG/ML
4 INJECTION INTRAMUSCULAR; INTRAVENOUS ONCE AS NEEDED
Status: ACTIVE | OUTPATIENT
Start: 2024-04-04 | End: 2024-04-04

## 2024-04-04 RX ORDER — NALOXONE HYDROCHLORIDE 0.4 MG/ML
0.08 INJECTION, SOLUTION INTRAMUSCULAR; INTRAVENOUS; SUBCUTANEOUS
Status: ACTIVE | OUTPATIENT
Start: 2024-04-04 | End: 2024-04-05

## 2024-04-04 RX ORDER — FAMOTIDINE 10 MG/ML
20 INJECTION, SOLUTION INTRAVENOUS ONCE
Status: DISCONTINUED | OUTPATIENT
Start: 2024-04-04 | End: 2024-04-04

## 2024-04-04 RX ORDER — ONDANSETRON 2 MG/ML
4 INJECTION INTRAMUSCULAR; INTRAVENOUS EVERY 6 HOURS PRN
Status: DISCONTINUED | OUTPATIENT
Start: 2024-04-04 | End: 2024-04-07

## 2024-04-04 RX ORDER — ACETAMINOPHEN 325 MG/1
650 TABLET ORAL EVERY 6 HOURS PRN
Status: ACTIVE | OUTPATIENT
Start: 2024-04-04 | End: 2024-04-05

## 2024-04-04 RX ORDER — METHYLERGONOVINE MALEATE 0.2 MG/ML
INJECTION INTRAVENOUS
Status: DISCONTINUED
Start: 2024-04-04 | End: 2024-04-05 | Stop reason: WASHOUT

## 2024-04-04 RX ORDER — SODIUM CHLORIDE 9 MG/ML
INJECTION, SOLUTION INTRAVENOUS CONTINUOUS PRN
Status: DISCONTINUED | OUTPATIENT
Start: 2024-04-04 | End: 2024-04-04 | Stop reason: SURG

## 2024-04-04 RX ORDER — GABAPENTIN 300 MG/1
300 CAPSULE ORAL EVERY 8 HOURS PRN
Status: DISCONTINUED | OUTPATIENT
Start: 2024-04-04 | End: 2024-04-07

## 2024-04-04 RX ORDER — SIMETHICONE 80 MG
80 TABLET,CHEWABLE ORAL 3 TIMES DAILY PRN
Status: DISCONTINUED | OUTPATIENT
Start: 2024-04-04 | End: 2024-04-07

## 2024-04-04 RX ORDER — CEFAZOLIN SODIUM IN 0.9 % NACL 3 G/100 ML
3 INTRAVENOUS SOLUTION, PIGGYBACK (ML) INTRAVENOUS ONCE
Status: COMPLETED | OUTPATIENT
Start: 2024-04-04 | End: 2024-04-04

## 2024-04-04 RX ORDER — PROCHLORPERAZINE EDISYLATE 5 MG/ML
5 INJECTION INTRAMUSCULAR; INTRAVENOUS ONCE AS NEEDED
Status: ACTIVE | OUTPATIENT
Start: 2024-04-04 | End: 2024-04-04

## 2024-04-04 RX ORDER — HYDROMORPHONE HYDROCHLORIDE 1 MG/ML
0.6 INJECTION, SOLUTION INTRAMUSCULAR; INTRAVENOUS; SUBCUTANEOUS
Status: ACTIVE | OUTPATIENT
Start: 2024-04-04 | End: 2024-04-05

## 2024-04-04 RX ORDER — TRANEXAMIC ACID 10 MG/ML
INJECTION, SOLUTION INTRAVENOUS
Status: DISPENSED
Start: 2024-04-04 | End: 2024-04-05

## 2024-04-04 RX ORDER — AMMONIA INHALANTS 0.04 G/.3ML
0.3 INHALANT RESPIRATORY (INHALATION) AS NEEDED
Status: DISCONTINUED | OUTPATIENT
Start: 2024-04-04 | End: 2024-04-07

## 2024-04-04 RX ORDER — BUPIVACAINE HYDROCHLORIDE 2.5 MG/ML
20 INJECTION, SOLUTION EPIDURAL; INFILTRATION; INTRACAUDAL ONCE
Status: DISCONTINUED | OUTPATIENT
Start: 2024-04-04 | End: 2024-04-04

## 2024-04-04 RX ORDER — NALBUPHINE HYDROCHLORIDE 10 MG/ML
2.5 INJECTION, SOLUTION INTRAMUSCULAR; INTRAVENOUS; SUBCUTANEOUS
Status: DISCONTINUED | OUTPATIENT
Start: 2024-04-04 | End: 2024-04-04

## 2024-04-04 RX ORDER — LIDOCAINE HYDROCHLORIDE AND EPINEPHRINE 15; 5 MG/ML; UG/ML
INJECTION, SOLUTION EPIDURAL AS NEEDED
Status: DISCONTINUED | OUTPATIENT
Start: 2024-04-04 | End: 2024-04-04 | Stop reason: SURG

## 2024-04-04 RX ORDER — DEXAMETHASONE SODIUM PHOSPHATE 4 MG/ML
VIAL (ML) INJECTION AS NEEDED
Status: DISCONTINUED | OUTPATIENT
Start: 2024-04-04 | End: 2024-04-04 | Stop reason: SURG

## 2024-04-04 RX ORDER — DOCUSATE SODIUM 100 MG/1
100 CAPSULE, LIQUID FILLED ORAL
Status: DISCONTINUED | OUTPATIENT
Start: 2024-04-04 | End: 2024-04-07

## 2024-04-04 RX ORDER — METOCLOPRAMIDE HYDROCHLORIDE 5 MG/ML
10 INJECTION INTRAMUSCULAR; INTRAVENOUS ONCE
Status: DISCONTINUED | OUTPATIENT
Start: 2024-04-04 | End: 2024-04-04

## 2024-04-04 RX ORDER — KETOROLAC TROMETHAMINE 30 MG/ML
30 INJECTION, SOLUTION INTRAMUSCULAR; INTRAVENOUS EVERY 6 HOURS
Status: DISPENSED | OUTPATIENT
Start: 2024-04-04 | End: 2024-04-05

## 2024-04-04 RX ORDER — METOCLOPRAMIDE 10 MG/1
10 TABLET ORAL ONCE
Status: DISCONTINUED | OUTPATIENT
Start: 2024-04-04 | End: 2024-04-04

## 2024-04-04 RX ORDER — MORPHINE SULFATE 1 MG/ML
INJECTION, SOLUTION EPIDURAL; INTRATHECAL; INTRAVENOUS AS NEEDED
Status: DISCONTINUED | OUTPATIENT
Start: 2024-04-04 | End: 2024-04-04 | Stop reason: SURG

## 2024-04-04 RX ORDER — KETOROLAC TROMETHAMINE 30 MG/ML
30 INJECTION, SOLUTION INTRAMUSCULAR; INTRAVENOUS ONCE
Status: COMPLETED | OUTPATIENT
Start: 2024-04-04 | End: 2024-04-04

## 2024-04-04 RX ORDER — DIPHENHYDRAMINE HYDROCHLORIDE 50 MG/ML
12.5 INJECTION INTRAMUSCULAR; INTRAVENOUS EVERY 4 HOURS PRN
Status: ACTIVE | OUTPATIENT
Start: 2024-04-04 | End: 2024-04-05

## 2024-04-04 RX ORDER — NALBUPHINE HYDROCHLORIDE 10 MG/ML
2.5 INJECTION, SOLUTION INTRAMUSCULAR; INTRAVENOUS; SUBCUTANEOUS EVERY 4 HOURS PRN
Status: ACTIVE | OUTPATIENT
Start: 2024-04-04 | End: 2024-04-05

## 2024-04-04 RX ORDER — SODIUM CHLORIDE, SODIUM LACTATE, POTASSIUM CHLORIDE, CALCIUM CHLORIDE 600; 310; 30; 20 MG/100ML; MG/100ML; MG/100ML; MG/100ML
INJECTION, SOLUTION INTRAVENOUS CONTINUOUS
Status: DISCONTINUED | OUTPATIENT
Start: 2024-04-04 | End: 2024-04-07

## 2024-04-04 RX ORDER — IBUPROFEN 600 MG/1
600 TABLET ORAL EVERY 6 HOURS
Status: DISCONTINUED | OUTPATIENT
Start: 2024-04-05 | End: 2024-04-07

## 2024-04-04 RX ORDER — ONDANSETRON 2 MG/ML
INJECTION INTRAMUSCULAR; INTRAVENOUS AS NEEDED
Status: DISCONTINUED | OUTPATIENT
Start: 2024-04-04 | End: 2024-04-04 | Stop reason: SURG

## 2024-04-04 RX ORDER — DEXTROSE, SODIUM CHLORIDE, SODIUM LACTATE, POTASSIUM CHLORIDE, AND CALCIUM CHLORIDE 5; .6; .31; .03; .02 G/100ML; G/100ML; G/100ML; G/100ML; G/100ML
INJECTION, SOLUTION INTRAVENOUS CONTINUOUS
Status: DISCONTINUED | OUTPATIENT
Start: 2024-04-04 | End: 2024-04-07

## 2024-04-04 RX ORDER — HYDROCODONE BITARTRATE AND ACETAMINOPHEN 7.5; 325 MG/1; MG/1
2 TABLET ORAL EVERY 6 HOURS PRN
Status: ACTIVE | OUTPATIENT
Start: 2024-04-04 | End: 2024-04-05

## 2024-04-04 RX ORDER — CARBOPROST TROMETHAMINE 250 UG/ML
INJECTION, SOLUTION INTRAMUSCULAR
Status: DISCONTINUED
Start: 2024-04-04 | End: 2024-04-05 | Stop reason: WASHOUT

## 2024-04-04 RX ORDER — TRANEXAMIC ACID 10 MG/ML
INJECTION, SOLUTION INTRAVENOUS AS NEEDED
Status: DISCONTINUED | OUTPATIENT
Start: 2024-04-04 | End: 2024-04-04 | Stop reason: SURG

## 2024-04-04 RX ORDER — ENOXAPARIN SODIUM 100 MG/ML
40 INJECTION SUBCUTANEOUS DAILY
Status: DISCONTINUED | OUTPATIENT
Start: 2024-04-05 | End: 2024-04-04

## 2024-04-04 RX ORDER — HYDROXYZINE HYDROCHLORIDE 50 MG/ML
50 INJECTION, SOLUTION INTRAMUSCULAR EVERY 4 HOURS PRN
Status: DISCONTINUED | OUTPATIENT
Start: 2024-04-04 | End: 2024-04-04

## 2024-04-04 RX ORDER — MIDAZOLAM HYDROCHLORIDE 1 MG/ML
INJECTION INTRAMUSCULAR; INTRAVENOUS AS NEEDED
Status: DISCONTINUED | OUTPATIENT
Start: 2024-04-04 | End: 2024-04-04 | Stop reason: SURG

## 2024-04-04 RX ORDER — LIDOCAINE HYDROCHLORIDE 10 MG/ML
INJECTION, SOLUTION EPIDURAL; INFILTRATION; INTRACAUDAL; PERINEURAL AS NEEDED
Status: DISCONTINUED | OUTPATIENT
Start: 2024-04-04 | End: 2024-04-04 | Stop reason: SURG

## 2024-04-04 RX ORDER — HYDROCODONE BITARTRATE AND ACETAMINOPHEN 7.5; 325 MG/1; MG/1
1 TABLET ORAL EVERY 6 HOURS PRN
Status: ACTIVE | OUTPATIENT
Start: 2024-04-04 | End: 2024-04-05

## 2024-04-04 RX ORDER — NALBUPHINE HYDROCHLORIDE 10 MG/ML
2.5 INJECTION, SOLUTION INTRAMUSCULAR; INTRAVENOUS; SUBCUTANEOUS
Status: DISCONTINUED | OUTPATIENT
Start: 2024-04-04 | End: 2024-04-07

## 2024-04-04 RX ORDER — FAMOTIDINE 20 MG/1
20 TABLET, FILM COATED ORAL ONCE
Status: DISCONTINUED | OUTPATIENT
Start: 2024-04-04 | End: 2024-04-04

## 2024-04-04 RX ORDER — HYDROMORPHONE HYDROCHLORIDE 1 MG/ML
0.4 INJECTION, SOLUTION INTRAMUSCULAR; INTRAVENOUS; SUBCUTANEOUS
Status: ACTIVE | OUTPATIENT
Start: 2024-04-04 | End: 2024-04-05

## 2024-04-04 RX ORDER — MISOPROSTOL 200 UG/1
TABLET ORAL
Status: DISCONTINUED
Start: 2024-04-04 | End: 2024-04-05 | Stop reason: WASHOUT

## 2024-04-04 RX ORDER — HALOPERIDOL 5 MG/ML
0.5 INJECTION INTRAMUSCULAR ONCE AS NEEDED
Status: ACTIVE | OUTPATIENT
Start: 2024-04-04 | End: 2024-04-04

## 2024-04-04 RX ORDER — BISACODYL 10 MG
10 SUPPOSITORY, RECTAL RECTAL ONCE AS NEEDED
Status: DISCONTINUED | OUTPATIENT
Start: 2024-04-04 | End: 2024-04-07

## 2024-04-04 RX ORDER — BUPIVACAINE HCL/0.9 % NACL/PF 0.25 %
5 PLASTIC BAG, INJECTION (ML) EPIDURAL AS NEEDED
Status: DISCONTINUED | OUTPATIENT
Start: 2024-04-04 | End: 2024-04-04

## 2024-04-04 RX ADMIN — DEXAMETHASONE SODIUM PHOSPHATE 8 MG: 4 MG/ML VIAL (ML) INJECTION at 20:33:00

## 2024-04-04 RX ADMIN — SODIUM CHLORIDE: 9 INJECTION, SOLUTION INTRAVENOUS at 20:26:00

## 2024-04-04 RX ADMIN — CEFAZOLIN SODIUM IN 0.9 % NACL 3 G: 3 G/100 ML INTRAVENOUS SOLUTION, PIGGYBACK (ML) INTRAVENOUS at 20:28:00

## 2024-04-04 RX ADMIN — TRANEXAMIC ACID 1000 MG: 10 INJECTION, SOLUTION INTRAVENOUS at 20:28:00

## 2024-04-04 RX ADMIN — MIDAZOLAM HYDROCHLORIDE 2 MG: 1 INJECTION INTRAMUSCULAR; INTRAVENOUS at 20:26:00

## 2024-04-04 RX ADMIN — LIDOCAINE HYDROCHLORIDE AND EPINEPHRINE 5 ML: 15; 5 INJECTION, SOLUTION EPIDURAL at 14:51:00

## 2024-04-04 RX ADMIN — MORPHINE SULFATE 6 MG: 1 INJECTION, SOLUTION EPIDURAL; INTRATHECAL; INTRAVENOUS at 20:36:00

## 2024-04-04 RX ADMIN — LIDOCAINE HYDROCHLORIDE 3 ML: 10 INJECTION, SOLUTION EPIDURAL; INFILTRATION; INTRACAUDAL; PERINEURAL at 14:49:00

## 2024-04-04 RX ADMIN — MORPHINE SULFATE 3 MG: 1 INJECTION, SOLUTION EPIDURAL; INTRATHECAL; INTRAVENOUS at 20:35:00

## 2024-04-04 RX ADMIN — ONDANSETRON 4 MG: 2 INJECTION INTRAMUSCULAR; INTRAVENOUS at 20:33:00

## 2024-04-04 NOTE — ANESTHESIA PROCEDURE NOTES
Labor Analgesia    Date/Time: 4/4/2024 2:45 PM    Performed by: Dulce Maria Solano MD  Authorized by: Dulce Maria Solano MD      General Information and Staff    Start Time:  4/4/2024 2:45 PM  End Time:  4/4/2024 2:55 PM  Anesthesiologist:  Dulce Maria Solano MD  Performed by:  Anesthesiologist  Patient Location:  OB  Site Identification: surface landmarks    Reason for Block: labor epidural    Preanesthetic Checklist: patient identified, IV checked, site marked, risks and benefits discussed, monitors and equipment checked, pre-op evaluation, timeout performed, anesthesia consent and sterile technique used      Procedure Details    Patient Position:  Sitting  Prep: ChloraPrep and patient draped    Monitoring:  Heart rate and continuous pulse ox  Approach:  Midline    Epidural Needle    Injection Technique:  LEN air  Needle Type:  Tuohy  Needle Gauge:  18 G  Needle Length:  3.5 in  Needle Insertion Depth:  6  Location:  L3-4    Spinal Needle      Catheter    Catheter Type:  Multi-orifice  Catheter Size:  20 G  Catheter at Skin Depth:  12  Test Dose:  Negative    Assessment    Sensory Level:  T6    Additional Comments

## 2024-04-04 NOTE — ANESTHESIA PREPROCEDURE EVALUATION
Anesthesia PreOp Note    HPI:     Ansley Colorado is a 34 year old female who presents for preoperative consultation requested by: * No surgeons listed *    Date of Surgery: 2024    * No procedures listed *  Indication: * No pre-op diagnosis entered *    Relevant Problems   No relevant active problems       NPO:                         History Review:  Patient Active Problem List    Diagnosis Date Noted    Abnormal ultrasonic finding on  screening of mother 2024    Request for sterilization 2023    Monochorionic diamniotic twin pregnancy, antepartum ([facility]) 2023    Morbid obesity with BMI of 40.0-44.9, adult ([facility]) 2019    Sickle cell trait ([facility]) 2017    Prenatal exposure to HIV affecting pregnancy management 2017       Past Medical History:   Diagnosis Date    Chicken pox     Childhood    Sickle cell trait ([facility])        Past Surgical History:   Procedure Laterality Date    WISDOM TEETH REMOVED             Medications Prior to Admission   Medication Sig Dispense Refill Last Dose    prenatal vitamin with DHA 27-0.8-228 MG Oral Cap Take 1 capsule by mouth daily.   Past Week    fluconazole (DIFLUCAN) 150 MG Oral Tab Take 1 tablet (150 mg total) by mouth every 3 (three) days. Take one now and repeat in 72 hours (Patient not taking: Reported on 2023) 2 tablet 0      Current Facility-Administered Medications Ordered in Epic   Medication Dose Route Frequency Provider Last Rate Last Admin    oxyTOCIN in sodium chloride 0.9% (Pitocin) 30 Units/500mL infusion premix  0.5-20 manohar-units/min Intravenous Continuous Alisha Dunham MD   Stopped at 24 1400    fentaNYL-bupivacaine 2 mcg/mL-0.125% in sodium chloride 0.9% 100 mL EPIDURAL infusion premix   Epidural Continuous Dulce Maria Solano MD        fentaNYL (Sublimaze) 50 mcg/mL injection 100 mcg  100 mcg Epidural Once Dulce Maria Solano MD        bupivacaine PF (Marcaine) 0.25% injection  20 mL Epidural Once  Dulce Maria Solano MD        EPHEDrine (PF) 25 MG/5 ML injection 5 mg  5 mg Intravenous PRN Dulce Maria Solano MD        nalbuphine (Nubain) 10 mg/mL injection 2.5 mg  2.5 mg Intravenous Q15 Min PRN Dulce Maria Solano MD        nalbuphine (Nubain) 10 mg/mL injection 10 mg  10 mg Intravenous Q4H PRN Dubyel, Jessie T, DO   10 mg at 04/03/24 0334    hydrOXYzine 50 mg/mL injection 50 mg  50 mg Intramuscular Q4H PRN Dubyel, Jessie T, DO   50 mg at 04/03/24 0332    lactated ringers infusion   Intravenous Continuous Dubyel, Jessie T, DO   Stopped at 04/04/24 1400    acetaminophen (Tylenol Extra Strength) tab 500 mg  500 mg Oral Q6H PRN Dubyel, Jessie T, DO        Or    acetaminophen (Tylenol Extra Strength) tab 1,000 mg  1,000 mg Oral Q6H PRN Dubyel, Jessie T, DO        zolpidem (Ambien) tab 5 mg  5 mg Oral Nightly PRN Dubyel, Jessie T, DO        docusate sodium (Colace) cap 100 mg  100 mg Oral BID Dubyel, Jessie T, DO        calcium carbonate (Tums) chewable tab 1,000 mg  1,000 mg Oral Daily PRN Dubyel, Jessie T, DO        prenatal vitamin with DHA (PNV with DHA) cap 1 capsule  1 capsule Oral Daily Dubyel, Jessie T, DO         No current Epic-ordered outpatient medications on file.       Allergies   Allergen Reactions    Nickel RASH    Soap RASH     DOVE SOAP and TIDE LAUNDRY DETERGENT        Family History   Problem Relation Age of Onset    Diabetes Father     Diabetes Maternal Grandmother     Heart Disorder Maternal Grandmother     Hypertension Maternal Grandmother     Cancer Maternal Grandfather     Diabetes Maternal Grandfather     Diabetes Paternal Grandmother     Cancer Paternal Grandmother         Ovarian/uterine    Other (congestive heart failure) Paternal Grandmother     Prostate Cancer Paternal Grandfather      Social History     Socioeconomic History    Marital status: Single     Spouse name: Angus Farah    Number of children: 3    Years of education: 16   Occupational History    Occupation:      Comment: Full time     Occupation: RN     Comment: [employer]   Tobacco Use    Smoking status: Never    Smokeless tobacco: Never   Vaping Use    Vaping Use: Never used   Substance and Sexual Activity    Alcohol use: Not Currently     Alcohol/week: 1.0 standard drink of alcohol     Types: 1 Standard drinks or equivalent per week    Drug use: No   Other Topics Concern     Service No    Blood Transfusions No    Caffeine Concern No    Occupational Exposure No    Hobby Hazards No    Sleep Concern No    Stress Concern No    Weight Concern No    Special Diet No    Back Care No    Exercise No    Bike Helmet No    Seat Belt Yes    Self-Exams No       Available pre-op labs reviewed.  Lab Results   Component Value Date    WBC 6.8 04/02/2024    RBC 4.23 04/02/2024    HGB 11.4 (L) 04/02/2024    HCT 33.9 (L) 04/02/2024    MCV 80.1 04/02/2024    MCH 27.0 04/02/2024    MCHC 33.6 04/02/2024    RDW 13.5 04/02/2024    .0 04/02/2024     Lab Results   Component Value Date     04/02/2024    K 4.1 04/02/2024     04/02/2024    CO2 21.0 04/02/2024    BUN 6 (L) 04/02/2024    CREATSERUM 0.69 04/02/2024    GLU 61 (L) 04/02/2024    CA 9.0 04/02/2024          Vital Signs:  Body mass index is 45.76 kg/m².   height is 1.651 m (5' 5\") and weight is 124.7 kg (275 lb). Her axillary temperature is 97.6 °F (36.4 °C). Her blood pressure is 126/70 and her pulse is 82. Her respiration is 18 and oxygen saturation is 100%.   Vitals:    04/04/24 0915 04/04/24 1114 04/04/24 1115 04/04/24 1130   BP: 139/73 140/85 140/85 126/70   Pulse: 92 85 85 82   Resp:       Temp:    97.6 °F (36.4 °C)   TempSrc:    Axillary   SpO2:       Weight:       Height:            Anesthesia Evaluation     Patient summary reviewed and Nursing notes reviewed    Airway   Mallampati: II  TM distance: >3 FB  Neck ROM: full  Dental      Pulmonary - negative ROS and normal exam   Cardiovascular - negative ROS and normal exam  Exercise tolerance: good    Neuro/Psych - negative ROS      GI/Hepatic/Renal - negative ROS     Endo/Other - negative ROS     Comments: pregnancy  Abdominal  - normal exam     Other findings: pregnancy            Anesthesia Plan:   ASA:  2  Emergent    Plan:   Epidural  Plan Comments: Discussed risks of neuraxial anesthesia, including, but not limited to: failure, backache, unilateral/patchy block, difficulty breathing, bleeding, infection, neurologic injury, post dural puncture headache, paralysis, and death. Patient understands risks and wishes to proceed with neuraxial anesthesia.        I have informed Ansley Colorado and/or legal guardian or family member of the nature of the anesthetic plan, benefits, risks including possible dental damage if relevant, major complications, and any alternative forms of anesthetic management.   All of the patient's questions were answered to the best of my ability. The patient desires the anesthetic management as planned.  Dulce Maria Solano MD  2024 2:58 PM  Present on Admission:   Abnormal ultrasonic finding on  screening of mother   Monochorionic diamniotic twin pregnancy, antepartum ([facility])

## 2024-04-05 PROBLEM — O30.93: Chronic | Status: ACTIVE | Noted: 2024-04-04

## 2024-04-05 PROBLEM — O32.9XX0: Chronic | Status: ACTIVE | Noted: 2024-04-04

## 2024-04-05 LAB
ATRIAL RATE: 97 BPM
BASOPHILS # BLD AUTO: 0.02 X10(3) UL (ref 0–0.2)
BASOPHILS NFR BLD AUTO: 0.1 %
DEPRECATED RDW RBC AUTO: 40.8 FL (ref 35.1–46.3)
EOSINOPHIL # BLD AUTO: 0 X10(3) UL (ref 0–0.7)
EOSINOPHIL NFR BLD AUTO: 0 %
ERYTHROCYTE [DISTWIDTH] IN BLOOD BY AUTOMATED COUNT: 13.8 % (ref 11–15)
HCT VFR BLD AUTO: 24 %
HGB BLD-MCNC: 8.4 G/DL
IMM GRANULOCYTES # BLD AUTO: 0.1 X10(3) UL (ref 0–1)
IMM GRANULOCYTES NFR BLD: 0.6 %
LYMPHOCYTES # BLD AUTO: 1.27 X10(3) UL (ref 1–4)
LYMPHOCYTES NFR BLD AUTO: 7.2 %
MCH RBC QN AUTO: 28.9 PG (ref 26–34)
MCHC RBC AUTO-ENTMCNC: 35 G/DL (ref 31–37)
MCV RBC AUTO: 82.5 FL
MONOCYTES # BLD AUTO: 0.94 X10(3) UL (ref 0.1–1)
MONOCYTES NFR BLD AUTO: 5.4 %
NEUTROPHILS # BLD AUTO: 15.21 X10 (3) UL (ref 1.5–7.7)
NEUTROPHILS # BLD AUTO: 15.21 X10(3) UL (ref 1.5–7.7)
NEUTROPHILS NFR BLD AUTO: 86.7 %
P AXIS: 28 DEGREES
P-R INTERVAL: 160 MS
PLATELET # BLD AUTO: 204 10(3)UL (ref 150–450)
Q-T INTERVAL: 392 MS
QRS DURATION: 74 MS
QTC CALCULATION (BEZET): 463 MS
R AXIS: 15 DEGREES
RBC # BLD AUTO: 2.91 X10(6)UL
T AXIS: 28 DEGREES
VENTRICULAR RATE: 84 BPM
WBC # BLD AUTO: 17.5 X10(3) UL (ref 4–11)

## 2024-04-05 NOTE — OPERATIVE REPORT
[facility]    PATIENT'S NAME: DIANE MINA   ATTENDING PHYSICIAN: Jessie Kurtz DO   OPERATING PHYSICIAN: Alisha Dunham MD   PATIENT ACCOUNT#:   [id number]    LOCATION:  [address] [place] [address] [facility]  MEDICAL RECORD #:   [id number]       YOB: 1990  ADMISSION DATE:       2024      OPERATION DATE:  2024    OPERATIVE REPORT      PREOPERATIVE DIAGNOSIS:  Twin gestation.  POSTOPERATIVE DIAGNOSIS:  Malpresentation of twin B.  PROCEDURE:  Primary low transverse  section.      ASSISTANT SURGEON:  Rox Slater M.D., who was helpful throughout the procedure and providing exposure. The involvement of the assisting physician was necessary in order to provide aid in exposure/retraction, hemostasis, closure, and other intraoperative technical functions in order to facilitate me as the primary surgeon carry out a safe operation with optimized results/outcome.    COMPLICATIONS:  None.      SPECIMEN:  Placenta and cord gas to Pathology.    OPERATIVE TECHNIQUE:  Patient is a 34-year-old,  5, para 3, with a monochorionic, diamniotic twin gestation, who presented for induction of labor after abnormal Dopplers.  She received Cytotec and progressed to completely dilated, was brought to the OR and quickly delivered twin A in vertex presentation.  There was 1-minute delayed cord clamping.  Cord was clamped.      At this point, ultrasound was performed.  The twin B was noted to be with the head at the maternal right lower quadrant, back down, in transverse lie.  Attempts were made to switch the head to vertex as well as tried to grab the feet in order to perform a breech extraction.  However, during that, there was spontaneous rupture of membranes with prolapse of left fetal arm and shoulder into the pelvis.  Once again, attempts were made to manually rotate the baby, however, unsuccessful and decision was made to proceed with primary  for fetal malpresentation of twin B.   Verbal consent was received from the patient. Alfaro catheter was inserted.  The abdomen was prepped and draped.  Anesthesia put the patient under general anesthesia.      A Pfannenstiel incision was made with the use of a knife.  That incision was carried down to the fascia which was entered via the use of a knife.  That incision was extended superiorly and inferiorly with the use of sharp and blunt dissection.  The rectus muscles were .  The peritoneum was entered bluntly.  A bladder blade was inserted.  The uterus was entered with the use of a knife.  The baby was delivered by elevating the buttocks into the incision and delivering the baby in that position in a breech presentation.  The cord was quickly clamped.  Infant was passed off.  Placenta was manually removed.  The inside of the uterus was curetted with moist laparotomy sponges, grasped with ring forceps.  The uterus was closed in layers with a running and locking stitch of 0 Vicryl for the first layer.  Areas of poor hemostasis were treated with figure-of-eight stitches of 0 Vicryl, which were adequate.  The uterus was internalized and again checked for hemostasis.  The gutters were checked.  The rectus muscles were inspected for hemostasis.  The fascia was closed with 1 running stitch of 0 Vicryl.  Subcutaneous tissue was reapproximated with 2-0 plain gut and the skin was closed with a running subcuticular stitch of 4-0 Vicryl.  All sponge, needle, and instrument counts were correct.  The patient was extubated and taken to recovery room in stable condition.    Dictated By Alisha Dunham MD  d: 04/04/2024 21:18:00  t: 04/05/2024 04:55:42  [facility] [id number]  LC/    cc: Jessie Kurtz DO

## 2024-04-05 NOTE — PLAN OF CARE
Problem: Patient Centered Care  Goal: Patient preferences are identified and integrated in the patient's plan of care  Description: Interventions:  - What would you like us to know as we care for you?   - Provide timely, complete, and accurate information to patient/family  - Incorporate patient and family knowledge, values, beliefs, and cultural backgrounds into the planning and delivery of care  - Encourage patient/family to participate in care and decision-making at the level they choose  - Honor patient and family perspectives and choices  Outcome: Progressing     Problem: Patient/Family Goals  Goal: Patient/Family Long Term Goal  Description: Patient's Long Term Goal:     Interventions:  -   - See additional Care Plan goals for specific interventions  Outcome: Progressing  Goal: Patient/Family Short Term Goal  Description: Patient's Short Term Goal:     Interventions:   -   - See additional Care Plan goals for specific interventions  Outcome: Progressing     Problem: GENITOURINARY - ADULT  Goal: Absence of urinary retention  Description: INTERVENTIONS:  - Assess patient’s ability to void and empty bladder  - Monitor intake/output and perform bladder scan as needed  - Follow urinary retention protocol/standard of care  - Consider collaborating with pharmacy to review patient's medication profile  - Implement strategies to promote bladder emptying  Outcome: Progressing     Problem: SKIN/TISSUE INTEGRITY - ADULT  Goal: Skin integrity remains intact  Description: INTERVENTIONS  - Assess and document risk factors for pressure ulcer development  - Assess and document skin integrity  - Monitor for areas of redness and/or skin breakdown  - Initiate interventions, skin care algorithm/standards of care as needed  Outcome: Progressing  Goal: Incision(s), wounds(s) or drain site(s) healing without S/S of infection  Description: INTERVENTIONS:  - Assess and document risk factors for pressure ulcer development  - Assess  and document skin integrity  - Assess and document dressing/incision, wound bed, drain sites and surrounding tissue  - Implement wound care per orders  - Initiate isolation precautions as appropriate  - Initiate Pressure Ulcer prevention bundle as indicated  Outcome: Progressing  Goal: Oral mucous membranes remain intact  Description: INTERVENTIONS  - Assess oral mucosa and hygiene practices  - Implement preventative oral hygiene regimen  - Implement oral medicated treatments as ordered  Outcome: Progressing

## 2024-04-05 NOTE — PROGRESS NOTES
Patient transferred to mother/baby room 369 per cart in stable condition with personal belongings.  Accompanied by care partner and staff.  Report given to mother/baby RN.

## 2024-04-05 NOTE — ANESTHESIA PROCEDURE NOTES
Airway  Date/Time: 4/4/2024 8:20 PM  Urgency: Elective    Airway not difficult    General Information and Staff    Patient location during procedure: OR  Anesthesiologist: Dulce Maria Solano MD  Performed: anesthesiologist   Performed by: Dulce Maria Solano MD  Authorized by: Dulce Maria Solano MD      Indications and Patient Condition  Indications for airway management: anesthesia  Sedation level: deep  Preoxygenated: yes  Patient position: sniffing  Mask difficulty assessment: 0 - not attempted    Final Airway Details  Final airway type: endotracheal airway      Successful airway: ETT  Cuffed: yes   Successful intubation technique: direct laryngoscopy  Facilitating devices/methods: rapid sequence intubation  Endotracheal tube insertion site: oral  Blade: Jerica  Blade size: #3  ETT size (mm): 7.0    Cormack-Lehane Classification: grade I - full view of glottis  Placement verified by: capnometry   Measured from: teeth  Number of attempts at approach: 1

## 2024-04-05 NOTE — PROGRESS NOTES
Received patient into room via cart and accompanied by significant other as well as nursing staff.  Pt transferred to bed. VSS WNL. Patient oriented to unit, room, and call light within reach. POC reviewed with patient. Instructed to call for assistance when ready to void. Report received from RNs Sarah.

## 2024-04-05 NOTE — DISCHARGE INSTRUCTIONS
Call if fever greater than 101, worsening pain, nausea or vomiting, heavy vaginal bleeding.    No sex tampons or douching for 6 weeks    No lifting greater than 20 lbs    It is ok to shower, but do not soak your wound.    You may drive once you are no longer taking narcotics ([place]).    Please call if you have persistent headaches, worsening swelling, pain under your right ribs, or blurry vision.    Please let us know if you are having any signs or symptoms of post partum depression - persistent sadness, feelings of hopelessness or worthlessness, sleeping or eating too much or too little, feeling like you would like to hurt yourself.   Other Discharge Instructions:           Post  Section Home Care Instructions   Pelvic rest for 6 weeks. No sex, tampons, baths or swimming. May shower.  No heavy lifting, nothing greater then 10-15 pounds.  No strenuous activity.  No driving for at least 2 weeks and you are no longer taking narcotics (norco).  Do not soak your incision/wound. Look at incision at least 2 times a day. If any signs/symptoms of infection, let OB office know (redness, swelling, pain, foul smelling odor or discharge).  Call MD for any questions or concerns, temp over 100.4, increased pain, increased bleeding, foul smelling odor/discharge from vagina/incision or signs of postpartum depression.      We hope you were pleased with your care at [facility].  We wish you the best outcome and overall experience with the delivery of your baby.  These instructions will help to minimize pain, limit the risk for an infection, and improve the likelihood of a successful recovery.    What to Expect:  Abdominal cramping after delivery especially if you are breastfeeding.   Vaginal bleeding for about 4-6 weeks that may be followed by a yellow or white discharge for a few more weeks.  Your period will resume in approximately 6-8 weeks, unless you are breastfeeding.    If you are bottle feeding, you  may notice breast engorgement in about 3 days.  Your breast may be sore and hard. Please wear a tight fitted bra or sports bra for 24-36 hours to help prevent your breast from producing milk, and use ice packs to relive any discomfort.  If you are breastfeeding, nipple dryness is very common the first few days.    Constipation is common after having a baby.  Please increase fluid and fiber in your diet.      Over-The-Counter Medication  Non-prescription anti-inflammatory medications can also help to ease the pain.  You may take Aleve, Tylenol or Ibuprofen   Colace or Metamucil for Constipation  Lanolin for dry nipples  Tucks, Witch Hazel and Epifoam for vaginal/perineum discomfort.   Drink a full glass of water with oral medication and take as directed.    Wound Care  The following instructions will promote proper healing and help to prevent infection  Please use soap and water over incision   Pat your incision dry and leave open to air if possible   If you have steri - strips, then please remove after 4-5 days from your surgery. You may remove after a shower to decrease discomfort.   Do not replace the Steri-Strips, if they come off.  If the tapes come off, leave them off and keep the incision clean.  You do not need to cover the incision or put any medications on the incision.    Bathing/Showers  You may resume showers  No baths, swimming, hot tubs until your post-partum visit    Home Medication  Resume your home medications as instructed    Diet  Resume your normal diet    Activity  Refrain from vaginal intercourse, vaginal suppositories, tampon use or douches until after your post-partum visit  No exercising for 4 weeks  You may climb stairs minimally for the 1st week.    Do not do heavy housework for at least 2-3 weeks    Return to Work or School  You may return to work in 6-8 weeks  Contact your obstetrician’s office, if you need a medical release. ([phone])    Driving  Avoid driving for 1-2 weeks or  sooner if not taking narcotics.    Follow-up Appointment with Your Obstetrician  Call your obstetrician’s office today for an appointment in four weeks.    The number is [phone].  Verify your appointment date, day, time, and location.  At your 1st post-partum office visit:  Your progress will be evaluated, findings reviewed, and any additional concerns and instructions will be discussed.    Questions or Concerns  Call your obstetrician’s office if you experience the following:  Severe pain not controlled by pain medication  Foul smelling vaginal discharge  Heavy bleeding  Shortness of breath  Fever  Redness, increased swelling or drainage from your incision  Crying and periods of sadness that prevents you from caring for yourself and your baby  Burning sensation during urination or inability to urinate  Swelling, redness or abnormal warmth to your leg/calf  Please call [phone]. If your call is made after office hours, a physician will be available to help you.  There is always a provider covering our patients.    Thank you for coming to [facility] to start your new family.  The nurses, obstetricians, and the anesthesiologists try very hard to make sure you receive the best care possible.  Your trust in them as well as us is greatly appreciated.    Thanks so much,   [facility]

## 2024-04-05 NOTE — BRIEF OP NOTE
Pre-Operative Diagnosis: Other - Add Comments     Post-Operative Diagnosis: * No post-op diagnosis entered *      Procedure Performed:    SECTION    Surgeon(s) and Role:     * Alisha Dunham MD - Primary     * Rox Slater MD - Assisting Surgeon    Assistant(s):        Surgical Findings: Twin A quickly delivered vaginally. Twin B with hand and shoulder presentation, unable to rotate. Normal tubes/ovaries bilaterally     Specimen: cord gases to path     Estimated Blood Loss: No data recorded        Alisha Dunham MD  2024  9:04 PM

## 2024-04-05 NOTE — ANESTHESIA POSTPROCEDURE EVALUATION
Patient: Ansley Colorado    Procedure Summary       Date: 24 Room / Location: [facility] L[facility]D OR    Anesthesia Start: 1443 Anesthesia Stop:     Procedure:  SECTION (Abdomen) Diagnosis: (Other - Add Comments)    Surgeons: Alisha Dunham MD Anesthesiologist: Dulce Maria Solano MD    Anesthesia Type: epidural ASA Status: 2 - Emergent            Anesthesia Type: epidural    Vitals Value Taken Time   /45 24   Temp 97.9 °F (36.6 °C) 24   Pulse 97 24   Resp 22 24   SpO2 93 % 24   Vitals shown include unfiled device data.    EM AN Post Evaluation:   Patient Evaluated in PACU  Patient Participation: complete - patient participated  Level of Consciousness: awake and alert  Pain Score: 0  Pain Management: adequate  Airway Patency:patent  Dental exam unchanged from preop  Yes    Nausea/Vomiting: none  Cardiovascular Status: acceptable  Respiratory Status: acceptable  Postoperative Hydration acceptable      Dulce Maria Solano MD  2024 9:11 PM

## 2024-04-05 NOTE — LACTATION NOTE
LACTATION NOTE - MOTHER      Evaluation Type: Inpatient    Problems identified  Problems identified: Knowledge deficit;Milk supply not WNL;Multiple birth  Milk supply not WNL: Reduced (potential)  Problems Identified Other: 34 week twins in scn    Maternal history  Maternal history: Obesity;Caesarean section  Other/comment: FOB HIV positive, sickle cell trait    Breastfeeding goal  Breastfeeding goal: To maintain breast milk feeding per patient goal    Maternal Assessment  Bilateral Breasts: Soft;Symmetrical  Bilateral Nipples: WNL  Prior breastfeeding experience (comment below): Multip;Successful  Breastfeeding Assistance: Pumping assistance provided with permission    Pain assessment  Location/Comment: denies    Guidelines for use of:  Breast pump type: Ameda Platinum;Hand Pump  Suggested use of pump: Pump 8-12X/24hr              Infants in [facility]. Discussed pump settings, assembly, and proper flange size.  Educated patient about supply/demand and the importance of frequent stimulation. Encouraged to call LC if assistance with breastfeeding is needed. Set up with hand pump & electric, got about 7 ml with first pump session with hand pump.

## 2024-04-05 NOTE — PLAN OF CARE
Problem: GENITOURINARY - ADULT  Goal: Absence of urinary retention  Description: INTERVENTIONS:  - Assess patient’s ability to void and empty bladder  - Monitor intake/output and perform bladder scan as needed  - Follow urinary retention protocol/standard of care  - Consider collaborating with pharmacy to review patient's medication profile  - Implement strategies to promote bladder emptying  Outcome: Progressing     Problem: SKIN/TISSUE INTEGRITY - ADULT  Goal: Skin integrity remains intact  Description: INTERVENTIONS  - Assess and document risk factors for pressure ulcer development  - Assess and document skin integrity  - Monitor for areas of redness and/or skin breakdown  - Initiate interventions, skin care algorithm/standards of care as needed  Outcome: Progressing  Goal: Incision(s), wounds(s) or drain site(s) healing without S/S of infection  Description: INTERVENTIONS:  - Assess and document risk factors for pressure ulcer development  - Assess and document skin integrity  - Assess and document dressing/incision, wound bed, drain sites and surrounding tissue  - Implement wound care per orders  - Initiate isolation precautions as appropriate  - Initiate Pressure Ulcer prevention bundle as indicated  Outcome: Progressing  Goal: Oral mucous membranes remain intact  Description: INTERVENTIONS  - Assess oral mucosa and hygiene practices  - Implement preventative oral hygiene regimen  - Implement oral medicated treatments as ordered  Outcome: Progressing     Problem: GASTROINTESTINAL - ADULT  Goal: Minimal or absence of nausea and vomiting  Description: INTERVENTIONS:  - Maintain adequate hydration with IV or PO as ordered and tolerated  - Nasogastric tube to low intermittent suction as ordered  - Evaluate effectiveness of ordered antiemetic medications  - Provide nonpharmacologic comfort measures as appropriate  - Advance diet as tolerated, if ordered  - Obtain nutritional consult as needed  - Evaluate fluid  balance  Outcome: Progressing  Goal: Maintains or returns to baseline bowel function  Description: INTERVENTIONS:  - Assess bowel function  - Maintain adequate hydration with IV or PO as ordered and tolerated  - Evaluate effectiveness of GI medications  - Encourage mobilization and activity  - Obtain nutritional consult as needed  - Establish a toileting routine/schedule  - Consider collaborating with pharmacy to review patient's medication profile  Outcome: Progressing  Goal: Maintains adequate nutritional intake (undernourished)  Description: INTERVENTIONS:  - Monitor percentage of each meal consumed  - Identify factors contributing to decreased intake, treat as appropriate  - Assist with meals as needed  - Monitor I&O, WT and lab values  - Obtain nutritional consult as needed  - Optimize oral hygiene and moisture  - Encourage food from home; allow for food preferences  - Enhance eating environment  Outcome: Progressing  Goal: Achieves appropriate nutritional intake (bariatric)  Description: INTERVENTIONS:  - Monitor for over-consumption  - Identify factors contributing to increased intake, treat as appropriate  - Monitor I&O, WT and lab values  - Obtain nutritional consult as needed  - Evaluate psychosocial factors contributing to over-consumption  Outcome: Progressing     Problem: POSTPARTUM  Goal: Long Term Goal:Experiences normal postpartum course  Description: INTERVENTIONS:  - Assess and monitor vital signs and lab values.  - Assess fundus and lochia.  - Provide ice/sitz baths for perineum discomfort.  - Monitor healing of incision/episiotomy/laceration, and assess for signs and symptoms of infection and hematoma.  - Assess bladder function and monitor for bladder distention.  - Provide/instruct/assist with pericare as needed.  - Provide VTE prophylaxis as needed.  - Monitor bowel function.  - Encourage ambulation and provide assistance as needed.  - Assess and monitor emotional status and provide social  service/psych resources as needed.  - Utilize standard precautions and use personal protective equipment as indicated. Ensure aseptic care of all intravenous lines and invasive tubes/drains.  - Obtain immunization and exposure to communicable diseases history.  Outcome: Progressing  Goal: Optimize infant feeding at the breast  Description: INTERVENTIONS:  - Initiate breast feeding within first hour after birth.   - Monitor effectiveness of current breast feeding efforts.  - Assess support systems available to mother/family.  - Identify cultural beliefs/practices regarding lactation, letdown techniques, maternal food preferences.  - Assess mother's knowledge and previous experience with breast feeding.  - Provide information as needed about early infant feeding cues (e.g., rooting, lip smacking, sucking fingers/hand) versus late cue of crying.  - Discuss/demonstrate breast feeding aids (e.g., infant sling, nursing footstool/pillows, and breast pumps).  - Encourage mother/other family members to express feelings/concerns, and actively listen.  - Educate father/SO about benefits of breast feeding and how to manage common lactation challenges.  - Recommend avoidance of specific medications or substances incompatible with breast feeding.  - Assess and monitor for signs of nipple pain/trauma.  - Instruct and provide assistance with proper latch.  - Review techniques for milk expression (breast pumping) and storage of breast milk. Provide pumping equipment/supplies, instructions and assistance, as needed.  - Encourage rooming-in and breast feeding on demand.  - Encourage skin-to-skin contact.  - Provide LC support as needed.  - Assess for and manage engorgement.  - Provide breast feeding education handouts and information on community breast feeding support.   Outcome: Progressing  Goal: Establishment of adequate milk supply with medication/procedure interruptions  Description: INTERVENTIONS:  - Review techniques for milk  expression (breast pumping).   - Provide pumping equipment/supplies, instructions, and assistance until it is safe to breastfeed infant.  Outcome: Progressing  Goal: Experiences normal breast weaning course  Description: INTERVENTIONS:  - Assess for and manage engorgement.  - Instruct on breast care.  - Provide comfort measures.  Outcome: Progressing  Goal: Appropriate maternal -  bonding  Description: INTERVENTIONS:  - Assess caregiver- interactions.  - Assess caregiver's emotional status and coping mechanisms.  - Encourage caregiver to participate in  daily care.  - Assess support systems available to mother/family.  - Provide /case management support as needed.  Outcome: Progressing

## 2024-04-05 NOTE — ANESTHESIA POST-OP FOLLOW-UP NOTE
Pt s/p c section with/epidural a with duramorph. POD 1. PT doing well; no complains.  Pain well controlled.   Pt ambulating  Consult complete  
0

## 2024-04-06 NOTE — LACTATION NOTE
04/06/24 1330   Evaluation Type   Evaluation Type Inpatient   Problems identified   Problems identified Knowledge deficit;Multiple birth   Milk supply not WNL Reduced (potential)   Problems Identified Other 34 week twins in scn   Maternal history   Maternal history Obesity;Caesarean section;Induction of labor   Other/comment FOB HIV positive, sickle cell trait   Breastfeeding goal   Breastfeeding goal To maintain breast milk feeding per patient goal   Maternal Assessment   Prior breastfeeding experience (comment below) Multip;Successful   Prior BF experience: comment BF infants 2-3 months each then switched to formula., states had low supply   Breastfeeding Assistance Pumping assistance provided with permission   Pain assessment   Location/Comment denies   Guidelines for use of:   Breast pump type Ameda Platinum;Hand Pump   Suggested use of pump Pump 8-12X/24hr   Reported pumping volumes (ml) 30ml   Other (comment) Mom states feeding is going well.  Encouraged to call to view a latch.

## 2024-04-06 NOTE — PROGRESS NOTES
[facility]  Obstetrics and Gynecology    OB/GYN: Postpartum Progress Note     SUBJECTIVE:  Patient is a 34 year old  female who is s/p  & PLTCS. She is POD# 2.   Doing well. Noted no complaints. Denies fever, chills, N, V, chest pain and SOB. Bleeding has been stable.  Voiding without difficulty.  Passing flatus.  No Bm. Tolerating general diet. Ambulating without difficulty.     OBJECTIVE:  Vitals:    24 2200 24 0200 24 0600 24 0852   BP: 117/76 (!) 140/92 141/80 139/85   Pulse: 67 61 60 58   Resp: 16   16   Temp: 97.7 °F (36.5 °C)  98.2 °F (36.8 °C) 98.2 °F (36.8 °C)   TempSrc: Oral  Oral Oral   SpO2:       Weight:       Height:           Physical Exam:  Lungs:   Respirations non labored   Cardiovascular:   Peripheral pulses +2 bilaterally      General:    AAA. NAD.    Abdomen Soft, nontender, nondistended   Lochia:  appropriate   Uterine Fundus:   firm at the umbilicus   Incision:  healing well, no significant drainage, no dehiscence, no significant erythema with Tegaderm in place    DVT Evaluation:  No evidence of DVT seen on physical exam.  Negative Nani's sign.  No cords or calf tenderness.  No significant calf/ankle edema.     Urinary output is adequate. (When recorded)    Labs:  Recent Labs   Lab 24  0608   RBC 2.91*   HGB 8.4*   HCT 24.0*   MCV 82.5   MCH 28.9   MCHC 35.0   RDW 13.8   NEPRELIM 15.21*   WBC 17.5*   .0       ASSESSMENT/PLAN:  Patient is a 34 year old  female who is s/p  and PLTCS POD# 2.     Doing well   Continue routine postpartum care  Vitals per routine   Encourage ambulation and IS use   Plan for discharge to home on POD#3.   Follow up in 2&6 weeks        Dr. Ishmael Winslow MD    [facility]     This note was created by [employer] voice recognition. Errors in content may be related to improper recognition by the system; efforts to review and correct have been done but errors may still exist. Please be advised the  primary purpose of this note is for me to communicate medical care. Standard sentence structure is not always used. Medical terminology and medical abbreviations may be used. There may be grammatical, typographical, and automated fill ins that may have errors missed in proofreading.

## 2024-04-07 VITALS
DIASTOLIC BLOOD PRESSURE: 103 MMHG | RESPIRATION RATE: 16 BRPM | HEIGHT: 65 IN | HEART RATE: 86 BPM | SYSTOLIC BLOOD PRESSURE: 140 MMHG | BODY MASS INDEX: 45.82 KG/M2 | TEMPERATURE: 98 F | OXYGEN SATURATION: 99 % | WEIGHT: 275 LBS

## 2024-04-07 RX ORDER — DOCUSATE SODIUM 100 MG/1
100 CAPSULE, LIQUID FILLED ORAL 2 TIMES DAILY
Qty: 60 CAPSULE | Refills: 0 | Status: SHIPPED | OUTPATIENT
Start: 2024-04-07 | End: 2024-05-07

## 2024-04-07 RX ORDER — IBUPROFEN 600 MG/1
600 TABLET ORAL EVERY 6 HOURS PRN
Qty: 60 TABLET | Refills: 0 | Status: SHIPPED | OUTPATIENT
Start: 2024-04-07

## 2024-04-07 RX ORDER — POLYETHYLENE GLYCOL 3350 17 G/17G
17 POWDER, FOR SOLUTION ORAL 2 TIMES DAILY PRN
Qty: 60 EACH | Refills: 0 | Status: SHIPPED | OUTPATIENT
Start: 2024-04-07 | End: 2024-05-07

## 2024-04-07 RX ORDER — GABAPENTIN 300 MG/1
300 CAPSULE ORAL 3 TIMES DAILY
Qty: 21 CAPSULE | Refills: 0 | Status: SHIPPED | OUTPATIENT
Start: 2024-04-07 | End: 2024-04-14

## 2024-04-07 RX ORDER — ACETAMINOPHEN 325 MG/1
325 TABLET ORAL EVERY 6 HOURS PRN
Qty: 60 TABLET | Refills: 0 | Status: SHIPPED | OUTPATIENT
Start: 2024-04-07

## 2024-04-07 NOTE — LACTATION NOTE
04/07/24 1100   Evaluation Type   Evaluation Type Inpatient   Problems identified   Problems identified Knowledge deficit;Multiple birth   Problems Identified Other 34 week twins in scn   Maternal history   Maternal history Obesity;Caesarean section;Induction of labor   Other/comment FOB HIV positive, sickle cell trait   Breastfeeding goal   Breastfeeding goal To maintain breast milk feeding per patient goal   Maternal Assessment   Prior breastfeeding experience (comment below) Multip;Successful   Prior BF experience: comment BF infants 2-3 months each then switched to formula., states had low supply   Breastfeeding Assistance Pumping assistance provided with permission   Pain assessment   Location/Comment denies   Guidelines for use of:   Breast pump type Ameda Platinum;Hand Pump;Other   Current use of pump: pumping every 6 hours, discussed pumping more often   Suggested use of pump Pump 8-12X/24hr   Reported pumping volumes (ml) 60ml   Other (comment) Mom may go home today, says that she has been pumping every 6 hours, discussed appropriate timing of pumping and encouraged to pump minimally every three hours, her last pumping she got 60ml, twin 34 5/7 week girls, handout for [facility] center given, encouraged to make an appointment when twins are home and also to call to view a latch while they are in the [facility].

## 2024-04-07 NOTE — DISCHARGE SUMMARY
[facility]  part of [facility]    Discharge Summary    [place] Patient Status:  Inpatient    3/13/1990 MRN [id number]   Location [facility] 3SE Attending Jessie Kurtz, DO   Hosp Day # 5 PCP Peace Alexander MD     Date of Admission: 2024    Date of Discharge: 24     Admission Diagnoses: Pregnancy  Abnormal ultrasonic finding on  screening of mother at 34w5d     Secondary Diagnosis: Monochorionic diamniotic twin gestation, abnormal  screening, sickle cell trait.     Primary OB Clinician: [facility]    [facility] Course:     EDC: Estimated Date of Delivery: 24    Gestational Age: 34w5d    Date of Delivery: 24    Antepartum complications: Monochorionic diamniotic twin gestation, abnormal umbilical artery Dopplers, fetal intolerance to labor, fetal malpresentation of twin B.    Delivered By: Dr. Alisha Pichardo    Delivery Type:  twin A, and primary  section for twin B.      Tubal Ligation: n/a    Twin A  Baby: Liveborn female,     Apgars:  1 minute:   8                 5 minutes: 9  Twin B  Baby: Liveborn female,     Apgars:  1 minute:   7                 5 minutes: 9  Anesthesia: epidural      Surgical Procedures       Case IDs Date Procedure Surgeon Location Status    [id number] 24  SECTION Alisha Dunham MD [facility] L[facility] OR Comp    [id number] 24  SECTION Alisha Dunham MD [facility] L+[facility] OR Can            Intrapartum Complications:  of twin A, malpresentation of twin B.    Laceration: none    Episiotomy: none    Placenta: manual removal    Feeding Method: both breast and bottle fed     Rh Immune Globulin Given: no    Rubella Vaccine Given: no        Discharge Plan:   Discharge Condition: Good  Early Discharge:  NO    Discharge medications:  Current Discharge Medication List        New Orders    Details   ibuprofen 600 MG Oral Tab Take 1 tablet (600 mg total) by mouth every 6 (six) hours as needed  for Pain.      acetaminophen 325 MG Oral Tab Take 1 tablet (325 mg total) by mouth every 6 (six) hours as needed for Pain.      docusate sodium 100 MG Oral Cap Take 1 capsule (100 mg total) by mouth 2 (two) times daily.      polyethylene glycol, PEG 3350, 17 g Oral Powd Pack Take 17 g by mouth 2 (two) times daily as needed.      gabapentin 300 MG Oral Cap Take 1 capsule (300 mg total) by mouth in the morning, at noon, and at bedtime for 7 days.           Home Meds - Unchanged    Details   prenatal vitamin with DHA 27-0.8-228 MG Oral Cap Take 1 capsule by mouth daily.                   Discharge Diet: As tolerated and General diet    Discharge Activity: Pelvic rest until cleared and As tolerated    Follow up:      Follow-up Information       Alisha Dunham MD Follow up in 2 week(s).    Specialty: OBSTETRICS & GYNECOLOGY  Contact information:  [address] [facility]  [address] [place] [id number] [phone]               [facility]. Call.    Specialty: Pediatrics  Why: As needed  Contact information:  [address] [facility] [address] [phone]  Additional information:  Masks are optional for all patients and visitors, unless otherwise indicated.                           Follow up Labs: None        Other Discharge Instructions:         Call if fever greater than 101, worsening pain, nausea or vomiting, heavy vaginal bleeding.    No sex tampons or douching for 6 weeks    No lifting greater than 20 lbs    It is ok to shower, but do not soak your wound.    You may drive once you are no longer taking narcotics ([place]).    Please call if you have persistent headaches, worsening swelling, pain under your right ribs, or blurry vision.    Please let us know if you are having any signs or symptoms of post partum depression - persistent sadness, feelings of hopelessness or worthlessness, sleeping or eating too much or too little, feeling like you would like to hurt  yourself.   Other Discharge Instructions:           Post  Section Home Care Instructions   Pelvic rest for 6 weeks. No sex, tampons, baths or swimming. May shower.  No heavy lifting, nothing greater then 10-15 pounds.  No strenuous activity.  No driving for at least 2 weeks and you are no longer taking narcotics (norco).  Do not soak your incision/wound. Look at incision at least 2 times a day. If any signs/symptoms of infection, let OB office know (redness, swelling, pain, foul smelling odor or discharge).  Call MD for any questions or concerns, temp over 100.4, increased pain, increased bleeding, foul smelling odor/discharge from vagina/incision or signs of postpartum depression.      We hope you were pleased with your care at [facility].  We wish you the best outcome and overall experience with the delivery of your baby.  These instructions will help to minimize pain, limit the risk for an infection, and improve the likelihood of a successful recovery.    What to Expect:  Abdominal cramping after delivery especially if you are breastfeeding.   Vaginal bleeding for about 4-6 weeks that may be followed by a yellow or white discharge for a few more weeks.  Your period will resume in approximately 6-8 weeks, unless you are breastfeeding.    If you are bottle feeding, you may notice breast engorgement in about 3 days.  Your breast may be sore and hard. Please wear a tight fitted bra or sports bra for 24-36 hours to help prevent your breast from producing milk, and use ice packs to relive any discomfort.  If you are breastfeeding, nipple dryness is very common the first few days.    Constipation is common after having a baby.  Please increase fluid and fiber in your diet.      Over-The-Counter Medication  Non-prescription anti-inflammatory medications can also help to ease the pain.  You may take Aleve, Tylenol or Ibuprofen   Colace or Metamucil for Constipation  Lanolin for dry nipples  Tucks,  Witch Hazel and Epifoam for vaginal/perineum discomfort.   Drink a full glass of water with oral medication and take as directed.    Wound Care  The following instructions will promote proper healing and help to prevent infection  Please use soap and water over incision   Pat your incision dry and leave open to air if possible   If you have steri - strips, then please remove after 4-5 days from your surgery. You may remove after a shower to decrease discomfort.   Do not replace the Steri-Strips, if they come off.  If the tapes come off, leave them off and keep the incision clean.  You do not need to cover the incision or put any medications on the incision.    Bathing/Showers  You may resume showers  No baths, swimming, hot tubs until your post-partum visit    Home Medication  Resume your home medications as instructed    Diet  Resume your normal diet    Activity  Refrain from vaginal intercourse, vaginal suppositories, tampon use or douches until after your post-partum visit  No exercising for 4 weeks  You may climb stairs minimally for the 1st week.    Do not do heavy housework for at least 2-3 weeks    Return to Work or School  You may return to work in 6-8 weeks  Contact your obstetrician’s office, if you need a medical release. ([phone])    Driving  Avoid driving for 1-2 weeks or sooner if not taking narcotics.    Follow-up Appointment with Your Obstetrician  Call your obstetrician’s office today for an appointment in four weeks.    The number is [phone].  Verify your appointment date, day, time, and location.  At your 1st post-partum office visit:  Your progress will be evaluated, findings reviewed, and any additional concerns and instructions will be discussed.    Questions or Concerns  Call your obstetrician’s office if you experience the following:  Severe pain not controlled by pain medication  Foul smelling vaginal discharge  Heavy bleeding  Shortness of breath  Fever  Redness, increased  swelling or drainage from your incision  Crying and periods of sadness that prevents you from caring for yourself and your baby  Burning sensation during urination or inability to urinate  Swelling, redness or abnormal warmth to your leg/calf  Please call [phone]. If your call is made after office hours, a physician will be available to help you.  There is always a provider covering our patients.    Thank you for coming to [facility] to start your new family.  The nurses, obstetricians, and the anesthesiologists try very hard to make sure you receive the best care possible.  Your trust in them as well as us is greatly appreciated.    Thanks so much,   The Prov[facility]stetric[facility]necology           Dr. Ishmael Winslow MD    [facility]     This note was created by [employer] voice recognition. Errors in content may be related to improper recognition by the system; efforts to review and correct have been done but errors may still exist. Please be advised the primary purpose of this note is for me to communicate medical care. Standard sentence structure is not always used. Medical terminology and medical abbreviations may be used. There may be grammatical, typographical, and automated fill ins that may have errors missed in proofreading.

## 2024-04-07 NOTE — PROGRESS NOTES
[facility]  Obstetrics and Gynecology    OB/GYN: Postpartum Progress Note     SUBJECTIVE:  Patient is a 34 year old  female who is s/p  & PLTCS. She is POD# 3.   Doing well. Noted no complaints. Denies fever, chills, N, V, chest pain and SOB. Bleeding has been stable.  Voiding without difficulty.  Passing flatus.  No Bm. Tolerating general diet. Ambulating without difficulty.     OBJECTIVE:  Vitals:    24 2000 24 0031 24 0421 24 0851   BP: 142/76 141/90 132/87 (!) 140/103   Pulse: 86 67 66 86   Resp: 16   16   Temp: 97.9 °F (36.6 °C)   98.4 °F (36.9 °C)   TempSrc: Oral   Oral   SpO2:       Weight:       Height:           Physical Exam:  Lungs:   Respirations non labored   Cardiovascular:   Peripheral pulses +2 bilaterally      General:    AAA. NAD.    Abdomen Soft, nontender, nondistended   Lochia:  appropriate   Uterine Fundus:   firm at the umbilicus   Incision:  healing well, no significant drainage, no dehiscence, no significant erythema with Tegaderm in place    DVT Evaluation:  No evidence of DVT seen on physical exam.  Negative Nani's sign.  No cords or calf tenderness.  No significant calf/ankle edema.     Urinary output is adequate. (When recorded)    Labs:  Recent Labs   Lab 24  0608   RBC 2.91*   HGB 8.4*   HCT 24.0*   MCV 82.5   MCH 28.9   MCHC 35.0   RDW 13.8   NEPRELIM 15.21*   WBC 17.5*   .0       ASSESSMENT/PLAN:  Patient is a 34 year old  female who is s/p  and PLTCS POD# 3.     Doing well   Continue routine postpartum care  Vitals per routine   Encourage ambulation and IS use   Plan for discharge to home today.   Follow up in 2&6 weeks        Dr. Ishmael Winslow MD    [facility]     This note was created by [email or web address] voice recognition. Errors in content may be related to improper recognition by the system; efforts to review and correct have been done but errors may still exist. Please be advised the primary purpose of  this note is for me to communicate medical care. Standard sentence structure is not always used. Medical terminology and medical abbreviations may be used. There may be grammatical, typographical, and automated fill ins that may have errors missed in proofreading.

## 2024-04-11 NOTE — PAYOR COMM NOTE
--------------  DISCHARGE REVIEW    Payor: BONNIE MARQUEZ POS/MCNP  Subscriber #:  [id number]  Authorization Number: [id number]    Admit date: 24  Admit time:   3:10 PM  Discharge Date: 2024  4:38 PM     Admitting Physician: Jessie Kurtz DO  Attending Physician:  No att. providers found  Primary Care Physician: Peace Alexander MD          Discharge Summary Notes        Discharge Summary signed by Ishmael Winslow MD at 2024 10:30 AM       Author: Ishmael Winslow MD Specialty: OBSTETRICS & GYNECOLOGY Author Type: Physician    Filed: 2024 10:30 AM Date of Service: 2024 10:26 AM Status: Signed    : Ishmael Winslow MD (Physician)           [facility]  part of [facility]    Discharge Summary    Ansley [place] Patient Status:  Inpatient    3/13/1990 MRN [id number]   Location [facility] 3SE Attending Jessie Kurtz DO   Hosp Day # 5 PCP Peace Alexander MD     Date of Admission: 2024    Date of Discharge: 24     Admission Diagnoses: Pregnancy  Abnormal ultrasonic finding on  screening of mother at 34w5d     Secondary Diagnosis: Monochorionic diamniotic twin gestation, abnormal  screening, sickle cell trait.     Primary OB Clinician: [facility] 10    [facility] Course:     EDC: Estimated Date of Delivery: 24    Gestational Age: 34w5d    Date of Delivery: 24    Antepartum complications: Monochorionic diamniotic twin gestation, abnormal umbilical artery Dopplers, fetal intolerance to labor, fetal malpresentation of twin B.    Delivered By: Dr. Alisha Pichardo    Delivery Type:  twin A, and primary  section for twin B.      Tubal Ligation: n/a    Twin A  Baby: Liveborn female,     Apgars:  1 minute:   8                 5 minutes: 9  Twin B  Baby: Liveborn female,     Apgars:  1 minute:   7                 5 minutes: 9  Anesthesia: epidural      Surgical Procedures       Case IDs Date Procedure Surgeon Location Status    [id number] 24   SECTION Alisha Dunham MD [facility] L[facility] OR [facility]    [id number] 24  SECTION Alisah Dunham MD [facility] L+D OR [facility]            Intrapartum Complications:  of twin A, malpresentation of twin B.    Laceration: none    Episiotomy: none    Placenta: manual removal    Feeding Method: both breast and bottle fed     Rh Immune Globulin Given: no    Rubella Vaccine Given: no        Discharge Plan:   Discharge Condition: Good  Early Discharge:  NO    Discharge medications:  Current Discharge Medication List        New Orders    Details   ibuprofen 600 MG Oral Tab Take 1 tablet (600 mg total) by mouth every 6 (six) hours as needed for Pain.      acetaminophen 325 MG Oral Tab Take 1 tablet (325 mg total) by mouth every 6 (six) hours as needed for Pain.      docusate sodium 100 MG Oral Cap Take 1 capsule (100 mg total) by mouth 2 (two) times daily.      polyethylene glycol, PEG 3350, 17 g Oral Powd Pack Take 17 g by mouth 2 (two) times daily as needed.      gabapentin 300 MG Oral Cap Take 1 capsule (300 mg total) by mouth in the morning, at noon, and at bedtime for 7 days.           Home Meds - Unchanged    Details   prenatal vitamin with DHA 27-0.8-228 MG Oral Cap Take 1 capsule by mouth daily.                   Discharge Diet: As tolerated and General diet    Discharge Activity: Pelvic rest until cleared and As tolerated    Follow up:      Follow-up Information       Alisha Dunham MD Follow up in 2 week(s).    Specialty: OBSTETRICS & GYNECOLOGY  Contact information:  [address] [facility]  [address] [place] [id number] [phone]               [facility]. Call.    Specialty: Pediatrics  Why: As needed  Contact information:  [address]  [facility] [address] [phone]  Additional information:  Masks are optional for all patients and visitors, unless otherwise indicated.                           Follow up Labs: None        Other Discharge  Instructions:         Call if fever greater than 101, worsening pain, nausea or vomiting, heavy vaginal bleeding.    No sex tampons or douching for 6 weeks    No lifting greater than 20 lbs    It is ok to shower, but do not soak your wound.    You may drive once you are no longer taking narcotics ([place]).    Please call if you have persistent headaches, worsening swelling, pain under your right ribs, or blurry vision.    Please let us know if you are having any signs or symptoms of post partum depression - persistent sadness, feelings of hopelessness or worthlessness, sleeping or eating too much or too little, feeling like you would like to hurt yourself.   Other Discharge Instructions:           Post  Section Home Care Instructions   Pelvic rest for 6 weeks. No sex, tampons, baths or swimming. May shower.  No heavy lifting, nothing greater then 10-15 pounds.  No strenuous activity.  No driving for at least 2 weeks and you are no longer taking narcotics (norco).  Do not soak your incision/wound. Look at incision at least 2 times a day. If any signs/symptoms of infection, let OB office know (redness, swelling, pain, foul smelling odor or discharge).  Call MD for any questions or concerns, temp over 100.4, increased pain, increased bleeding, foul smelling odor/discharge from vagina/incision or signs of postpartum depression.      We hope you were pleased with your care at [facility].  We wish you the best outcome and overall experience with the delivery of your baby.  These instructions will help to minimize pain, limit the risk for an infection, and improve the likelihood of a successful recovery.    What to Expect:  Abdominal cramping after delivery especially if you are breastfeeding.   Vaginal bleeding for about 4-6 weeks that may be followed by a yellow or white discharge for a few more weeks.  Your period will resume in approximately 6-8 weeks, unless you are breastfeeding.    If you are  bottle feeding, you may notice breast engorgement in about 3 days.  Your breast may be sore and hard. Please wear a tight fitted bra or sports bra for 24-36 hours to help prevent your breast from producing milk, and use ice packs to relive any discomfort.  If you are breastfeeding, nipple dryness is very common the first few days.    Constipation is common after having a baby.  Please increase fluid and fiber in your diet.      Over-The-Counter Medication  Non-prescription anti-inflammatory medications can also help to ease the pain.  You may take Aleve, Tylenol or Ibuprofen   Colace or Metamucil for Constipation  Lanolin for dry nipples  Tucks, Witch Hazel and Epifoam for vaginal/perineum discomfort.   Drink a full glass of water with oral medication and take as directed.    Wound Care  The following instructions will promote proper healing and help to prevent infection  Please use soap and water over incision   Pat your incision dry and leave open to air if possible   If you have steri - strips, then please remove after 4-5 days from your surgery. You may remove after a shower to decrease discomfort.   Do not replace the Steri-Strips, if they come off.  If the tapes come off, leave them off and keep the incision clean.  You do not need to cover the incision or put any medications on the incision.    Bathing/Showers  You may resume showers  No baths, swimming, hot tubs until your post-partum visit    Home Medication  Resume your home medications as instructed    Diet  Resume your normal diet    Activity  Refrain from vaginal intercourse, vaginal suppositories, tampon use or douches until after your post-partum visit  No exercising for 4 weeks  You may climb stairs minimally for the 1st week.    Do not do heavy housework for at least 2-3 weeks    Return to Work or School  You may return to work in 6-8 weeks  Contact your obstetrician’s office, if you need a medical release. ([phone])    Driving  Avoid driving  for 1-2 weeks or sooner if not taking narcotics.    Follow-up Appointment with Your Obstetrician  Call your obstetrician’s office today for an appointment in four weeks.    The number is [phone].  Verify your appointment date, day, time, and location.  At your 1st post-partum office visit:  Your progress will be evaluated, findings reviewed, and any additional concerns and instructions will be discussed.    Questions or Concerns  Call your obstetrician’s office if you experience the following:  Severe pain not controlled by pain medication  Foul smelling vaginal discharge  Heavy bleeding  Shortness of breath  Fever  Redness, increased swelling or drainage from your incision  Crying and periods of sadness that prevents you from caring for yourself and your baby  Burning sensation during urination or inability to urinate  Swelling, redness or abnormal warmth to your leg/calf  Please call [phone]. If your call is made after office hours, a physician will be available to help you.  There is always a provider covering our patients.    Thank you for coming to [facility] to start your new family.  The nurses, obstetricians, and the anesthesiologists try very hard to make sure you receive the best care possible.  Your trust in them as well as us is greatly appreciated.    Thanks so much,   The Prov[facility] of [facility] Obstetrics and Gynecology           Dr. Ishmael Winslow MD    [facility] 10 OBGYN     This note was created by femeninas voice recognition. Errors in content may be related to improper recognition by the system; efforts to review and correct have been done but errors may still exist. Please be advised the primary purpose of this note is for me to communicate medical care. Standard sentence structure is not always used. Medical terminology and medical abbreviations may be used. There may be grammatical, typographical, and automated fill ins that may have errors missed in proofreading.        Electronically signed by Ishmael Winslow MD on 4/7/2024 10:30 AM         REVIEWER COMMENTS

## 2024-04-11 NOTE — PAYOR COMM NOTE
ASKING FOR RECONSIDERATION OF INPT   THRU   ADMISSION REVIEW     Payor: BONNIE ESPINOZA/ALICIA  Subscriber #:  [id number]  Authorization Number: [id number]    Admit date: 24  Admit time:  3:10 PM       REVIEW DOCUMENTATION:  ED Provider Notes    No notes of this type exist for this encounter.          Date/Time Temp Pulse Resp BP SpO2 Weight O2 Device O2 Flow Rate (L/min) [facility]    24 0851 98.4 °F (36.9 °C) 86 16 140/103 -- -- None (Room air) -- DL    24 0421 -- 66 -- 132/87 -- -- -- -- MG    24 0031 -- 67 -- 141/90 -- -- -- -- MG    24 2000 97.9 °F (36.6 °C) 86 16 142/76 -- -- -- -- MG    24 1542 -- 68 -- 140/98 -- -- -- -- DL    24 1314 -- 94 -- 150/76 -- -- -- -- DL    24 0852 98.2 °F (36.8 °C) 58 16 139/85 -- -- None (Room air) -- DL    24 0600 -- 60 -- 141/80 -- -- -- -- MG    24 0600 98.2 °F (36.8 °C) -- -- -- -- -- -- -- DL    24 0200 -- 61 -- 140/92 -- -- -- -- MG             H&P OB/GYN H&P  CC: Patient is here for betamethasone then IOL     SUBJECTIVE:     Ansley Colorado is a 34 year old  female at 34w3d Estimated Date of Delivery: 24 who is being admitted for  betamethasone then planned IOL for abnormal dopplers of both twins . Her current obstetrical history is significant for mono-di twins. Was having US with [facility] today to f/u growth and dopplers. Both babies showed elevated dopplers, with [facility] recommendation for betamethasone then delivery.     Patient reports tyrone de la rosa contractions, no bleeding, no LOF. Regular FM x 2.     negative Nausea, Vomiting, headache, vision changes and RUQ/Epigastric pain.         JACKIE Confirmation  LMP: Patient's last menstrual period was 2023 (exact date).  JACKIE: 2024, Date entered prior to episode creation      OB Ultrasounds:   1) OB US with MFM today:  Twin A - IUP in cephalic left presentation.    Placenta is posterior, high.   Cardiac activity is present, 158 bpm  EFW 2233 g ( 4  lb 15 oz); 31%.   MVP is 5.1 cm. BPP is 8/8.  UA Doppler 2.44.  MCA Doppler PSV 1.72      Twin B - IUP in cephalic right presentation.   Placenta is posterior, high. Insertion site: velamentous insertion   Cardiac activity is present, 167 bpm  EFW 2244 g (4 lb 15 oz); 31%.   MVP is 6.2 cm. BPP is 8/8.  UA Doppler 2.42 .  MCA Doppler PSV 1.68      Discordance - 0.5 %         Obstetric History:                    OB History    Para Term  AB Living   5 3 3 0 1 3   SAB IAB Ectopic Multiple Live Births      0 0 0 0 3          # Outcome Date GA Lbr Landon/2nd Weight Sex Delivery Anes PTL Lv   5 Current                     4 Term 20 39w5d 04:38 / 00:06 7 lb 8.1 oz (3.405 kg) F NORMAL SPONT None N MAX      Complications: Meconium   3 Term 17 40w3d 09:30 / 00:21 6 lb 10 oz (3.005 kg) F NORMAL SPONT None N MAX      Birth Comments: Mother's age: 25  Maternal Blood Type: O Positive  : 3  Para: 2  Hearing Test: Pass  Bili T: .0.8 at 41 hrs      Complications: Group B beta strep +, Meconium   2 AB / 8w0d                 1 Term 12 40w0d   6 lb 7 oz (2.92 kg) F NORMAL SPONT None N MAX        ASSESSMENT/ PLAN:     Ansley Colorado is a 34 year old  female at 34w3d Estimated Date of Delivery: 24 who is being admitted for  betamethasone then delivery .         Patient Active Problem List   Diagnosis    Prenatal exposure to HIV affecting pregnancy management    Sickle cell trait ([facility])    Morbid obesity with BMI of 40.0-44.9, adult ([facility])    Monochorionic diamniotic twin pregnancy, antepartum ([facility])    Request for sterilization         1. Abnormal fetal dopplers x 2:   - admit with routine labs  - betamethasone now and again in24 hours  - continuous fetal monitoring - reactive x 2 currently  - plan for IOL after betamethasone complete  2. Fetal monitoring: CEFM  3. GBS: collected, if not resulted by the time IOL starts, GBS prophylaxis  4. Pain: PRN  5. Elevated BP   - in MFM  office, with  borderline BP on L&D - send pre-eclampsia labs       SUBJECTIVE:  Pt is a 34 year old  female at 34w4d who was admitted on 2024 for Mono-di twins with abnormal  testing.   Fetal Movement: +. Vaginal Bleeding: no. Contractions: last night, none now. Leakage of Fluid: no.      OBJECTIVE:  Vital signs in last 24 hours:  Temp:  [98.2 °F (36.8 °C)-98.8 °F (37.1 °C)] 98.2 °F (36.8 °C)  Pulse:  [82-98] 82  Resp:  [16] 16  BP: (111-146)/(64-84) 124/73  Temps:       Temp Readings from Last 3 Encounters:   24 98.2 °F (36.8 °C) (Oral)   24 97.6 °F (36.4 °C) (Temporal)   11/10/23 97 °F (36.1 °C) (Temporal)      Maximum Temperature: Temp (24hrs), Av.5 °F (36.9 °C), Min:98.2 °F (36.8 °C), Max:98.8 °F (37.1 °C)     BPs:      BP Readings from Last 3 Encounters:   24 124/73   24 146/79   24 130/72      Weights:      Wt Readings from Last 3 Encounters:   24 275 lb (124.7 kg)   24 275 lb (124.7 kg)   24 264 lb (119.7 kg)         Intake/Output:  Totals for last 24 hours:      Intake/Output Summary (Last 24 hours) at 4/3/2024 0818  Last data filed at 2024 1627      Gross per 24 hour   Intake --   Output 100 ml   Net -100 ml         Uterine Contraction: Q 5 min.    NST:  Twin A baseline 135/ moderate henry/ no accels/ no decels  Twin B baseline 145/ moderate henry/ no accels/ late and prolonged decel at 0650.      Labs:        Lab Results   Component Value Date     WBC 6.8 2024     HGB 11.4 2024     HCT 33.9 2024     .0 2024     CREATSERUM 0.69 2024     BUN 6 2024      2024     K 4.1 2024      2024     CO2 21.0 2024     GLU 61 2024     CA 9.0 2024     ALB 3.8 2024     ALKPHO 136 2024     BILT 0.4 2024     TP 6.9 2024     AST 28 2024     ALT 25 2024   '     ASSESSMENT/PLAN:  Pt is a 34 year old  female at 34w4d who was  admitted on 2024 for abnormal  testing with mono-di twin gestation.     Hospital Day 1     Active Problems:      Patient Active Problem List   Diagnosis    Prenatal exposure to HIV affecting pregnancy management    Sickle cell trait ([facility])    Morbid obesity with BMI of 40.0-44.9, adult ([facility])    Monochorionic diamniotic twin pregnancy, antepartum ([facility])    Request for sterilization    Abnormal ultrasonic finding on  screening of mother      1) Abnormal fetal dopplers  - Plan for induction of labor when betamethasone complete.   - With recurrent decelerations for Twin B may start induction of labor versus  section.   - Patient is NPO now.   - Continue CEFM.      OB/GYN NOTE    FHT's category 1 x 2  UCx q 3 - 4  SVE /-3 AROM with clear fluid.          BRIEF OP NOTE  re-Operative Diagnosis: Other - Add Comments     Post-Operative Diagnosis: * No post-op diagnosis entered *      Procedure Performed:    SECTION     Surgeon(s) and Role:     * Alisha Dunham MD - Primary     * Rox Slater MD - Assisting Surgeon     Assistant(s):        Surgical Findings: Twin A quickly delivered vaginally. Twin B with hand and shoulder presentation, unable to rotate. Normal tubes/ovaries bilaterally     Specimen: cord gases to path     Estimated Blood Loss: No data recorded      OPERATIVE REPORT        PREOPERATIVE DIAGNOSIS:  Twin gestation.  POSTOPERATIVE DIAGNOSIS:  Malpresentation of twin B.  PROCEDURE:  Primary low transverse  section.        OB NOTE  Pain well controlled. No heavy bleeding     AFEB VSS  FF  Wound cdi     Hgb 8.4      POD#1 stable    /6 OB NOTE  OB/GYN: Postpartum Progress Note      SUBJECTIVE:  Patient is a 34 year old  female who is s/p  & PLTCS. She is POD# 2.   Doing well. Noted no complaints. Denies fever, chills, N, V, chest pain and SOB. Bleeding has been stable.  Voiding without difficulty.  Passing flatus.  No Bm.  Tolerating general diet. Ambulating without difficulty.      OBJECTIVE:         Vitals:     24 2200 24 0200 24 0600 24 0852   BP: 117/76 (!) 140/92 141/80 139/85   Pulse: 67 61 60 58   Resp: 16     16   Temp: 97.7 °F (36.5 °C)   98.2 °F (36.8 °C) 98.2 °F (36.8 °C)   TempSrc: Oral   Oral Oral   SpO2:           Weight:           Height:                 Physical Exam:  Lungs:   Respirations non labored   Cardiovascular:   Peripheral pulses +2 bilaterally       General:    AAA. NAD.    Abdomen Soft, nontender, nondistended   Lochia:  appropriate   Uterine Fundus:   firm at the umbilicus   Incision:  healing well, no significant drainage, no dehiscence, no significant erythema with Tegaderm in place    DVT Evaluation:  No evidence of DVT seen on physical exam.  Negative Nani's sign.  No cords or calf tenderness.  No significant calf/ankle edema.      Urinary output is adequate. (When recorded)     Labs:      Recent Labs   Lab 24  0608   RBC 2.91*   HGB 8.4*   HCT 24.0*   MCV 82.5   MCH 28.9   MCHC 35.0   RDW 13.8   NEPRELIM 15.21*   WBC 17.5*   .0         ASSESSMENT/PLAN:  Patient is a 34 year old  female who is s/p  and PLTCS POD# 2.      Doing well   Continue routine postpartum care  Vitals per routine   Encourage ambulation and IS use   Plan for discharge to home on POD#3.   Follow up in 2&6 weeks

## 2024-04-26 ENCOUNTER — POSTPARTUM (OUTPATIENT)
Dept: OBGYN CLINIC | Facility: CLINIC | Age: 34
End: 2024-04-26
Payer: COMMERCIAL

## 2024-04-26 VITALS
DIASTOLIC BLOOD PRESSURE: 78 MMHG | BODY MASS INDEX: 40.17 KG/M2 | SYSTOLIC BLOOD PRESSURE: 128 MMHG | WEIGHT: 241.13 LBS | HEIGHT: 65 IN

## 2024-04-26 DIAGNOSIS — Z09 POSTOP CHECK: Primary | ICD-10-CM

## 2024-04-26 PROCEDURE — 3078F DIAST BP <80 MM HG: CPT | Performed by: OBSTETRICS & GYNECOLOGY

## 2024-04-26 PROCEDURE — 99024 POSTOP FOLLOW-UP VISIT: CPT | Performed by: OBSTETRICS & GYNECOLOGY

## 2024-04-26 PROCEDURE — 96127 BRIEF EMOTIONAL/BEHAV ASSMT: CPT | Performed by: OBSTETRICS & GYNECOLOGY

## 2024-04-26 PROCEDURE — 3074F SYST BP LT 130 MM HG: CPT | Performed by: OBSTETRICS & GYNECOLOGY

## 2024-04-26 PROCEDURE — 3008F BODY MASS INDEX DOCD: CPT | Performed by: OBSTETRICS & GYNECOLOGY

## 2024-04-26 NOTE — PROGRESS NOTES
CC: Patient is here for 2 W postop check for c/s for malpresentation twin B    HPI: Patient is a 34 year old  for above.       Patient's last menstrual period was 2023 (exact date).    OB History    Para Term  AB Living   5 4 3 1 1 5   SAB IAB Ectopic Multiple Live Births   0 0 0 1 5      # Outcome Date GA Lbr Landon/2nd Weight Sex Type Anes PTL Lv   5A  24 34w5d 15:05 / 00:04 5 lb 5.7 oz (2.43 kg) F NORMAL SPONT EPI, Gen Y MAX   5B  24 34w5d 15:05 / 00:17 4 lb 7.6 oz (2.03 kg) F Caesarean Gen, EPI Y MAX      Complications: Other - see comments   4 Term / 39w5d 04:38 / 00:06 7 lb 8.1 oz (3.405 kg) F NORMAL SPONT None N MAX      Complications: Meconium   3 Term 17 40w3d 09:30 / 00:21 6 lb 10 oz (3.005 kg) F NORMAL SPONT None N MAX      Birth Comments: Mother's age: 25  Maternal Blood Type: O Positive  : 3  Para: 2  Hearing Test: Pass  Bili T: .0.8 at 41 hrs      Complications: Group B beta strep +, Meconium   2 AB / 8w0d          1 Term 12 40w0d  6 lb 7 oz (2.92 kg) F NORMAL SPONT None N MAX       GYN hx:       LPS:    Past Medical History:    Sickle cell trait (HCC)     Past Surgical History:   Procedure Laterality Date      2024    twin B    [place] teeth removed           Allergies   Allergen Reactions    Nickel RASH    Soap RASH     DOVE SOAP and TIDE LAUNDRY DETERGENT      Family History   Problem Relation Age of Onset    Diabetes Father     Diabetes Maternal Grandmother     Heart Disorder Maternal Grandmother     Hypertension Maternal Grandmother     Cancer Maternal Grandfather     Diabetes Maternal Grandfather     Diabetes Paternal Grandmother     Cancer Paternal Grandmother         Ovarian/uterine    Other (congestive heart failure) Paternal Grandmother     Prostate Cancer Paternal Grandfather      Social History     Socioeconomic History    Marital status: Single     Spouse name: Angus Farah    Number of  children: 3    Years of education: 16    Highest education level: Not on file   Occupational History    Occupation:      Comment: Full time    Occupation: RN     Comment: [employer]   Tobacco Use    Smoking status: Never    Smokeless tobacco: Never   Vaping Use    Vaping status: Never Used   Substance and Sexual Activity    Alcohol use: Not Currently     Alcohol/week: 1.0 standard drink of alcohol     Types: 1 Standard drinks or equivalent per week    Drug use: No    Sexual activity: Not on file   Other Topics Concern     Service No    Blood Transfusions No    Caffeine Concern No    Occupational Exposure No    Hobby Hazards No    Sleep Concern No    Stress Concern No    Weight Concern No    Special Diet No    Back Care No    Exercise No    Bike Helmet No    Seat Belt Yes    Self-Exams No   Social History Narrative    Not on file     Social Determinants of Health     Financial Resource Strain: Low Risk  (4/2/2024)    Financial Resource Strain     Difficulty of Paying Living Expenses: Not hard at all     Med Affordability: No   Food Insecurity: No Food Insecurity (4/2/2024)    Food Insecurity     Food Insecurity: Never true   Transportation Needs: No Transportation Needs (4/2/2024)    Transportation Needs     Lack of Transportation: No   Stress: No Stress Concern Present (4/2/2024)    Stress     Feeling of Stress : No   Housing Stability: Low Risk  (4/2/2024)    Housing Stability     Housing Instability: No     Housing Instability Emergency: Not on file       Medications reviewed. See active list.     Ht 65\"   Wt 241 lb 1.6 oz (109.4 kg)   LMP 08/05/2023 (Exact Date)   BMI 40.12 kg/m²       Exam:   GENERAL: well developed, well nourished, in no apparent distress  ABDOMEN: Soft, non distended; non tender, no masses. Wound CDI + small amt of granulation tissue at L side of incision      A/P: Patient is 34 year old female     1. Postop check    Doing well.   Alisha Dunham MD

## 2024-05-30 ENCOUNTER — OFFICE VISIT (OUTPATIENT)
Dept: OBGYN CLINIC | Facility: CLINIC | Age: 34
End: 2024-05-30

## 2024-05-30 VITALS
SYSTOLIC BLOOD PRESSURE: 120 MMHG | HEIGHT: 65 IN | WEIGHT: 246 LBS | BODY MASS INDEX: 40.98 KG/M2 | DIASTOLIC BLOOD PRESSURE: 80 MMHG

## 2024-05-30 DIAGNOSIS — E66.01 MORBID OBESITY WITH BMI OF 40.0-44.9, ADULT (HCC): Chronic | ICD-10-CM

## 2024-05-30 DIAGNOSIS — Z30.09 FAMILY PLANNING: ICD-10-CM

## 2024-05-30 PROCEDURE — 3074F SYST BP LT 130 MM HG: CPT | Performed by: OBSTETRICS & GYNECOLOGY

## 2024-05-30 PROCEDURE — 3079F DIAST BP 80-89 MM HG: CPT | Performed by: OBSTETRICS & GYNECOLOGY

## 2024-05-30 PROCEDURE — 3008F BODY MASS INDEX DOCD: CPT | Performed by: OBSTETRICS & GYNECOLOGY

## 2024-05-30 RX ORDER — ACETAMINOPHEN AND CODEINE PHOSPHATE 120; 12 MG/5ML; MG/5ML
0.35 SOLUTION ORAL DAILY
Qty: 84 TABLET | Refills: 3 | Status: SHIPPED | OUTPATIENT
Start: 2024-05-30 | End: 2025-05-30

## 2024-05-30 NOTE — PROGRESS NOTES
GYN H&P     2024  11:54 AM    CC: Patient is here for 7 W PP FU after c/s for 2nd twin    HPI: Patient is a 34 year old  for above. Babies are doing well.     Breast and bottle feeding.   She is interested in possibly more children in the future and would like to use OCP's. S    Is interested in weight loss and would like to see weight loss doctor     Last Postpartum Depression Scale  I have been able to laugh and see the funny side of things: As much as I always could  I have looked forward with enjoyment to things: As much as I ever did  I have blamed myself unnecessarily when things went wrong: No, never  I have been anxious or worried for no good reason: No, not at all  I have felt scared or panicky for no good reason: No, not at all  Things have been getting on top of me: No, I have been coping as well as ever  I have been so unhappy that I have had difficulty sleeping: Not at all  I have felt sad or miserable: No, not at all  I have been so unhappy that I have been crying: No, never  The thought of harming myself has occurred to me: Never  Total: 0      Patient's last menstrual period was 2023 (exact date).    OB History    Para Term  AB Living   5 4 3 1 1 5   SAB IAB Ectopic Multiple Live Births   0 0 0 1 5      # Outcome Date GA Lbr Landon/2nd Weight Sex Type Anes PTL Lv   5A  24 34w5d 15:05 / 00:04 5 lb 5.7 oz (2.43 kg) F NORMAL SPONT EPI, Gen Y MAX   5B  24 34w5d 15:05 / 00:17 4 lb 7.6 oz (2.03 kg) F Caesarean Gen, EPI Y MAX      Complications: Other - see comments   4 Term / 39w5d 04:38 / 00:06 7 lb 8.1 oz (3.405 kg) F NORMAL SPONT None N MAX      Complications: Meconium   3 Term 17 40w3d 09:30 / 00:21 6 lb 10 oz (3.005 kg) F NORMAL SPONT None N MAX      Birth Comments: Mother's age: 25  Maternal Blood Type: O Positive  : 3  Para: 2  Hearing Test: Pass  Bili T: .0.8 at 41 hrs      Complications: Group B beta strep +, Meconium   2  AB 16 8w0d          1 Term 12 40w0d  6 lb 7 oz (2.92 kg) F NORMAL SPONT None N MAX       GYN hx:    Hx Prior Abnormal Pap: No  Pap Date: 23  Pap Result Notes: Negative      Past Medical History:    Sickle cell trait (HCC)     Past Surgical History:   Procedure Laterality Date      2024    twin B    [place] teeth removed           Allergies   Allergen Reactions    Nickel RASH    Soap RASH     DOVE SOAP and TIDE LAUNDRY DETERGENT      Family History   Problem Relation Age of Onset    Diabetes Father     Diabetes Maternal Grandmother     Heart Disorder Maternal Grandmother     Hypertension Maternal Grandmother     Cancer Maternal Grandfather     Diabetes Maternal Grandfather     Diabetes Paternal Grandmother     Cancer Paternal Grandmother         Ovarian/uterine    Other (congestive heart failure) Paternal Grandmother     Prostate Cancer Paternal Grandfather      Social History     Socioeconomic History    Marital status: Single     Spouse name: Angus Farah    Number of children: 3    Years of education: 16   Occupational History    Occupation:      Comment: Full time    Occupation: RN     Comment: [employer]   Tobacco Use    Smoking status: Never    Smokeless tobacco: Never   Vaping Use    Vaping status: Never Used   Substance and Sexual Activity    Alcohol use: Not Currently     Alcohol/week: 1.0 standard drink of alcohol     Types: 1 Standard drinks or equivalent per week    Drug use: No     Social History     Social History Narrative    Not on file       Medications reviewed. See active list.     /80   Ht 65\"   Wt 246 lb (111.6 kg)   LMP 2023 (Exact Date)   Breastfeeding Yes   BMI 40.94 kg/m²       Exam:   GENERAL: well developed, well nourished, in no apparent distress  SKIN: no rashes, no suspicious lesions  ABDOMEN: Soft, non distended; non tender, no masses.  Liver and spleen non-tender, no enlargement. No palpable hernias, Wound  CDI        A/P: Patient is 34 year old female     1. Postpartum care following  delivery ([facility])    2. Family planning  - Norethindrone (ORTHO MICRONOR) 0.35 MG Oral Tab; Take 1 tablet (0.35 mg total) by mouth daily.  Dispense: 84 tablet; Refill: 3    3. Morbid obesity with BMI of 40.0-44.9, adult ([facility])  - [facility] Weight Management - Dr. Jose Armando Roberts [facility] 9      Alisha Dunham MD

## 2024-10-28 ENCOUNTER — HOSPITAL ENCOUNTER (EMERGENCY)
Facility: HOSPITAL | Age: 34
Discharge: HOME | End: 2024-10-29
Attending: EMERGENCY MEDICINE
Payer: MEDICAID

## 2024-10-28 DIAGNOSIS — R51.9 ACUTE NONINTRACTABLE HEADACHE, UNSPECIFIED HEADACHE TYPE: Primary | ICD-10-CM

## 2024-10-28 PROCEDURE — 99284 EMERGENCY DEPT VISIT MOD MDM: CPT

## 2024-10-28 PROCEDURE — 96375 TX/PRO/DX INJ NEW DRUG ADDON: CPT

## 2024-10-28 PROCEDURE — 96374 THER/PROPH/DIAG INJ IV PUSH: CPT

## 2024-10-28 PROCEDURE — 96361 HYDRATE IV INFUSION ADD-ON: CPT

## 2024-10-28 RX ORDER — METOCLOPRAMIDE HYDROCHLORIDE 5 MG/ML
10 INJECTION INTRAMUSCULAR; INTRAVENOUS ONCE
Status: COMPLETED | OUTPATIENT
Start: 2024-10-28 | End: 2024-10-28

## 2024-10-28 RX ORDER — DIPHENHYDRAMINE HYDROCHLORIDE 50 MG/ML
12.5 INJECTION INTRAMUSCULAR; INTRAVENOUS ONCE
Status: COMPLETED | OUTPATIENT
Start: 2024-10-28 | End: 2024-10-28

## 2024-10-28 RX ORDER — METOCLOPRAMIDE 10 MG/1
10 TABLET ORAL 3 TIMES DAILY PRN
Qty: 10 TABLET | Refills: 0 | Status: SHIPPED | OUTPATIENT
Start: 2024-10-28 | End: 2024-11-27

## 2024-10-28 RX ORDER — KETOROLAC TROMETHAMINE 30 MG/ML
15 INJECTION, SOLUTION INTRAMUSCULAR; INTRAVENOUS ONCE
Status: COMPLETED | OUTPATIENT
Start: 2024-10-28 | End: 2024-10-28

## 2024-10-29 VITALS
DIASTOLIC BLOOD PRESSURE: 86 MMHG | TEMPERATURE: 98 F | RESPIRATION RATE: 18 BRPM | OXYGEN SATURATION: 100 % | HEIGHT: 65 IN | WEIGHT: 260 LBS | HEART RATE: 79 BPM | BODY MASS INDEX: 43.32 KG/M2 | SYSTOLIC BLOOD PRESSURE: 132 MMHG

## 2024-10-29 NOTE — ED QUICK NOTES
Patient presents to ED 68 from triage with c/o headache that has lasted on and off x 3 weeks. Patient states she did have high BP after giving birth 6 months ago. Patient denies taking BP medication. Has had some dizziness and nausea with onset of headaches. Taking OTC medication with little improvement. Patient is A&O x 4. No distress noted. Respirations regular and unlabored. Call light at reach.

## 2024-10-29 NOTE — ED PROVIDER NOTES
Patient Seen in: [facility] Emergency Department      History     Chief Complaint   Patient presents with    Headache     Stated Complaint: HTN    Subjective:   HPI      34-year-old female presents for evaluation of headache.  Patient reports headache for the past 3 weeks.  Today noted high blood pressure when measured at home.  Describes no recent trauma, fever, focal weakness or numbness, generalized headache that radiates into bilateral shoulders, nausea, vomiting.    Objective:     Past Medical History:    Sickle cell trait (HCC)              Past Surgical History:   Procedure Laterality Date      2024    twin B    [place] teeth removed                      Social History     Socioeconomic History    Marital status: Single     Spouse name: Angus Farah    Number of children: 3    Years of education: 16   Occupational History    Occupation:      Comment: Full time    Occupation: RN     Comment: [employer]   Tobacco Use    Smoking status: Never    Smokeless tobacco: Never   Vaping Use    Vaping status: Never Used   Substance and Sexual Activity    Alcohol use: Not Currently     Alcohol/week: 1.0 standard drink of alcohol     Types: 1 Standard drinks or equivalent per week    Drug use: No   Other Topics Concern     Service No    Blood Transfusions No    Caffeine Concern No    Occupational Exposure No    Hobby Hazards No    Sleep Concern No    Stress Concern No    Weight Concern No    Special Diet No    Back Care No    Exercise No    Bike Helmet No    Seat Belt Yes    Self-Exams No     Social Drivers of Health     Financial Resource Strain: Low Risk  (2024)    Financial Resource Strain     Difficulty of Paying Living Expenses: Not hard at all     Med Affordability: No   Food Insecurity: No Food Insecurity (2024)    Food Insecurity     Food Insecurity: Never true   Transportation Needs: No Transportation Needs (2024)    Transportation Needs     Lack  of Transportation: No   Stress: No Stress Concern Present (4/2/2024)    Stress     Feeling of Stress : No   Housing Stability: Low Risk  (4/2/2024)    Housing Stability     Housing Instability: No                  Physical Exam     ED Triage Vitals [10/28/24 1945]   /88   Pulse 85   Resp 18   Temp 98 °F (36.7 °C)   Temp src Oral   SpO2 100 %   O2 Device None (Room air)       Current Vitals:   Vital Signs  BP: 146/88  Pulse: 85  Resp: 18  Temp: 98 °F (36.7 °C)  Temp src: Oral  MAP (mmHg): (!) 107    Oxygen Therapy  SpO2: 100 %  O2 Device: None (Room air)        Physical Exam  Vitals and nursing note reviewed.   Constitutional:       General: She is not in acute distress.     Appearance: She is well-developed.   HENT:      Head: Normocephalic and atraumatic.   Eyes:      Extraocular Movements: Extraocular movements intact.      Conjunctiva/sclera: Conjunctivae normal.      Pupils: Pupils are equal, round, and reactive to light.   Cardiovascular:      Rate and Rhythm: Normal rate and regular rhythm.      Heart sounds: Normal heart sounds.   Pulmonary:      Effort: Pulmonary effort is normal. No respiratory distress.      Breath sounds: Normal breath sounds.   Abdominal:      General: Bowel sounds are normal. There is no distension.      Palpations: Abdomen is soft.      Tenderness: There is no abdominal tenderness. There is no guarding or rebound.   Musculoskeletal:         General: Normal range of motion.      Cervical back: Normal range of motion and neck supple.   Skin:     General: Skin is warm and dry.      Findings: No rash.   Neurological:      General: No focal deficit present.      Mental Status: She is alert and oriented to person, place, and time.      Cranial Nerves: No cranial nerve deficit.      Sensory: No sensory deficit.      Motor: No weakness.             ED Course   Labs Reviewed - No data to display              MDM              Medical Decision Making  Differential diagnosis includes but is  not limited to tension headache, dehydration, migraine, malignancy, subarachnoid hemorrhage    Well-appearing patient with normal neurovascular exam, plan for IV fluids, Reglan and Benadryl, Toradol.  If improved anticipate discharge with supportive care and outpatient follow-up as well as strict return precautions.  Discussed this with the patient who verbalizes understanding of and agreement with this plan.  Signed out to oncoming ER physician.    Risk  Prescription drug management.        Disposition and Plan     Clinical Impression:  1. Acute nonintractable headache, unspecified headache type         Disposition:  There is no disposition on file for this visit.  There is no disposition time on file for this visit.    Follow-up:  Peace Alexander MD  [address] [place] [id number]  [phone]    Schedule an appointment as soon as possible for a visit in 1 week(s)  For follow up          Medications Prescribed:  Current Discharge Medication List        START taking these medications    Details   metoclopramide 10 MG Oral Tab Take 1 tablet (10 mg total) by mouth 3 (three) times daily as needed (nausea or vomiting).  Qty: 10 tablet, Refills: 0                 Supplementary Documentation:

## 2024-10-29 NOTE — ED QUICK NOTES
Rounding Completed    Plan of Care reviewed. Waiting for fluids to complete.  Patient resting with visible, regular and unlabored respirations.  Provided decreased lighting to allow patient to rest.    Bed is locked and in lowest position. Call light within reach.

## 2024-10-29 NOTE — DISCHARGE INSTRUCTIONS
Take Tylenol or ibuprofen at the first sign of headache.    If needed you can take Reglan as prescribed for headache or nausea.    See primary care within 1 week for follow-up.    Return to the ER if you develop worsening symptoms, sudden severe headache, weakness or numbness on one side of the body, slurred speech, or any emergent concerns.    We want you to be able to vote in the upcoming election in a safe and healthy way if you are able to vote. You can go to www.vot-[email or web address] or https://ova.[place]lections.[place].gov/ for more information.  If you have not already, make sure your voter registration is up to date so you can participate in the general elections this November.

## 2024-10-29 NOTE — ED INITIAL ASSESSMENT (HPI)
Pt arrives through triage with         complaints of HA  since the last 3 weeks. Reports having high BP postpartum. Denies taking BP meds. Denies dizziness. Mild nausea

## 2024-11-29 ENCOUNTER — TELEPHONE (OUTPATIENT)
Dept: SURGERY | Facility: CLINIC | Age: 34
End: 2024-11-29

## 2024-12-02 ENCOUNTER — OFFICE VISIT (OUTPATIENT)
Dept: SURGERY | Facility: CLINIC | Age: 34
End: 2024-12-02
Payer: MEDICAID

## 2024-12-02 VITALS
HEIGHT: 65.6 IN | DIASTOLIC BLOOD PRESSURE: 80 MMHG | WEIGHT: 262.5 LBS | OXYGEN SATURATION: 99 % | BODY MASS INDEX: 42.69 KG/M2 | HEART RATE: 101 BPM | SYSTOLIC BLOOD PRESSURE: 130 MMHG

## 2024-12-02 DIAGNOSIS — D50.9 IRON DEFICIENCY ANEMIA, UNSPECIFIED IRON DEFICIENCY ANEMIA TYPE: ICD-10-CM

## 2024-12-02 DIAGNOSIS — E66.01 MORBID OBESITY WITH BMI OF 40.0-44.9, ADULT (HCC): Chronic | ICD-10-CM

## 2024-12-02 DIAGNOSIS — E88.819 INSULIN RESISTANCE: Primary | ICD-10-CM

## 2024-12-02 DIAGNOSIS — E55.9 VITAMIN D DEFICIENCY: ICD-10-CM

## 2024-12-02 DIAGNOSIS — Z51.81 ENCOUNTER FOR THERAPEUTIC DRUG MONITORING: ICD-10-CM

## 2024-12-02 PROBLEM — E88.818: Status: ACTIVE | Noted: 2024-12-02

## 2024-12-02 PROBLEM — L68.0: Status: ACTIVE | Noted: 2024-12-02

## 2024-12-02 PROBLEM — L83: Status: ACTIVE | Noted: 2024-12-02

## 2024-12-02 PROCEDURE — 99205 OFFICE O/P NEW HI 60 MIN: CPT | Performed by: INTERNAL MEDICINE

## 2024-12-02 NOTE — PROGRESS NOTES
[facility] Consultation Note       Patient:  Ansley Colorado  :      3/13/1990  MRN:      [id number]    Referring Provider: Dr. Dunham       Chief Complaint:    Chief Complaint   Patient presents with    Consult    Weight Management       SUBJECTIVE     History of Present Illness:  Ansley Colorado has been referred to me for evaluation and treatment.       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 Weight has been a struggle for the past several years and is now starting to develop into co-morbidities that are worrisome to the patient. Patient is interested in losing weight, so it can stay off long term.    Patient also understands that this is a life style change and wants to get on track.    Interested in non surgical weight loss    Past Medical History:   Past Medical History:    Insulin resistance    Morbid obesity with BMI of 40.0-44.9, adult (HCC)    Sickle cell trait (HCC)       OBJECTIVE     Vitals: /80   Pulse 101   Ht 5' 5.6\" (1.666 m)   Wt 262 lb 8 oz (119.1 kg)   LMP 10/21/2024 (Exact Date)   SpO2 99%   BMI 42.89 kg/m²      Patient Medications:    Current Outpatient Medications   Medication Sig Dispense Refill    ibuprofen 600 MG Oral Tab Take 1 tablet (600 mg total) by mouth every 6 (six) hours as needed for Pain. 60 tablet 0    acetaminophen 325 MG Oral Tab Take 1 tablet (325 mg total) by mouth every 6 (six) hours as needed for Pain. 60 tablet 0       Allergies:  Nickel and Soap     Comorbidities:  insulin resistance    Social History:    Social History     Socioeconomic History    Marital status: Single     Spouse name: Angus Farah    Number of children: 3    Years of education: 16    Highest education level: Not on file   Occupational History    Occupation:      Comment: Full time     Occupation: RN     Comment: [employer]   Tobacco Use    Smoking status: Never    Smokeless tobacco: Never   Vaping Use    Vaping status: Never Used   Substance and Sexual Activity    Alcohol use: Not Currently     Alcohol/week: 1.0 standard drink of alcohol     Types: 1 Standard drinks or equivalent per week    Drug use: No    Sexual activity: Not on file   Other Topics Concern     Service No    Blood Transfusions No    Caffeine Concern No    Occupational Exposure No    Hobby Hazards No    Sleep Concern No    Stress Concern No    Weight Concern No    Special Diet No    Back Care No    Exercise No    Bike Helmet No    Seat Belt Yes    Self-Exams No   Social History Narrative    Not on file     Social Drivers of Health     Financial Resource Strain: Low Risk  (2024)    Financial Resource Strain     Difficulty of Paying Living Expenses: Not hard at all     Med Affordability: No   Food Insecurity: No Food Insecurity (2024)    Food Insecurity     Food Insecurity: Never true   Transportation Needs: No Transportation Needs (2024)    Transportation Needs     Lack of Transportation: No   Stress: No Stress Concern Present (2024)    Stress     Feeling of Stress : No   Housing Stability: Low Risk  (2024)    Housing Stability     Housing Instability: No     Housing Instability Emergency: Not on file     Crib or Bassinette: Not on file     Surgical History:    Past Surgical History:   Procedure Laterality Date      2024    twin B    [place] teeth removed             Family History:    Family History   Problem Relation Age of Onset    Diabetes Father     Diabetes Maternal Grandmother     Heart Disorder Maternal Grandmother     Hypertension Maternal Grandmother     Cancer Maternal Grandfather     Diabetes Maternal Grandfather     Diabetes Paternal Grandmother     Cancer Paternal Grandmother         Ovarian/uterine    Other (congestive heart failure) Paternal Grandmother     Prostate  Cancer Paternal Grandfather            Typical Dietary Intake:  Breakfast AM Snack Lunch PM Snack Dinner   Coffee, muffin   Tacos rice and beans, diet Recees,  FF, chicken, pasta, veggies, bread, soup   Eats quickly  Sweet tooth    Soda Drinker?: Yes  If yes, how much?:  diet    Number of restaurant or fast food meals/week:  3 meals/week    Nutritional Goals Reviewed and Discussed:     Limit carbohydrates to 100 gms per day, Eat 100-200 calories within 1 hour of waking up, and Eat 3-4 cups of fresh fruit or vegetables daily    Behavior Modifications Reviewed and Discussed:    Eat breakfast, Eat 3 meals per day, Plan meals in advance, Read nutrition labels, Drink 64oz of water per day, Maintain a daily food journal, No drinking 30 minutes before or after meals, Utilize portion control strategies to reduce calorie intake, Identify triggers for eating and manage cues, and Eat slowly and take 20 to 30 minutes to complete each meal      ROS:  Constitutional: negative  Respiratory: negative  Cardiovascular: negative  Gastrointestinal: negative  Musculoskeletal:positive for back pain  Neurological: positive for headaches  Behavioral/Psych: positive for stress  Endocrine: negative    Physical Exam:  General appearance: alert, appears stated age, cooperative, and morbidly obese  Head: Normocephalic, without obvious abnormality, atraumatic  Back: symmetric, no curvature. ROM normal. No CVA tenderness.  Lungs: clear to auscultation bilaterally  Heart: S1, S2 normal, no murmur, click, rub or gallop, regular rate and rhythm  Abdomen:  soft, obese, non tender  Extremities: extremities normal, atraumatic, no cyanosis or edema  Pulses: 2+ and symmetric  Skin: Skin color, texture, turgor normal. No rashes or lesions  Neurologic: Grossly normal    ASSESSMENT         Encounter Diagnosis(ses):   1. Insulin resistance    2. Encounter for therapeutic drug monitoring    3. Vitamin D deficiency    4. Morbid obesity with BMI of 40.0-44.9,  adult ([facility])    5. Iron deficiency anemia, unspecified iron deficiency anemia type        PLAN     Patient is not interested in bariatric surgery. Patient desires to pursue traditional weight loss at this time.      Insulin resistance: may improve with diet and exercise    Goals for next month:  1. Keep a food log.  2. Drink 48-64 ounces of non-caloric beverages per day. No fruit juices or regular soda.  3. Increase activity-upper body exercises, walk 10 minutes per day.  4. Increase fruit and vegetable servings to 5-6 per day.      Will check lipids, A1c, iron studies    Compound semaglutide is a custom-made medication that mimics the GLP-1 hormone. It is used to treat type 2 diabetes and lower the risk of heart or blood vessel disease. It works by increasing insulin release, lowering glucagon release, delaying gastric emptying and reducing appetite. Compound semaglutide is prescribed when an FDA-approved medication, dose, or dosage form is unavailable (ie. Nationwide shortage or no obesity coverage for GLP-1 meds).     Cost of these medications is  dollars monthly based on dose.    Will start at 0.25 mg      Diagnoses and all orders for this visit:    Insulin resistance  -     Lipid Panel; Future  -     Hemoglobin A1C; Future  -     Vitamin B12; Future  -     Vitamin D, 25-Hydroxy; Future  -     Iron And Tibc; Future    Encounter for therapeutic drug monitoring  -     Lipid Panel; Future  -     Hemoglobin A1C; Future  -     Vitamin B12; Future  -     Vitamin D, 25-Hydroxy; Future  -     Iron And Tibc; Future    Vitamin D deficiency  -     Lipid Panel; Future  -     Hemoglobin A1C; Future  -     Vitamin B12; Future  -     Vitamin D, 25-Hydroxy; Future  -     Iron And Tibc; Future    Morbid obesity with BMI of 40.0-44.9, adult ([facility])  -     Lipid Panel; Future  -     Hemoglobin A1C; Future  -     Vitamin B12; Future  -     Vitamin D, 25-Hydroxy; Future  -     Iron And Tibc; Future    Iron deficiency anemia,  unspecified iron deficiency anemia type  -     Lipid Panel; Future  -     Hemoglobin A1C; Future  -     Vitamin B12; Future  -     Vitamin D, 25-Hydroxy; Future  -     Iron And Tibc; Future        Jose Armando Roberts MD

## 2024-12-07 NOTE — TELEPHONE ENCOUNTER
ED Provider Note    CHIEF COMPLAINT  Chief Complaint   Patient presents with    Back Pain     Reports known fractures in back T12, L1, L2. States pain is out of control despite taking norco 10/325. Denies new injury. Reports numbness/tingling to LLE. Denies loss of b/b. Arrives in wheel chair.        EXTERNAL RECORDS REVIEWED  Outpatient Notes PMNR visit with [facility] on 12/2 where she was given Norco for pain control.  IR kyphoplasty orders were placed.    HPI/ROS  LIMITATION TO HISTORY   Select: : None  OUTSIDE HISTORIAN(S):  Family daughter who reports she is unsafe at home not performing ADLs    Yamile Go is a 75 y.o. female who presents for intractable pain in the low back.  The patient had a fall and was evaluated here on 11/29.  She is found to have L1 and L2 compression fractures with 20% loss of height.  She ultimately was ambulatory and reasonably pain controlled and discharged.  She returned on 12/2 with pain again and was ultimately improved and discharged.  She has subsequently followed up with her PMNR doctor at [facility] and was discharged with Norco 10/325 as well as muscle relaxants.  Since 12/2 patient has essentially been bedbound, she is barely able to get up to the bathroom when she needs to due to severe pain.  She lives alone and her daughter brings her in concern that she is unsafe at home.  She notes no bowel or bladder incontinence, no urinary retention, no saddle anesthesia.    PAST MEDICAL HISTORY   has a past medical history of Anemia, Anesthesia, Arthritis, Asthma, CAD (coronary artery disease), Cataract, Dental disorder, Depression, Diabetes (HCC), Disorder of thyroid, Heart burn, History of vertebral fracture, HTN (hypertension), Hyperlipidemia, Indigestion, Migraines, Obesity, Pneumonia (2023), PONV (postoperative nausea and vomiting), and Sleep apnea.    SURGICAL HISTORY   has a past surgical history that includes appendectomy (N/A, 1976); abdominal hysterectomy  Stefani from [facility] called with STAT Beta HCG Quant = 56,651.0, STAT Progesterone = 14.00.   Msg to BOOGIE total (N/A, 1978); breast biopsy (1984); colectomy (N/A, 2007); lumbar laminectomy diskectomy (N/A, 1989); arthroplasty (Right, 2020); lumbar exploration (N/A, 2017); insertion, peripheral nerve stimulator, lower extremity (Left, 12/22/2022); and reconstr total shoulder implant (Right, 4/30/2024).    FAMILY HISTORY  Family History   Problem Relation Age of Onset    Cancer Mother         lung    Heart Disease Mother     Hyperlipidemia Mother     Stroke Father     Heart Disease Father     Diabetes Father     Hypertension Father     Hyperlipidemia Father     Heart Disease Brother         cath and bypass    Diabetes Brother     Hypertension Brother     Hyperlipidemia Brother     Diabetes Maternal Aunt     Hypertension Maternal Aunt     Heart Disease Brother         cath and byp[ass    Drug abuse Brother     Heart Disease Brother         cath and bypass    Diabetes Brother     Heart Disease Brother     Other Brother         MVA    Hypertension Maternal Aunt     Drug abuse Daughter     Alcohol abuse Daughter        SOCIAL HISTORY  Social History     Tobacco Use    Smoking status: Never    Smokeless tobacco: Never   Vaping Use    Vaping status: Never Used   Substance and Sexual Activity    Alcohol use: No    Drug use: No    Sexual activity: Yes     Partners: Male     Birth control/protection: Post-Menopausal       CURRENT MEDICATIONS  Home Medications       Reviewed by Karon Mccoy R.N. (Registered Nurse) on 12/06/24 at 1811  Med List Status: Not Addressed     Medication Last Dose Status   albuterol 108 (90 Base) MCG/ACT Aero Soln inhalation aerosol  Active   alendronate (FOSAMAX) 70 MG Tab  Active   amitriptyline (ELAVIL) 100 MG Tab  Active   escitalopram (LEXAPRO) 20 MG tablet  Active   ezetimibe (ZETIA) 10 MG Tab  Active   liothyronine (CYTOMEL) 5 MCG Tab  Active   lisinopril (PRINIVIL) 2.5 MG Tab  Active   loperamide (IMODIUM) 2 MG Cap  Active   metFORMIN (GLUCOPHAGE) 500 MG Tab  Active   metoprolol SR (TOPROL XL)  "25 MG TABLET SR 24 HR  Active   omeprazole (PRILOSEC) 20 MG delayed-release capsule  Active   ondansetron (ZOFRAN ODT) 8 MG TABLET DISPERSIBLE  Active   oxyCODONE-acetaminophen (PERCOCET) 7.5-325 MG per tablet  Active   predniSONE (DELTASONE) 20 MG Tab  Active   rosuvastatin (CRESTOR) 20 MG Tab  Active   sumatriptan (IMITREX) 100 MG tablet  Active   SYNTHROID 88 MCG Tab  Active                  Audit from Redirected Encounters    **Home medications have not yet been reviewed for this encounter**         ALLERGIES  Allergies   Allergen Reactions    Amlodipine Swelling and Unspecified    Bee Swelling    Gabapentin Hives and Swelling    Pregabalin Hives, Swelling and Unspecified    Rifampin Unspecified     Pt turns yellow    Fentanyl Vomiting and Unspecified    Morphine Vomiting, Nausea and Unspecified    Benzoin Rash     Tincture of benzoin =blisters    blisters       PHYSICAL EXAM  VITAL SIGNS: /67   Pulse 96   Temp 36.3 °C (97.4 °F) (Temporal)   Resp 20   Ht 1.575 m (5' 2\")   Wt 67.6 kg (149 lb)   SpO2 96%   BMI 27.25 kg/m²    Pulse oximetry interpretation: I interpret the pulse oximetry as normal.  Constitutional: Awake and alert. Mild acute distress.  Head: NCAT.  HEENT: Normal Conjunctiva.  Neck: Grossly normal range of motion. Airway midline.  Cardiovascular: Normal heart rate, Normal rhythm.  Thorax & Lungs: No respiratory distress. Clear to Auscultation bilaterally.  Abdomen: Normal inspection. Nontender. Nondistended  Skin: No obvious rash.  Back: Upper lumbar midline tenderness, No CVA tenderness.   Musculoskeletal: No obvious deformity. Moves all extremities Well.  Neurologic: A&Ox4.  Straight leg raise 5 out of 5 bilateral lower extremities.  Sensation grossly intact.  Psychiatric: Mood and affect are appropriate for situation.    EKG/LABS  Results for orders placed or performed during the hospital encounter of 12/06/24   POCT glucose device results    Collection Time: 12/06/24  7:50 PM "   Result Value Ref Range    POC Glucose, Blood 99 65 - 99 mg/dL   CBC WITH DIFFERENTIAL    Collection Time: 12/06/24  8:30 PM   Result Value Ref Range    WBC 8.6 4.8 - 10.8 K/uL    RBC 4.14 (L) 4.20 - 5.40 M/uL    Hemoglobin 10.9 (L) 12.0 - 16.0 g/dL    Hematocrit 35.0 (L) 37.0 - 47.0 %    MCV 84.5 81.4 - 97.8 fL    MCH 26.3 (L) 27.0 - 33.0 pg    MCHC 31.1 (L) 32.2 - 35.5 g/dL    RDW 50.0 35.9 - 50.0 fL    Platelet Count 235 164 - 446 K/uL    MPV 8.2 (L) 9.0 - 12.9 fL    Neutrophils-Polys 70.30 44.00 - 72.00 %    Lymphocytes 15.40 (L) 22.00 - 41.00 %    Monocytes 10.50 0.00 - 13.40 %    Eosinophils 3.00 0.00 - 6.90 %    Basophils 0.50 0.00 - 1.80 %    Immature Granulocytes 0.30 0.00 - 0.90 %    Nucleated RBC 0.00 0.00 - 0.20 /100 WBC    Neutrophils (Absolute) 6.07 1.82 - 7.42 K/uL    Lymphs (Absolute) 1.33 1.00 - 4.80 K/uL    Monos (Absolute) 0.91 (H) 0.00 - 0.85 K/uL    Eos (Absolute) 0.26 0.00 - 0.51 K/uL    Baso (Absolute) 0.04 0.00 - 0.12 K/uL    Immature Granulocytes (abs) 0.03 0.00 - 0.11 K/uL    NRBC (Absolute) 0.00 K/uL   COMP METABOLIC PANEL    Collection Time: 12/06/24  8:30 PM   Result Value Ref Range    Sodium 138 135 - 145 mmol/L    Potassium 3.7 3.6 - 5.5 mmol/L    Chloride 104 96 - 112 mmol/L    Co2 24 20 - 33 mmol/L    Anion Gap 10.0 7.0 - 16.0    Glucose 95 65 - 99 mg/dL    Bun 14 8 - 22 mg/dL    Creatinine 0.60 0.50 - 1.40 mg/dL    Calcium 8.4 8.4 - 10.2 mg/dL    Correct Calcium 8.8 8.5 - 10.5 mg/dL    AST(SGOT) 13 12 - 45 U/L    ALT(SGPT) 12 2 - 50 U/L    Alkaline Phosphatase 90 30 - 99 U/L    Total Bilirubin 0.3 0.1 - 1.5 mg/dL    Albumin 3.5 3.2 - 4.9 g/dL    Total Protein 6.3 6.0 - 8.2 g/dL    Globulin 2.8 1.9 - 3.5 g/dL    A-G Ratio 1.3 g/dL   ESTIMATED GFR    Collection Time: 12/06/24  8:30 PM   Result Value Ref Range    GFR (CKD-EPI) 94 >60 mL/min/1.73 m 2       I have independently interpreted this EKG    RADIOLOGY/PROCEDURES   I have independently interpreted the diagnostic imaging  associated with this visit and am waiting the final reading from the radiologist.   My preliminary interpretation is as follows: no hip fracture    Radiologist interpretation:  DX-HIP-UNILATERAL-WITH PELVIS-1 VIEW RIGHT   Final Result         1. No definite acute osseous abnormality but evaluation is limited due to osseous demineralization.      MR-LUMBAR SPINE-W/O    (Results Pending)       COURSE & MEDICAL DECISION MAKING    ASSESSMENT, COURSE AND PLAN  Care Narrative:   75-year-old female history of hypertension hyperlipidemia hypothyroidism and recent L1 and L2 compression fracture with 20% loss of height presents for intractable pain and inability to care for self at home  Afebrile and reassuring vitals  On exam does have upper lumbar midline tenderness at this site corresponding with her known lumbar fractures, has very good range of motion and strength at the right hip and bilateral lower extremity strength intact.  Considerations to intractable pain, new fracture, cauda equina, epidural abscess, hip fracture  As the hip was not evaluated on her index visit we will obtain pelvic x-ray but patient has been ambulatory, has excellent strength on exam so low suspicion for fracture  She is medicated with morphine IV.  Will discussed with the hospitalist for admission given intractable pain, inability to perform ADLs at home and likely need pain control and PT and OT in the hospital.      ADDITIONAL PROBLEMS MANAGED    Intractable pain    DISPOSITION AND DISCUSSIONS  I have discussed management of the patient with the following physicians and ALEC's:  none    Discussion of management with other QHP or appropriate source(s): None     Escalation of care considered, and ultimately not performed:Laboratory analysis, diagnostic imaging, and acute inpatient care management, however at this time, the patient is most appropriate for outpatient management    Barriers to care at this time, including but not limited to:  None  .     Decision tools and prescription drugs considered including, but not limited to: Pain Medications morphine IV for pain .    FINAL DIAGNOSIS  1. Closed compression fracture of L2 lumbar vertebra, sequela    2. Closed compression fracture of L1 lumbar vertebra, sequela    3. Intractable pain         Electronically signed by: Clara Ng D.O., 12/6/2024 6:56 PM

## 2024-12-19 ENCOUNTER — OFFICE VISIT (OUTPATIENT)
Dept: OBGYN CLINIC | Facility: CLINIC | Age: 34
End: 2024-12-19
Payer: MEDICAID

## 2024-12-19 ENCOUNTER — LAB ENCOUNTER (OUTPATIENT)
Dept: LAB | Facility: HOSPITAL | Age: 34
End: 2024-12-19
Attending: ADVANCED PRACTICE MIDWIFE
Payer: MEDICAID

## 2024-12-19 VITALS
WEIGHT: 259 LBS | SYSTOLIC BLOOD PRESSURE: 136 MMHG | BODY MASS INDEX: 43.15 KG/M2 | DIASTOLIC BLOOD PRESSURE: 84 MMHG | HEART RATE: 89 BPM | HEIGHT: 65 IN

## 2024-12-19 DIAGNOSIS — D50.9 IRON DEFICIENCY ANEMIA, UNSPECIFIED IRON DEFICIENCY ANEMIA TYPE: ICD-10-CM

## 2024-12-19 DIAGNOSIS — E88.819 INSULIN RESISTANCE: ICD-10-CM

## 2024-12-19 DIAGNOSIS — E66.01 MORBID OBESITY WITH BMI OF 40.0-44.9, ADULT (HCC): ICD-10-CM

## 2024-12-19 DIAGNOSIS — Z51.81 ENCOUNTER FOR THERAPEUTIC DRUG MONITORING: ICD-10-CM

## 2024-12-19 DIAGNOSIS — Z11.3 ROUTINE SCREENING FOR STI (SEXUALLY TRANSMITTED INFECTION): ICD-10-CM

## 2024-12-19 DIAGNOSIS — E55.9 VITAMIN D DEFICIENCY: ICD-10-CM

## 2024-12-19 DIAGNOSIS — Z01.419 ENCOUNTER FOR ANNUAL ROUTINE GYNECOLOGICAL EXAMINATION: Primary | ICD-10-CM

## 2024-12-19 DIAGNOSIS — Z30.430 ENCOUNTER FOR INSERTION OF INTRAUTERINE CONTRACEPTIVE DEVICE (IUD): ICD-10-CM

## 2024-12-19 LAB
CHOLEST SERPL-MCNC: 116 MG/DL (ref ?–200)
EST. AVERAGE GLUCOSE BLD GHB EST-MCNC: 105 MG/DL (ref 68–126)
FASTING PATIENT LIPID ANSWER: NO
HBA1C MFR BLD: 5.3 % (ref ?–5.7)
HBV SURFACE AG SER-ACNC: <0.1 [IU]/L
HBV SURFACE AG SERPL QL IA: NONREACTIVE
HCV AB SERPL QL IA: NONREACTIVE
HDLC SERPL-MCNC: 34 MG/DL (ref 40–59)
IRON SATN MFR SERPL: 8 %
IRON SERPL-MCNC: 32 UG/DL
LDLC SERPL CALC-MCNC: 67 MG/DL (ref ?–100)
NONHDLC SERPL-MCNC: 82 MG/DL (ref ?–130)
T PALLIDUM AB SER QL IA: NONREACTIVE
TIBC SERPL-MCNC: 417 UG/DL (ref 250–425)
TRANSFERRIN SERPL-MCNC: 280 MG/DL (ref 250–380)
TRIGL SERPL-MCNC: 73 MG/DL (ref 30–149)
VIT B12 SERPL-MCNC: 1426 PG/ML (ref 211–911)
VIT D+METAB SERPL-MCNC: 20.1 NG/ML (ref 30–100)
VLDLC SERPL CALC-MCNC: 11 MG/DL (ref 0–30)

## 2024-12-19 PROCEDURE — 86780 TREPONEMA PALLIDUM: CPT

## 2024-12-19 PROCEDURE — 86803 HEPATITIS C AB TEST: CPT

## 2024-12-19 PROCEDURE — 36415 COLL VENOUS BLD VENIPUNCTURE: CPT

## 2024-12-19 PROCEDURE — 83540 ASSAY OF IRON: CPT

## 2024-12-19 PROCEDURE — 58300 INSERT INTRAUTERINE DEVICE: CPT | Performed by: ADVANCED PRACTICE MIDWIFE

## 2024-12-19 PROCEDURE — 82607 VITAMIN B-12: CPT

## 2024-12-19 PROCEDURE — 87389 HIV-1 AG W/HIV-1&-2 AB AG IA: CPT

## 2024-12-19 PROCEDURE — 87340 HEPATITIS B SURFACE AG IA: CPT

## 2024-12-19 PROCEDURE — 99395 PREV VISIT EST AGE 18-39: CPT | Performed by: ADVANCED PRACTICE MIDWIFE

## 2024-12-19 PROCEDURE — 83036 HEMOGLOBIN GLYCOSYLATED A1C: CPT

## 2024-12-19 PROCEDURE — 84466 ASSAY OF TRANSFERRIN: CPT

## 2024-12-19 PROCEDURE — 80061 LIPID PANEL: CPT

## 2024-12-19 PROCEDURE — 82306 VITAMIN D 25 HYDROXY: CPT

## 2024-12-19 RX ORDER — COPPER 313.4 MG/1
1 INTRAUTERINE DEVICE INTRAUTERINE ONCE
Status: COMPLETED | OUTPATIENT
Start: 2024-12-19 | End: 2024-12-19

## 2024-12-19 RX ADMIN — COPPER 1 DEVICE: 313.4 INTRAUTERINE DEVICE INTRAUTERINE at 18:15:00

## 2024-12-19 NOTE — PROCEDURES
IUD Insertion     Pregnancy Results: negative from urine test   Birth control method(s) used: none      Consent signed.  Procedure discussed with the patient in detail including indication, risks, benefits, alternatives and complications.    Pelvic Exam Findings:  Lesion description: see progress note    Procedure:  Speculum placed in the vagina.  Betadine wash of vagina and cervix.  Single tooth tenaculum was placed at the 12 o'clock position.  Uterus sounded to 8 cm.  Paragard IUD was placed without difficulty.  Strings cut at 3 cm.  Single tooth tenaculum removed.  Pressure used to achieve hemostasis.  Good hemostasis noted.  GC/CHL screen performed.  Patient tolerated procedure well.      Visit Plan:  IUD surveillance was discussed with the patient.

## 2024-12-19 NOTE — PROGRESS NOTES
Subjective:   Patient ID: Ansley Colorado is a 34 year old female who presents for her annual gyne exam.  Menses regular monthly.  Sexually active male partner  Does not consistently use condoms.  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   Denies excessive ETOH use, smoking, vapping or any non prescriptive drug use. Denies any family hx of breast CA.  Attempting weight loss  with Semaglutide/ cyanocobalaminDesires a Paraguard IUD today    HPI    History/Other:   Review of Systems   Constitutional: Negative.    Respiratory: Negative.     Cardiovascular: Negative.    Neurological: Negative.    Psychiatric/Behavioral: Negative.       Current Outpatient Medications   Medication Sig Dispense Refill    CUSTOM MEDICATION Semaglutide/ cyanocobalamin    0.25 mg-   concentration: 1 /0.5 mg/ ML  Amount:  1 Ml vial  Instructions: inject 25 units/ 0.25 ML q weekly (Patient not taking: Reported on 12/19/2024) 1 each 5    ibuprofen 600 MG Oral Tab Take 1 tablet (600 mg total) by mouth every 6 (six) hours as needed for Pain. (Patient not taking: Reported on 12/19/2024) 60 tablet 0    acetaminophen 325 MG Oral Tab Take 1 tablet (325 mg total) by mouth every 6 (six) hours as needed for Pain. (Patient not taking: Reported on 12/19/2024) 60 tablet 0     Allergies:Allergies[1]    Objective:   Physical Exam  Vitals reviewed. Exam conducted with a chaperone present.   Constitutional:       Appearance: Normal appearance. She is obese.   Cardiovascular:      Rate and Rhythm: Normal rate.   Pulmonary:      Effort: Pulmonary effort is normal.   Chest:   Breasts:     Right: Normal.      Left: Normal.   Genitourinary:     General: Normal vulva.      Labia:         Right: No rash, tenderness, lesion or injury.         Left: No rash, tenderness, lesion or injury.       Vagina: Normal.      Cervix: Normal.   Lymphadenopathy:      Upper Body:      Right upper body: No axillary adenopathy.       Left upper body: No axillary adenopathy.   Skin:     General: Skin is warm and dry.   Neurological:      Mental Status: She is alert and oriented to person, place, and time.     Assessment & Plan:   1. Encounter for annual routine gynecological examination    2. Encounter for insertion of intrauterine contraceptive device (IUD)      Counseled patient on PAP screening guidelines UTD 2023 NILM    Discussed breast awareness  Discussed Multivit with Folic acid and Vitamin D supplementation  Discussed weight management and exercise  Vaccines offered today: HPV declined by patient  Discussed contraceptive options and prevention of STIs  Reviewed risks and benefits of IUD  F/u 1 year or prn     Orders Placed This Encounter   Procedures    INSERT INTRAUTERINE DEVICE [[id number]]       Meds This Visit:  Requested Prescriptions      No prescriptions requested or ordered in this encounter       Imaging & Referrals:  None       [1]   Allergies  Allergen Reactions    Nickel RASH    Soap RASH     DOVE SOAP and TIDE LAUNDRY DETERGENT

## 2024-12-20 LAB
C TRACH DNA SPEC QL NAA+PROBE: NEGATIVE
N GONORRHOEA DNA SPEC QL NAA+PROBE: NEGATIVE

## 2024-12-23 DIAGNOSIS — E55.9 VITAMIN D DEFICIENCY: Primary | ICD-10-CM

## 2024-12-23 RX ORDER — ERGOCALCIFEROL 1.25 MG/1
50000 CAPSULE, LIQUID FILLED ORAL WEEKLY
Qty: 12 CAPSULE | Refills: 0 | Status: SHIPPED | OUTPATIENT
Start: 2024-12-23 | End: 2025-03-11

## 2025-01-21 ENCOUNTER — LAB ENCOUNTER (OUTPATIENT)
Dept: LAB | Facility: HOSPITAL | Age: 35
End: 2025-01-21
Attending: ADVANCED PRACTICE MIDWIFE
Payer: MEDICAID

## 2025-01-21 ENCOUNTER — OFFICE VISIT (OUTPATIENT)
Dept: OBGYN CLINIC | Facility: CLINIC | Age: 35
End: 2025-01-21
Payer: MEDICAID

## 2025-01-21 VITALS
HEIGHT: 65 IN | DIASTOLIC BLOOD PRESSURE: 82 MMHG | BODY MASS INDEX: 42.65 KG/M2 | HEART RATE: 94 BPM | SYSTOLIC BLOOD PRESSURE: 131 MMHG | WEIGHT: 256 LBS

## 2025-01-21 DIAGNOSIS — Z97.5 BREAKTHROUGH BLEEDING WITH IUD: Primary | ICD-10-CM

## 2025-01-21 DIAGNOSIS — N92.1 BREAKTHROUGH BLEEDING WITH IUD: Primary | ICD-10-CM

## 2025-01-21 DIAGNOSIS — Z30.431 IUD CHECK UP: ICD-10-CM

## 2025-01-21 DIAGNOSIS — Z13.0 SCREENING FOR DEFICIENCY ANEMIA: ICD-10-CM

## 2025-01-21 DIAGNOSIS — Z97.5 BREAKTHROUGH BLEEDING WITH IUD: ICD-10-CM

## 2025-01-21 DIAGNOSIS — N92.1 BREAKTHROUGH BLEEDING WITH IUD: ICD-10-CM

## 2025-01-21 LAB
DEPRECATED RDW RBC AUTO: 41.9 FL (ref 35.1–46.3)
ERYTHROCYTE [DISTWIDTH] IN BLOOD BY AUTOMATED COUNT: 14.2 % (ref 11–15)
HCT VFR BLD AUTO: 37.7 %
HGB BLD-MCNC: 12.4 G/DL
MCH RBC QN AUTO: 26.6 PG (ref 26–34)
MCHC RBC AUTO-ENTMCNC: 32.9 G/DL (ref 31–37)
MCV RBC AUTO: 80.9 FL
PLATELET # BLD AUTO: 323 10(3)UL (ref 150–450)
RBC # BLD AUTO: 4.66 X10(6)UL
WBC # BLD AUTO: 6.9 X10(3) UL (ref 4–11)

## 2025-01-21 PROCEDURE — 99213 OFFICE O/P EST LOW 20 MIN: CPT | Performed by: ADVANCED PRACTICE MIDWIFE

## 2025-01-21 PROCEDURE — 36415 COLL VENOUS BLD VENIPUNCTURE: CPT

## 2025-01-21 PROCEDURE — 85027 COMPLETE CBC AUTOMATED: CPT

## 2025-01-21 NOTE — PROGRESS NOTES
Subjective:   Patient ID: Ansley Colorado is a 34 year old female who presents for evaluation for heavy bleeding with IUD.  IUD was placed on 12/19  Menses on 1/3 that was heavy and lasted 5 days.  Also reported more cramping than usual.  No bleeding since Denies any vaginal discharge.  Denies any pain.  Sexually active x 1 since placement of IUD   no pain.      HPI    History/Other:   Review of Systems   Constitutional: Negative.    Respiratory: Negative.     Cardiovascular: Negative.    Psychiatric/Behavioral: Negative.       Current Outpatient Medications   Medication Sig Dispense Refill    ergocalciferol 1.25 MG (43526 UT) Oral Cap Take 1 capsule (50,000 Units total) by mouth once a week for 12 doses. 12 capsule 0    CUSTOM MEDICATION Semaglutide/ cyanocobalamin    0.25 mg-   concentration: 1 /0.5 mg/ ML  Amount:  1 Ml vial  Instructions: inject 25 units/ 0.25 ML q weekly (Patient not taking: Reported on 1/21/2025) 1 each 5    ibuprofen 600 MG Oral Tab Take 1 tablet (600 mg total) by mouth every 6 (six) hours as needed for Pain. (Patient not taking: Reported on 1/21/2025) 60 tablet 0    acetaminophen 325 MG Oral Tab Take 1 tablet (325 mg total) by mouth every 6 (six) hours as needed for Pain. (Patient not taking: Reported on 1/21/2025) 60 tablet 0     Allergies:Allergies[1]    Objective:   Physical Exam  Vitals reviewed.   Constitutional:       General: She is not in acute distress.     Appearance: Normal appearance. She is obese. She is not ill-appearing, toxic-appearing or diaphoretic.   Cardiovascular:      Rate and Rhythm: Normal rate.   Pulmonary:      Effort: Pulmonary effort is normal.   Genitourinary:     General: Normal vulva.      Exam position: Lithotomy position.      Vagina: Normal.      Cervix: Normal.      Uterus: Normal. Not tender.       Adnexa: Right adnexa normal and left adnexa normal.        Right: No tenderness.          Left: No tenderness.        Comments: IUD strings  visualized  Skin:     General: Skin is warm and dry.   Neurological:      Mental Status: She is alert and oriented to person, place, and time.       Assessment & Plan:   1. Breakthrough bleeding with IUD    2. Screening for deficiency anemia    3. IUD check up    Discussed possible etiologies  Reviewed bleeding pattern with IUD  Pt to monitor symptoms  CBC today  Pelvic ultrasound for IUD placement ordered  All questions answered    Orders Placed This Encounter   Procedures    CBC, Platelet; No Differential       Meds This Visit:  Requested Prescriptions      No prescriptions requested or ordered in this encounter       Imaging & Referrals:  US PELVIS (TRANSABDOMINAL AND TRANSVAGINAL) (CPT=76856/16200)       [1]   Allergies  Allergen Reactions    Nickel RASH    Soap RASH     DOVE SOAP and TIDE LAUNDRY DETERGENT

## 2025-02-12 ENCOUNTER — HOSPITAL ENCOUNTER (EMERGENCY)
Facility: HOSPITAL | Age: 35
Discharge: HOME | End: 2025-02-12
Attending: EMERGENCY MEDICINE
Payer: MEDICAID

## 2025-02-12 ENCOUNTER — APPOINTMENT (OUTPATIENT)
Dept: CT IMAGING | Facility: HOSPITAL | Age: 35
End: 2025-02-12
Attending: EMERGENCY MEDICINE
Payer: MEDICAID

## 2025-02-12 VITALS
DIASTOLIC BLOOD PRESSURE: 89 MMHG | HEIGHT: 65 IN | TEMPERATURE: 98 F | RESPIRATION RATE: 18 BRPM | OXYGEN SATURATION: 100 % | SYSTOLIC BLOOD PRESSURE: 144 MMHG | BODY MASS INDEX: 42.82 KG/M2 | HEART RATE: 84 BPM | WEIGHT: 257 LBS
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
## 2025-02-12 DIAGNOSIS — S09.90XA INJURY OF HEAD, INITIAL ENCOUNTER: ICD-10-CM

## 2025-02-12 PROCEDURE — 99284 EMERGENCY DEPT VISIT MOD MDM: CPT

## 2025-02-12 PROCEDURE — 70450 CT HEAD/BRAIN W/O DYE: CPT | Performed by: EMERGENCY MEDICINE

## 2025-02-12 RX ORDER — IBUPROFEN 600 MG/1
600 TABLET, FILM COATED ORAL EVERY 8 HOURS PRN
Qty: 20 TABLET | Refills: 0 | Status: SHIPPED | OUTPATIENT
Start: 2025-02-12 | End: 2025-02-19

## 2025-02-12 RX ORDER — ONDANSETRON 4 MG/1
4 TABLET, ORALLY DISINTEGRATING ORAL EVERY 4 HOURS PRN
Qty: 10 TABLET | Refills: 0 | Status: SHIPPED | OUTPATIENT
Start: 2025-02-12 | End: 2025-02-19

## 2025-02-12 RX ORDER — ACETAMINOPHEN 325 MG/1
650 TABLET ORAL EVERY 6 HOURS PRN
Qty: 20 TABLET | Refills: 0 | Status: SHIPPED | OUTPATIENT
Start: 2025-02-12

## 2025-02-12 NOTE — ED INITIAL ASSESSMENT (HPI)
Patient arrives to the ED after falling outside. States when falling she hit the back of her head, +nausea no vomiting, headache, ringing in the right ear and dizziness. Denies thinners.

## 2025-02-13 NOTE — ED QUICK NOTES
Patient provided with discharge instruction. Verbalized understanding. Per plan of care at home and follow up. All questions/concerns addressed prior to discharge.

## 2025-02-13 NOTE — ED PROVIDER NOTES
Patient Seen in: [facility] Emergency Department    History     Chief Complaint   Patient presents with    Fall       HPI    34-year-old female who presents ER today after she slipped and fell and hit the back of her head.  No wounds or lacerations.  No bleeding.  Patient she been feeling her pulse in her ear since she hit her head.  No neck pain.  No chest pain, shortness of breath.  No arm or leg injury.  No loss consciousness    History reviewed.   Past Medical History:    Insulin resistance    Morbid obesity with BMI of 40.0-44.9, adult (HCC)    Sickle cell trait       History reviewed.   Past Surgical History:   Procedure Laterality Date      2024    twin B    [place] teeth removed               Medications :  Prescriptions Prior to Admission[1]     Family History   Problem Relation Age of Onset    Diabetes Father     Diabetes Maternal Grandmother     Heart Disorder Maternal Grandmother     Hypertension Maternal Grandmother     Cancer Maternal Grandfather     Diabetes Maternal Grandfather     Diabetes Paternal Grandmother     Cancer Paternal Grandmother         Ovarian/uterine    Other (congestive heart failure) Paternal Grandmother     Prostate Cancer Paternal Grandfather        Smoking Status:   Social History     Socioeconomic History    Marital status: Single     Spouse name: Angus Farah    Number of children: 3    Years of education: 16   Occupational History    Occupation:      Comment: Full time    Occupation: RN     Comment: [employer]   Tobacco Use    Smoking status: Never    Smokeless tobacco: Never   Vaping Use    Vaping status: Never Used   Substance and Sexual Activity    Alcohol use: Not Currently     Alcohol/week: 1.0 standard drink of alcohol     Types: 1 Standard drinks or equivalent per week    Drug use: No   Other Topics Concern     Service No    Blood Transfusions No    Caffeine Concern No    Occupational Exposure No    Hobby Hazards  No    Sleep Concern No    Stress Concern No    Weight Concern No    Special Diet No    Back Care No    Exercise No    Bike Helmet No    Seat Belt Yes    Self-Exams No       Constitutional and vital signs reviewed.      Social History and Family History elements reviewed from today, pertinent positives to the presenting problem noted.    Physical Exam     ED Triage Vitals   BP 02/12/25 1737 144/87   Pulse 02/12/25 1736 91   Resp 02/12/25 1736 18   Temp 02/12/25 1736 98.3 °F (36.8 °C)   Temp src 02/12/25 1736 Oral   SpO2 02/12/25 1736 100 %   O2 Device 02/12/25 1736 None (Room air)       All measures to prevent infection transmission during my interaction with the patient were taken. Handwashing was performed prior to and after the exam.  Stethoscope and any equipment used during my examination was cleaned with super sani-cloth germicidal wipes following the exam.     Physical Exam  Vitals and nursing note reviewed.   HENT:      Head: Normocephalic.      Right Ear: Tympanic membrane, ear canal and external ear normal.      Left Ear: Tympanic membrane, ear canal and external ear normal.   Eyes:      Pupils: Pupils are equal, round, and reactive to light.   Cardiovascular:      Rate and Rhythm: Normal rate.      Pulses: Normal pulses.   Pulmonary:      Effort: Pulmonary effort is normal.   Abdominal:      Palpations: Abdomen is soft.   Musculoskeletal:         General: Normal range of motion.   Neurological:      General: No focal deficit present.      Mental Status: She is alert.         ED Course      Labs Reviewed - No data to display    As Interpreted by me    Imaging Results Available and Reviewed while in ED: No results found.  ED Medications Administered: Medications - No data to display      MDM     Vitals:    02/12/25 1736 02/12/25 1737   BP:  144/87   Pulse: 91    Resp: 18    Temp: 98.3 °F (36.8 °C)    TempSrc: Oral    SpO2: 100%    Weight: 116.6 kg    Height: 165.1 cm (5' 5\")      *I personally reviewed and  interpreted all ED vitals.    Pulse Ox: 100%, Room air, Normal       Differential Diagnosis/ Diagnostic Considerations: Contusion, fracture, ICH    Complicating Factors: The patient already has does not have any pertinent problems on file. to contribute to the complexity of this ED evaluation.    Medical Decision Making  Amount and/or Complexity of Data Reviewed  Radiology: ordered. Decision-making details documented in ED Course.    Risk  OTC drugs.  Prescription drug management.      I reviewed CT results with patient.  No acute injury.  Will discharge home.  Patient understands take Tylenol, ibuprofen as needed for pain.  Also give her Zofran as needed for any nausea that may occur.  Follow-up with her primary care provider in 1 to 2 days.  Return to the ER if some continue, get worse, unable to follow-up  Condition upon leaving the department: Stable    Disposition and Plan     Clinical Impression:  1. Fall, initial encounter    2. Injury of head, initial encounter        Disposition:  Discharge    Follow-up:  Peace Alexander MD  [address] [place] [id number]  [phone]    Follow up        Medications Prescribed:  Current Discharge Medication List        START taking these medications    Details   !! ibuprofen 600 MG Oral Tab Take 1 tablet (600 mg total) by mouth every 8 (eight) hours as needed for Pain or Fever.  Qty: 20 tablet, Refills: 0      !! acetaminophen 325 MG Oral Tab Take 2 tablets (650 mg total) by mouth every 6 (six) hours as needed for Pain.  Qty: 20 tablet, Refills: 0      ondansetron 4 MG Oral Tablet Dispersible Take 1 tablet (4 mg total) by mouth every 4 (four) hours as needed for Nausea.  Qty: 10 tablet, Refills: 0       !! - Potential duplicate medications found. Please discuss with provider.                           [1] (Not in a hospital admission)

## (undated) DEVICE — 3M™ MEDIPORE™ H SOFT CLOTH SURGICAL TAPE 2864, 4 INCH X 10 YARD (10CM X 9,14M), 12 ROLLS/CASE: Brand: 3M™ MEDIPORE™

## (undated) NOTE — ED AVS SNAPSHOT
Lanie Maxwell   MRN: [id number]    Department:  [facility] Emergency Department   Date of Visit:  1/15/2020           Disclosure     Insurance plans vary and the physician(s) referred by the ER may not be covered by your plan.  Please conta CARE PHYSICIAN AT ONCE OR RETURN IMMEDIATELY TO THE EMERGENCY DEPARTMENT. If you have been prescribed any medication(s), please fill your prescription right away and begin taking the medication(s) as directed.   If you believe that any of the medications

## (undated) NOTE — IP AVS SNAPSHOT
[address]  [address] [facility] [phone]                Discharge Summary   5/24/2017    Angela Amador           Admission Information        Provider Department    5/24/2017 Aj Mayes MD [facility] Aranza Lombardi appointments for your exam are available. Walk-in patients are welcome for most exams. [facility]  Diagnostics Main [facility] [address]) ([address] Parking)  [address]   [address] [facility] - If you have concerns related to behavioral health issues or thoughts of harming yourself, contact [address] [place] [address] at [phone].     - If you don’t have insurance, Camelia Tucker has partnered with Patient [employer] Erika call your provider or healthcare team if you have any questions regarding your medications while at home.          Ant-Infective Medications     TRUVADA 200-300 MG Oral Tab         Use: Treat infections or suspected infection   Most common side effects: All

## (undated) NOTE — LETTER
[facility]  Administration of Anesthesia  I, Ansley Colorado agree to be cared for by a physician anesthesiologist alone and/or with a nurse anesthetist, who is specially trained to monitor me and give me medicine to put me to sleep or keep me comfortable during my procedure    I understand that my anesthesiologist and/or anesthetist is not an employee or agent of [facility] or [employer]. He or she works for [facility]    As the patient asking for anesthesia services, I agree to:  Allow the anesthesiologist (anesthesia doctor) to give me medicine and do additional procedures as necessary. Some examples are: Starting or using an “IV” to give me medicine, fluids or blood during my procedure, and having a breathing tube placed to help me breathe when I’m asleep (intubation). In the event that my heart stops working properly, I understand that my anesthesiologist will make every effort to sustain my life, unless otherwise directed by [facility] Do Not Resuscitate documents.  Tell my anesthesia doctor before my procedure:  If I am pregnant.  The last time that I ate or drank.  iii. All of the medicines I take (including prescriptions, herbal supplements, and pills I can buy without a prescription (including street drugs/illegal medications). Failure to inform my anesthesiologist about these medicines may increase my risk of anesthetic complications.  iv.If I am allergic to anything or have had a reaction to anesthesia before.  I understand how the anesthesia medicine will help me (benefits).  I understand that with any type of anesthesia medicine there are risks:  The most common risks are: nausea, vomiting, sore throat, muscle soreness, damage to my eyes, mouth, or teeth (from breathing tube placement).  Rare risks include: remembering what happened during my procedure, allergic reactions to medications, injury to my airway, heart, lungs, vision, nerves, or  muscles and in extremely rare instances death.  My doctor has explained to me other choices available to me for my care (alternatives).  Pregnant Patients (“epidural”):  I understand that the risks of having an epidural (medicine given into my back to help control pain during labor), include itching, low blood pressure, difficulty urinating, headache or slowing of the baby’s heart. Very rare risks include infection, bleeding, seizure, irregular heart rhythms and nerve injury.  Regional Anesthesia (“spinal”, “epidural”, & “nerve blocks”):  I understand that rare but potential complications include headache, bleeding, infection, seizure, irregular heart rhythms, and nerve injury.    _____________________________________________________________________________  Patient (or Representative) Signature/Relationship to Patient  Date   Time    _____________________________________________________________________________   Name (if used)    Language/Organization   Time    _____________________________________________________________________________  Nurse Anesthetist Signature     Date   Time  _____________________________________________________________________________  Anesthesiologist Signature     Date   Time  I have discussed the procedure and information above with the patient (or patient’s representative) and answered their questions. The patient or their representative has agreed to have anesthesia services.    _____________________________________________________________________________  Witness        Date   Time  I have verified that the signature is that of the patient or patient’s representative, and that it was signed before the procedure  Patient Name: Ansley Colorado     : 3/13/1990                 Printed: 2024 at 3:00 PM    Medical Record #: [id number]                                            Page 1 of 1  ----------ANESTHESIA CONSENT----------

## (undated) NOTE — LETTER
24    RE: Ansley Rodriguezquez    : 3/13/1990    To Whom It May Concern:    Our patient, Ansley Colorado,      _____Has received treatment in our office on ___________________________.        _____Has been hospitalized on the following dates ______________________.       _____ Has been ill and unable to work from _____________________________.        __x___ May resume work / school on _6/3/2024 __________________________________.      _____ May resume work / school with the following restrictions ______________    __________________________________________________________.      _____ May participate in physical education.      _____ May participate in a prenatal exercise class / yoga class.      _____ Patient is pregnant with a due date of Estimated Date of Delivery: 24      _____ Other _____________________________________________________     __________________________________________________________       Alisha Dyer MD

## (undated) NOTE — ED AVS SNAPSHOT
Beena Talamantes   MRN: [id number]    Department:  [facility] Emergency Department   Date of Visit:  10/23/2019           Disclosure     Insurance plans vary and the physician(s) referred by the ER may not be covered by your plan.  Please cont CARE PHYSICIAN AT ONCE OR RETURN IMMEDIATELY TO THE EMERGENCY DEPARTMENT. If you have been prescribed any medication(s), please fill your prescription right away and begin taking the medication(s) as directed.   If you believe that any of the medications

## (undated) NOTE — LETTER
4/9/2018          To Whom It May Concern:    Joan Nichole is currently under my medical care. Please excuse the patient from work missed as she has been ill with strep throat. May return to work Tuesday, 4/10/18.       If you require additional inform

## (undated) NOTE — LETTER
Date & Time: 6/21/2023, 12:51 AM  Patient: Jeremy Foley  Encounter Provider(s):    Darcy Hwang MD       To Whom It May Concern:    Piyush Hwang was seen and treated in our department on 6/20/2023. She should not return to work until Thursday 6/22 due to strep throat infection .     If you have any questions or concerns, please do not hesitate to call.        _____________________________  Physician/APC Signature

## (undated) NOTE — LETTER
[facility]  Administration of Anesthesia  1. Jose Fall, or _________________________________ acting on her behalf, (Patient) (Dependent/Representative) request to receive anesthesia for my pending procedure/operation/treatment. 6. OBSTETRIC PATIENTS: Specific risks/consequences of spinal/epidural anesthesia may include itching, low blood pressure, difficulty urinating, slowing of the baby's heart rate and headache.  Rare risks include infections, high spinal block, spinal bleeding ___________________________________________________           _____________________________________________________  Date/Time                                                                                               Responsible person in case of minor

## (undated) NOTE — MR AVS SNAPSHOT
Judie  [address]  [phone]               Thank you for choosing us for your health care visit with Nurse.   We are glad to serve you and happy to provide you with this summary of your vis TSH Assay, Thyroid Stim Hormone    Complete by:  Apr 24, 2017 (Approximate)    Assoc Dx:  Encounter for supervision of other normal pregnancy in third trimester [Z34.83]                 Reason for Today's Visit     Prenatal Care           Medical Issues D

## (undated) NOTE — IP AVS SNAPSHOT
[address]  [address] [facility] [phone]                Discharge Summary   4/26/2017    Helen Nissen           Admission Information        Provider Department    4/26/2017 Ashley Hickey MD [facility] To schedule a test at any [facility], call Central Scheduling at [phone], Monday through Friday between 7:30am to 6pm and on Saturday between 8am and 1pm. Evening and weekend appointments for your exam are available.   Aisha Anderson None         Additional Information       We are concerned for your overall well being:    - If you are a smoker or have smoked in the last 12 months, we encourage you to explore options for quitting.     - If you have concerns related to behavioral healt review your medications with you before you are discharged, and can provide you with additional printed information. Not all patients will experience these side effects or respond to medications the same.  Please call your provider or healthcare team if you

## (undated) NOTE — LETTER
AUTHORIZATION FOR SURGICAL OPERATION OR OTHER PROCEDURE    1. I hereby authorize Barry Padilla CNM  and [place] [facility] staff assigned to my case to perform the following operation and/or procedure at the [place] [facility]:    Nexplanon Removal     2. My physician has explained the nature and purpose of the operation or other procedure, possible alternative methods of treatment, the risks involved, and the possibility of complication to me. I acknowledge that no guarantee has been made as to the result that may be obtained. 3.  I recognize that, during the course of this operation, or other procedure, unforseen conditions may necessitate additional or different procedure than those listed above. I, therefore, further authorize and request that the above named physician, his/her physician assistants or designees perform such procedures as are, in his/her professional opinion, necessary and desirable. 4.  Any tissue or organs removed in the operation or other procedure may be disposed of by and at the discretion of the [facility] and [facility]. 5.  I understand that in the event of a medical emergency, I will be transported by local paramedics to [facility] or other hospital emergency department. 6.  I certify that I have read and fully understand the above consent to operation and/or other procedure. 7.  I acknowledge that my physician has explained sedation/analgesia administration to me including the risks and benefits. I consent to the administration of sedation/analgesia as may be necessary or desirable in the judgement of my physician. Witness signature: ___________________________________________________ Date:  ______/______/_____                    Time:  ________ A. M.  P.M.        Patient Name:  ______________________________________________________  (please print)      Patient signature: ___________________________________________________             Relationship to Patient:           []  Parent    Responsible person                          []  Spouse  In case of minor or                    [] Other  _____________   Incompetent name:  __________________________________________________                               (please print)      _____________      Responsible person  In case of minor or  Incompetent signature:  _______________________________________________    Statement of Physician  My signature below affirms that prior to the time of the procedure, I have explained to the patient and/or his/her guardian, the risks and benefits involved in the proposed treatment and any reasonable alternative to the proposed treatment. I have also explained the risks and benefits involved in the refusal of the proposed treatment and have answered the patient's questions.                         Date:  ______/______/_______  Provider                      Signature:  __________________________________________________________       Time:  ___________ A.M    P.M.

## (undated) NOTE — LETTER
[address]  [address] [place], [place]      Authorization for Surgical Operation and Procedure     Date:___12/22/2020________                                                                                                         Ti 4.   Should the need arise during my operation or immediate post-operative period, I also consent to the administration of blood and/or blood products.   Further, I understand that despite careful testing and screening of blood or blood products by cortney 8.   I recognize that in the event my procedure results in extended X-Ray/fluoroscopy time, I may develop a skin reaction. 9.  If I have a Do Not Attempt Resuscitation (DNAR) order in place, that status will be suspended while in the operating room, proc STATEMENT OF PHYSICIAN My signature below affirms that prior to the time of the procedure; I have explained to the patient and/or his/her legal representative, the risks and benefits involved in the proposed treatment and any reasonable alternative to the

## (undated) NOTE — LETTER
Date & Time: 10/23/2019, 4:09 PM  Patient: Geroge Older  Encounter Provider(s):    MIC Valverde       To Whom It May Concern:    Jass Madison was seen and treated in our department on 10/23/2019. She may return to work 10-.     If

## (undated) NOTE — MR AVS SNAPSHOT
[facility] [place] [address] [address] [id number] [phone]               Thank you for choosing us for your health care visit with Macey Sanches MD.  We are glad to serve you and happy to provide you with this summary of yo your appointment immediately. However, if you are unsure about the requirements for authorization, please wait 5-7 days and then contact your physician's   office.  At that time, you will be provided with any authorization numbers or be assured that none ar Healthy Diet and Regular Exercise  [employer] [facility] [employer] for making healthy food choices  -   Enjoy your food, but eat less. Fully enjoy your food when eating. Don’t eat while distracted and slow down. Avoid over sized portions.    Maggy Nichols

## (undated) NOTE — LETTER
October 8, 2021    [address] [facility] [address]      Dear Lisa Kinney: The following are the results of your recent tests. Please review the list of test results.   Your result is the value on the left; we have also supplied the

## (undated) NOTE — Clinical Note
Continue universal precautions during the pregnancy to help prevent transmission.  Continue Truvada Growth US in 4 wks Notify nursery of risk of exposure

## (undated) NOTE — Clinical Note
VACCINE ADMINISTRATION RECORD  PARENT / GUARDIAN APPROVAL  Date: 2017  Vaccine administered to: Mckay Wood     : 3/13/1990    MRN: [id number]    A copy of the appropriate Centers for Disease Control and Prevention Vaccine Information statemen

## (undated) NOTE — LETTER
Date & Time: 1/15/2020, 8:24 PM  Patient: Chanel Jaffe  Encounter Provider(s):    Bryan Rojo Attending  Marty Moreno MD       To Whom It May Concern:    Rosy Urias was seen and treated in our department on 1/15/2020.  She should not retur

## (undated) NOTE — ED AVS SNAPSHOT
Woodrow Marvin   MRN: [id number]    Department:  [facility] Emergency Department   Date of Visit:  4/5/2018           Disclosure     Insurance plans vary and the physician(s) referred by the ER may not be covered by your plan.  Please contact CARE PHYSICIAN AT ONCE OR RETURN IMMEDIATELY TO THE EMERGENCY DEPARTMENT. If you have been prescribed any medication(s), please fill your prescription right away and begin taking the medication(s) as directed.   If you believe that any of the medications

## (undated) NOTE — IP AVS SNAPSHOT
Patient Demographics     Address Phone    [facility] [place] [address] [phone] (Home) *Preferred*      Emergency Contact(s)     Name Relation Home Work Mobile    Ck Coburn   [phone]      Allergies as of 4/26/2017  Reviewed on: 4/

## (undated) NOTE — LETTER
AUTHORIZATION FOR SURGICAL OPERATION OR OTHER PROCEDURE    1. I hereby authorize [facility] Jane Mora and [facility] staff assigned to my case to perform the following operation and/or procedure at the [facility] site:    ___________________Nadird IUD insertion ____________________________________________________      2.  My physician has explained the nature and purpose of the operation or other procedure, possible alternative methods of treatment, the risks involved, and the possibility of complication to me.  I acknowledge that no guarantee has been made as to the result that may be obtained.  3.  I recognize that, during the course of this operation, or other procedure, unforseen conditions may necessitate additional or different procedure than those listed above.  I, therefore, further authorize and request that the above named physician, his/her physician assistants or designees perform such procedures as are, in his/her professional opinion, necessary and desirable.  4.  Any tissue or organs removed in the operation or other procedure may be disposed of by and at the discretion of the [facility] and [facility].  5.  I understand that in the event of a medical emergency, I will be transported by local paramedics to [facility] or other hospital emergency department.  6.  I certify that I have read and fully understand the above consent to operation and/or other procedure.    7.  I acknowledge that my physician has explained sedation/analgesia administration to me including the risks and benefits.  I consent to the administration of sedation/analgesia as may be necessary or desirable in the judgement of my physician.    Witness signature: ___________________________________________________ Date:  ______/______/_____                    Time:  ________ A.M.  P.M.       Patient Name:  ______________________________________________________  (please  print)      Patient signature:  ___________________________________________________             Relationship to Patient:           []  Parent    Responsible person                          []  Spouse  In case of minor or                    [] Other  _____________   Incompetent name:  __________________________________________________                               (please print)      _____________      Responsible person  In case of minor or  Incompetent signature:  _______________________________________________    Statement of Physician  My signature below affirms that prior to the time of the procedure, I have explained to the patient and/or his/her guardian, the risks and benefits involved in the proposed treatment and any reasonable alternative to the proposed treatment.  I have also explained the risks and benefits involved in the refusal of the proposed treatment and have answered the patient's questions.                        Date:  ______/______/_______  Provider                      Signature:  __________________________________________________________       Time:  ___________ A.M    P.M.

## (undated) NOTE — LETTER
VACCINE ADMINISTRATION RECORD  PARENT / GUARDIAN APPROVAL  Date: 2020  Vaccine administered to: Gaby Farrar     : 3/13/1990    MRN: [id number]    A copy of the appropriate Centers for Disease Control and Prevention Vaccine Information statem

## (undated) NOTE — MR AVS SNAPSHOT
[address]  [phone]               Thank you for choosing us for your health care visit with Amandeep Dunbar CNM.   We are glad to serve you and happy to provide you with this sum physician's office. At that time, you will be provided with any authorization numbers or be assured that none are required. You can then schedule your appointment.  Failure to obtain required authorization numbers can create reimbursement difficulties for y Support Staff. Remember, MyChart is NOT to be used for urgent needs. For medical emergencies, dial 911.            Visit [facility] online at  F?[email or web address]

## (undated) NOTE — LETTER
5/19/2020          To Whom It May Concern:    Dana Jaeger is patient of mine for years. It is advisable in the current situation that she works from home until next evaluation in 3 months . If you have any questions please contact me.            Si

## (undated) NOTE — IP AVS SNAPSHOT
Patient Demographics     Address Phone    [facility] [place] [address] [phone] (Home) *Preferred*      Emergency Contact(s)     Name Relation Home Work Mobile    Ck wiggins Spouse   [phone]      Allergies as of 5/24/2017  Reviewed on: 5/

## (undated) NOTE — LETTER
09/27/21    You will need to return to clinic in 48-72 hours to have results of TB test read. Please return to clinic on 9/29/21 after 4:40 p.m or on 9/30/21 before 4:40 p.m to have your TB test read.     If you do not return during this time your test

## (undated) NOTE — ED AVS SNAPSHOT
Yukihaydee Alexandre   MRN: [id number]    Department:  [facility] Emergency Department   Date of Visit:  5/1/2019           Disclosure     Insurance plans vary and the physician(s) referred by the ER may not be covered by your plan.  Please contact CARE PHYSICIAN AT ONCE OR RETURN IMMEDIATELY TO THE EMERGENCY DEPARTMENT. If you have been prescribed any medication(s), please fill your prescription right away and begin taking the medication(s) as directed.   If you believe that any of the medications